# Patient Record
Sex: FEMALE | Race: WHITE | Employment: OTHER | URBAN - METROPOLITAN AREA
[De-identification: names, ages, dates, MRNs, and addresses within clinical notes are randomized per-mention and may not be internally consistent; named-entity substitution may affect disease eponyms.]

---

## 2020-08-31 RX ORDER — METOPROLOL SUCCINATE 25 MG/1
25 TABLET, EXTENDED RELEASE ORAL DAILY
Status: ON HOLD | COMMUNITY
End: 2022-01-01 | Stop reason: HOSPADM

## 2020-08-31 RX ORDER — GABAPENTIN 100 MG/1
100 CAPSULE ORAL 3 TIMES DAILY
Status: ON HOLD | COMMUNITY
End: 2022-01-01

## 2020-09-02 ENCOUNTER — HOSPITAL ENCOUNTER (OUTPATIENT)
Age: 85
Discharge: HOME | End: 2020-09-04
Payer: MEDICARE

## 2020-09-02 PROCEDURE — U0003 INFECTIOUS AGENT DETECTION BY NUCLEIC ACID (DNA OR RNA); SEVERE ACUTE RESPIRATORY SYNDROME CORONAVIRUS 2 (SARS-COV-2) (CORONAVIRUS DISEASE [COVID-19]), AMPLIFIED PROBE TECHNIQUE, MAKING USE OF HIGH THROUGHPUT TECHNOLOGIES AS DESCRIBED BY CMS-2020-01-R: HCPCS

## 2020-09-09 ENCOUNTER — HOSPITAL ENCOUNTER (OUTPATIENT)
Age: 85
Setting detail: OUTPATIENT SURGERY
Discharge: HOME | End: 2020-09-09
Attending: ANESTHESIOLOGY | Admitting: ANESTHESIOLOGY
Payer: MEDICARE

## 2020-09-09 ENCOUNTER — HOSPITAL ENCOUNTER (OUTPATIENT)
Dept: OPERATING ROOM | Age: 85
Setting detail: OUTPATIENT SURGERY
Discharge: HOME | End: 2020-09-09
Attending: ANESTHESIOLOGY
Payer: MEDICARE

## 2020-09-09 VITALS
BODY MASS INDEX: 18.78 KG/M2 | HEIGHT: 63 IN | OXYGEN SATURATION: 98 % | TEMPERATURE: 98.9 F | DIASTOLIC BLOOD PRESSURE: 66 MMHG | RESPIRATION RATE: 16 BRPM | SYSTOLIC BLOOD PRESSURE: 111 MMHG | HEART RATE: 76 BPM | WEIGHT: 106 LBS

## 2020-09-09 LAB
SARS-COV-2: NOT DETECTED
SOURCE: NORMAL

## 2020-09-09 PROCEDURE — 3600000005 HC SURGERY LEVEL 5 BASE: Performed by: ANESTHESIOLOGY

## 2020-09-09 PROCEDURE — 7100000010 HC PHASE II RECOVERY - FIRST 15 MIN: Performed by: ANESTHESIOLOGY

## 2020-09-09 PROCEDURE — 7100000011 HC PHASE II RECOVERY - ADDTL 15 MIN: Performed by: ANESTHESIOLOGY

## 2020-09-09 PROCEDURE — 2709999900 HC NON-CHARGEABLE SUPPLY: Performed by: ANESTHESIOLOGY

## 2020-09-09 PROCEDURE — 2500000003 HC RX 250 WO HCPCS: Performed by: ANESTHESIOLOGY

## 2020-09-09 PROCEDURE — 6360000002 HC RX W HCPCS: Performed by: ANESTHESIOLOGY

## 2020-09-09 PROCEDURE — 3209999900 FLUORO FOR SURGICAL PROCEDURES

## 2020-09-09 RX ORDER — LIDOCAINE HYDROCHLORIDE 10 MG/ML
INJECTION, SOLUTION EPIDURAL; INFILTRATION; INTRACAUDAL; PERINEURAL PRN
Status: DISCONTINUED | OUTPATIENT
Start: 2020-09-09 | End: 2020-09-09 | Stop reason: ALTCHOICE

## 2020-09-09 ASSESSMENT — PAIN SCALES - GENERAL
PAINLEVEL_OUTOF10: 0
PAINLEVEL_OUTOF10: 0

## 2020-09-09 ASSESSMENT — PAIN - FUNCTIONAL ASSESSMENT: PAIN_FUNCTIONAL_ASSESSMENT: 0-10

## 2020-09-09 ASSESSMENT — PAIN DESCRIPTION - DESCRIPTORS: DESCRIPTORS: DISCOMFORT

## 2020-09-09 NOTE — OP NOTE
Sean Deleon    9/9/2020      Preoperative Diagnoses: Degenerative Osteoarthrosis with Facet Syndrome , Lumbar Spondylosis     Postoperative Diagnoses: Same     Procedure Performed: #1 Therapeutic and diagnostic Bilateral Lumbar facet block with medial branch nerve root block under direct fluoroscopic guidance      Anesthesia: Local.     Blood Loss:    None. Brief History:  Loyd Walker comes today for the first  lumbar facet block with medial branch nerve root block. She was explained the details and potential complications of the procedure and requested we proceed. Her complete History & Physical examination was reviewed and it is unchanged. Description of Procedure:     Loyd Walker was taken to the procedure room at The [address] [place] [address]  where, with the assistance of a nurse, she was placed on the fluoroscopy table in the prone position with a roll under the appropriate hip. The lumbar vertebral anatomy was then examined under fluoroscopy. An indelible marker was used to zaheer the facet joints at the appropriate levels. Next the entire lumbar region was prepped and draped in the usual sterile manner. A time-out was performed and confirmed the patients name, date of birth, the procedure to be performed and skin marked for appropriate site and side of procedure. All questions and concerns were answered and the patient requested we proceed. A total of 10 ml. of 1% Xylocaine plain was utilized to obtain local anesthesia of the skin and underlying subcutaneous tissue via a #25-gauge 1.5-inch needle. The right L3-4, L4-5, L5-S1 levels on the appropriate side a #22-gauge 5.0-inch spinal needle was inserted through the skin. It was then positioned under fluoroscopic guidance until it came to lie within the appropriate facet joint.  A solution was then mixed consisting of 8 ml. of 0.25% Bupivacaine and 40 mg Depo-Medrol and 1.5 ml. of this solution was injected into the area of the facet joint and surrounding area. The needle was then repositioned to perform the medial branch nerve block. An additional 1.5 ml. of solution was injected at that level. The needle was then withdrawn intact. The entire procedure was then repeated at the left L3-4, L4-5, L5-S1 levels. Sterile Band-Aids were applied. Loyd Walker was then placed in the supine position and transported to the recovery room and noted to have tolerated the procedure in satisfactory condition. Loyd Walker has been given discharge instructions with their next scheduled appointment.       Electronically signed by Binnie Bosworth

## 2020-09-09 NOTE — H&P
Update History & Physical    The patient's History and Physical of 08/14/2020 was reviewed with the patient and there were no significant changes. I examined the patient and there were no significant changes from the previous History and Physical.    Plan: The risk, benefits, expected outcome, and alternative to the recommended procedure have been discussed with the patient. Patient understands and wants to proceed with the procedure.       Electronically signed by Rhonda Tena DO on 9/9/20 at 11:03 AM EDT

## 2020-09-10 RX ORDER — LANOLIN ALCOHOL/MO/W.PET/CERES
3 CREAM (GRAM) TOPICAL NIGHTLY
COMMUNITY
Start: 2019-03-26 | End: 2022-01-01 | Stop reason: ALTCHOICE

## 2020-09-11 ENCOUNTER — HOSPITAL ENCOUNTER (OUTPATIENT)
Age: 85
Discharge: HOME | End: 2020-09-13
Payer: MEDICARE

## 2020-09-11 PROCEDURE — U0003 INFECTIOUS AGENT DETECTION BY NUCLEIC ACID (DNA OR RNA); SEVERE ACUTE RESPIRATORY SYNDROME CORONAVIRUS 2 (SARS-COV-2) (CORONAVIRUS DISEASE [COVID-19]), AMPLIFIED PROBE TECHNIQUE, MAKING USE OF HIGH THROUGHPUT TECHNOLOGIES AS DESCRIBED BY CMS-2020-01-R: HCPCS

## 2020-09-16 ENCOUNTER — HOSPITAL ENCOUNTER (OUTPATIENT)
Age: 85
Setting detail: OUTPATIENT SURGERY
Discharge: HOME | End: 2020-09-16
Attending: ANESTHESIOLOGY | Admitting: ANESTHESIOLOGY
Payer: MEDICARE

## 2020-09-16 ENCOUNTER — HOSPITAL ENCOUNTER (OUTPATIENT)
Dept: OPERATING ROOM | Age: 85
Setting detail: OUTPATIENT SURGERY
Discharge: HOME | End: 2020-09-16
Attending: ANESTHESIOLOGY
Payer: MEDICARE

## 2020-09-16 VITALS
HEIGHT: 63 IN | SYSTOLIC BLOOD PRESSURE: 160 MMHG | HEART RATE: 72 BPM | OXYGEN SATURATION: 100 % | RESPIRATION RATE: 18 BRPM | WEIGHT: 107 LBS | DIASTOLIC BLOOD PRESSURE: 85 MMHG | BODY MASS INDEX: 18.96 KG/M2 | TEMPERATURE: 98 F

## 2020-09-16 LAB
SARS-COV-2: NOT DETECTED
SOURCE: NORMAL

## 2020-09-16 PROCEDURE — 6360000002 HC RX W HCPCS: Performed by: ANESTHESIOLOGY

## 2020-09-16 PROCEDURE — 7100000010 HC PHASE II RECOVERY - FIRST 15 MIN: Performed by: ANESTHESIOLOGY

## 2020-09-16 PROCEDURE — 7100000011 HC PHASE II RECOVERY - ADDTL 15 MIN: Performed by: ANESTHESIOLOGY

## 2020-09-16 PROCEDURE — 2500000003 HC RX 250 WO HCPCS: Performed by: ANESTHESIOLOGY

## 2020-09-16 PROCEDURE — 3600000005 HC SURGERY LEVEL 5 BASE: Performed by: ANESTHESIOLOGY

## 2020-09-16 PROCEDURE — 3209999900 FLUORO FOR SURGICAL PROCEDURES

## 2020-09-16 PROCEDURE — 2709999900 HC NON-CHARGEABLE SUPPLY: Performed by: ANESTHESIOLOGY

## 2020-09-16 RX ORDER — LIDOCAINE HYDROCHLORIDE 10 MG/ML
INJECTION, SOLUTION EPIDURAL; INFILTRATION; INTRACAUDAL; PERINEURAL PRN
Status: DISCONTINUED | OUTPATIENT
Start: 2020-09-16 | End: 2020-09-16 | Stop reason: ALTCHOICE

## 2020-09-16 ASSESSMENT — PAIN SCALES - GENERAL
PAINLEVEL_OUTOF10: 0
PAINLEVEL_OUTOF10: 0

## 2020-09-16 ASSESSMENT — PAIN - FUNCTIONAL ASSESSMENT: PAIN_FUNCTIONAL_ASSESSMENT: 0-10

## 2020-09-16 ASSESSMENT — PAIN DESCRIPTION - DESCRIPTORS: DESCRIPTORS: DISCOMFORT

## 2020-09-16 NOTE — H&P
Update History & Physical    The patient's History and Physical of 09/04/2020 was reviewed with the patient and there were no significant changes. I examined the patient and there were no significant changes from the previous History and Physical.    Plan: The risk, benefits, expected outcome, and alternative to the recommended procedure have been discussed with the patient. Patient understands and wants to proceed with the procedure.       Electronically signed by Jihan Larsen DO on 9/16/20 at 1:22 PM EDT

## 2020-09-16 NOTE — OP NOTE
Samantha Poster    9/16/2020      Preoperative Diagnoses: Degenerative Osteoarthrosis with Facet Syndrome , Lumbar Spondylosis     Postoperative Diagnoses: Same     Procedure Performed: #2 Therapeutic and diagnostic Bilateral Lumbar facet block with medial branch nerve root block under direct fluoroscopic guidance      Anesthesia: Local.     Blood Loss:    None. Brief History:  Glory Lynn comes today for the second  lumbar facet block with medial branch nerve root block. She was explained the details and potential complications of the procedure and requested we proceed. Glory Lynn states received 85% pain relief following last procedure. Her complete History & Physical examination was reviewed and it is unchanged. Description of Procedure:     Glory Lynn was taken to the procedure room at The [address] [place] [address]  where, with the assistance of a nurse, she was placed on the fluoroscopy table in the prone position with a roll under the appropriate hip. The lumbar vertebral anatomy was then examined under fluoroscopy. An indelible marker was used to zaheer the facet joints at the appropriate levels. Next the entire lumbar region was prepped and draped in the usual sterile manner. A time-out was performed and confirmed the patients name, date of birth, the procedure to be performed and skin marked for appropriate site and side of procedure. All questions and concerns were answered and the patient requested we proceed. A total of 10 ml. of 1% Xylocaine plain was utilized to obtain local anesthesia of the skin and underlying subcutaneous tissue via a #25-gauge 1.5-inch needle. The right L3-4, L4-5, L5-S1 levels on the appropriate side a #22-gauge 5.0-inch spinal needle was inserted through the skin. It was then positioned under fluoroscopic guidance until it came to lie within the appropriate facet joint.  A solution was then mixed consisting of 8 ml. of 0.25% Bupivacaine and 40 mg Depo-Medrol and 1.5 ml. of this solution was injected into the area of the facet joint and surrounding area. The needle was then repositioned to perform the medial branch nerve block. An additional 1.5 ml. of solution was injected at that level. The needle was then withdrawn intact. The entire procedure was then repeated at the left L3-4, L4-5, L5-S1 levels. Sterile Band-Aids were applied. Bridgette Huang was then placed in the supine position and transported to the recovery room and noted to have tolerated the procedure in satisfactory condition. Bridgette Huang has been given discharge instructions with their next scheduled appointment.       Electronically signed by Jacki Altman

## 2020-11-03 ENCOUNTER — HOSPITAL ENCOUNTER (EMERGENCY)
Age: 85
Discharge: HOME | End: 2020-11-03
Attending: EMERGENCY MEDICINE
Payer: MEDICARE

## 2020-11-03 ENCOUNTER — APPOINTMENT (OUTPATIENT)
Dept: ULTRASOUND IMAGING | Age: 85
End: 2020-11-03
Payer: MEDICARE

## 2020-11-03 ENCOUNTER — APPOINTMENT (OUTPATIENT)
Dept: CT IMAGING | Age: 85
End: 2020-11-03
Payer: MEDICARE

## 2020-11-03 VITALS
HEART RATE: 85 BPM | RESPIRATION RATE: 16 BRPM | DIASTOLIC BLOOD PRESSURE: 88 MMHG | WEIGHT: 107 LBS | BODY MASS INDEX: 18.95 KG/M2 | OXYGEN SATURATION: 95 % | SYSTOLIC BLOOD PRESSURE: 130 MMHG | TEMPERATURE: 97.8 F

## 2020-11-03 LAB
ANION GAP SERPL CALCULATED.3IONS-SCNC: 8 MMOL/L (ref 7–16)
BACTERIA: ABNORMAL /HPF
BASOPHILS ABSOLUTE: 0.03 E9/L (ref 0–0.2)
BASOPHILS RELATIVE PERCENT: 0.4 % (ref 0–2)
BILIRUBIN URINE: NEGATIVE
BLOOD, URINE: ABNORMAL
BUN BLDV-MCNC: 25 MG/DL (ref 8–23)
CALCIUM SERPL-MCNC: 9.5 MG/DL (ref 8.6–10.2)
CHLORIDE BLD-SCNC: 103 MMOL/L (ref 98–107)
CLARITY: CLEAR
CO2: 29 MMOL/L (ref 22–29)
COLOR: YELLOW
CREAT SERPL-MCNC: 0.6 MG/DL (ref 0.5–1)
EOSINOPHILS ABSOLUTE: 0.1 E9/L (ref 0.05–0.5)
EOSINOPHILS RELATIVE PERCENT: 1.3 % (ref 0–6)
EPITHELIAL CELLS, UA: ABNORMAL /HPF
GFR AFRICAN AMERICAN: >60
GFR NON-AFRICAN AMERICAN: >60 ML/MIN/1.73
GLUCOSE BLD-MCNC: 120 MG/DL (ref 74–99)
GLUCOSE URINE: NEGATIVE MG/DL
HCT VFR BLD CALC: 38.3 % (ref 34–48)
HEMOGLOBIN: 12.3 G/DL (ref 11.5–15.5)
IMMATURE GRANULOCYTES #: 0.02 E9/L
IMMATURE GRANULOCYTES %: 0.3 % (ref 0–5)
KETONES, URINE: NEGATIVE MG/DL
LEUKOCYTE ESTERASE, URINE: ABNORMAL
LYMPHOCYTES ABSOLUTE: 1.17 E9/L (ref 1.5–4)
LYMPHOCYTES RELATIVE PERCENT: 15.2 % (ref 20–42)
MCH RBC QN AUTO: 29.1 PG (ref 26–35)
MCHC RBC AUTO-ENTMCNC: 32.1 % (ref 32–34.5)
MCV RBC AUTO: 90.5 FL (ref 80–99.9)
MONOCYTES ABSOLUTE: 0.98 E9/L (ref 0.1–0.95)
MONOCYTES RELATIVE PERCENT: 12.7 % (ref 2–12)
NEUTROPHILS ABSOLUTE: 5.41 E9/L (ref 1.8–7.3)
NEUTROPHILS RELATIVE PERCENT: 70.1 % (ref 43–80)
NITRITE, URINE: NEGATIVE
PDW BLD-RTO: 14.6 FL (ref 11.5–15)
PH UA: 6.5 (ref 5–9)
PLATELET # BLD: 196 E9/L (ref 130–450)
PMV BLD AUTO: 10.1 FL (ref 7–12)
POTASSIUM SERPL-SCNC: 3.8 MMOL/L (ref 3.5–5)
PROTEIN UA: NEGATIVE MG/DL
RBC # BLD: 4.23 E12/L (ref 3.5–5.5)
RBC UA: ABNORMAL /HPF (ref 0–2)
SODIUM BLD-SCNC: 140 MMOL/L (ref 132–146)
SPECIFIC GRAVITY UA: 1.02 (ref 1–1.03)
UROBILINOGEN, URINE: 0.2 E.U./DL
WBC # BLD: 7.7 E9/L (ref 4.5–11.5)
WBC UA: ABNORMAL /HPF (ref 0–5)

## 2020-11-03 PROCEDURE — 70450 CT HEAD/BRAIN W/O DYE: CPT

## 2020-11-03 PROCEDURE — 93970 EXTREMITY STUDY: CPT

## 2020-11-03 PROCEDURE — 81001 URINALYSIS AUTO W/SCOPE: CPT

## 2020-11-03 PROCEDURE — 6370000000 HC RX 637 (ALT 250 FOR IP): Performed by: EMERGENCY MEDICINE

## 2020-11-03 PROCEDURE — 80048 BASIC METABOLIC PNL TOTAL CA: CPT

## 2020-11-03 PROCEDURE — 99283 EMERGENCY DEPT VISIT LOW MDM: CPT

## 2020-11-03 PROCEDURE — 85025 COMPLETE CBC W/AUTO DIFF WBC: CPT

## 2020-11-03 PROCEDURE — 93005 ELECTROCARDIOGRAM TRACING: CPT | Performed by: EMERGENCY MEDICINE

## 2020-11-03 RX ORDER — CEPHALEXIN 250 MG/1
500 CAPSULE ORAL ONCE
Status: COMPLETED | OUTPATIENT
Start: 2020-11-03 | End: 2020-11-03

## 2020-11-03 RX ORDER — ONDANSETRON 4 MG/1
4 TABLET, ORALLY DISINTEGRATING ORAL EVERY 8 HOURS PRN
Qty: 8 TABLET | Refills: 0 | Status: ON HOLD | OUTPATIENT
Start: 2020-11-03 | End: 2022-01-01

## 2020-11-03 RX ORDER — CEPHALEXIN 500 MG/1
500 CAPSULE ORAL 4 TIMES DAILY
Qty: 40 CAPSULE | Refills: 0 | Status: ON HOLD | OUTPATIENT
Start: 2020-11-03 | End: 2022-01-01

## 2020-11-03 RX ADMIN — CEPHALEXIN 500 MG: 250 CAPSULE ORAL at 16:57

## 2020-11-03 ASSESSMENT — ENCOUNTER SYMPTOMS
VOMITING: 0
SHORTNESS OF BREATH: 0
ABDOMINAL PAIN: 0
SORE THROAT: 0
NAUSEA: 0
COUGH: 0

## 2020-11-03 ASSESSMENT — PAIN DESCRIPTION - ORIENTATION: ORIENTATION: LEFT

## 2020-11-03 ASSESSMENT — PAIN DESCRIPTION - LOCATION: LOCATION: GROIN

## 2020-11-03 ASSESSMENT — PAIN SCALES - GENERAL: PAINLEVEL_OUTOF10: 9

## 2020-11-03 NOTE — ED PROVIDER NOTES
Chief complaint leg swelling and numbness  History of present illness 80-year-old female sent in by her physician today for evaluation of her leg and numbness. She has had swelling and slight discoloration of the left lower leg as well as some discomfort in her left thigh for the past several days since she tripped and fell after twisting her right leg. She also has some swelling in the right lower leg which she states is probably secondary to the fall to but she has no pain on that side. She has been able to walk and bear weight, denies any numbness or tingling anywhere other than the left leg and the numbness there is between the proximal aspect of the tib-fib region and to the ankle. No numbness or weakness in the foot or toes. She denies any headache or head injury, denies any neck pain, denies visual changes, denies chest pain difficulty breathing abdominal pain or any other acute complaints at this time. Review of Systems   Constitutional: Negative for chills and fever. HENT: Negative for congestion and sore throat. Respiratory: Negative for cough and shortness of breath. Cardiovascular: Negative for chest pain. Gastrointestinal: Negative for abdominal pain, nausea and vomiting. Musculoskeletal: Negative for neck pain. Neurological: Positive for numbness. Negative for dizziness, weakness and light-headedness. All other systems reviewed and are negative. Physical Exam    General: awake and alert, oriented, in no distress. Skin: warm, pink, and dry. Head: normocephalic and atraumatic  EENT: TMs are clear and gray, external auditory canals are normal. Pupils are equal, round, and reactive to light . Conjunctiva are clear. There is no epistaxis or rhinorrhea. Oral mucous membranes are moist, pharynx is clear with no erythema or exudates. Airway is intact with no stridor, no drooling, no difficulty controlling secretions.  Phonation is normal.  Neck: supple and nontender with good range of motion. There are no meningeal signs, there is no cervical adenopathy  Heart: regular rate and rhythm, no murmur. Lungs: clear to auscultation bilaterally, no wheezes or crackles. Breathing unlabored. Abdomen: soft and nontender, nondistended with no rebound, rigidity, or guarding  Extremities: She has mild tenderness over the proximal thigh without swelling there. There is mild edema of the left and right pretibial areas with pitting. The left pretibial region is also slightly erythematous and shiny. No particular tenderness on palpation, no crepitus. She notes numbness down this region of the lower leg but has no numbness to touch, has full sensation distal to that, including the ankle foot and toes. She has good cap refill in the toes, strong DP pulses bilaterally. She has mild discomfort when straight leg raising on the left no pain with straight leg raise in the right, no pain with rotation of the left leg and there is no shortening of this leg. Neuro: Cranial nerves II through XII are intact, she has no peripheral motor episodes in the 4 extremities. She does have a sensory deficit in the left lower leg as noted above, no other sensory deficits noted on exam.      ----------------------------------------------- PAST HISTORY --------------------------------------------  Past Medical History:  has a past medical history of Arthritis, Osteoporosis, and Paget's disease. Past Surgical History:  has a past surgical history that includes Colon surgery; knee surgery; Lung biopsy; Total hip arthroplasty; joint replacement; Nerve Block (11-); Nerve Block (11 16 2011); Nerve Block (11 30 2011); Nerve Block (12 28 2012); Nerve Block; Nerve Block (1 21 13); Nerve Block (Right, 06 12 2013); Nerve Block (Right, 6 24 13); Nerve Block (Right, 07 24 2013); other surgical history (Right, 09 20 2013); Nerve Block (Right, 1 15 14); Nerve Block (Right, 2/19/2014); Nerve Block (03/05/14);  Nerve Block (Bilateral, 10/29/2014); Nerve Block (Bilateral, 11/5/2014); Nerve Block (Bilateral, 11/12/14); Nerve Block (Right, 05/22/2017); Nerve Block (Right, 06/05/2017); Nerve Block (Bilateral, 06/12/2017); Nerve Block (Bilateral, 06/21/2017); Nerve Block (Bilateral, 09/09/2020); Nerve Block (Bilateral, 9/9/2020); Nerve Block (Bilateral, 09/16/2020); and Nerve Block (Bilateral, 9/16/2020). Social History:  reports that she has never smoked. She does not have any smokeless tobacco history on file. She reports that she does not drink alcohol. Family History: family history is not on file. The patients home medications have been reviewed.     Allergies: Biaxin [clarithromycin] and Floxin [ofloxacin]    ------------------------------------------------ RESULTS ---------------------------------------------------    Labs:  Results for orders placed or performed during the hospital encounter of 11/03/20   CBC auto differential   Result Value Ref Range    WBC 7.7 4.5 - 11.5 E9/L    RBC 4.23 3.50 - 5.50 E12/L    Hemoglobin 12.3 11.5 - 15.5 g/dL    Hematocrit 38.3 34.0 - 48.0 %    MCV 90.5 80.0 - 99.9 fL    MCH 29.1 26.0 - 35.0 pg    MCHC 32.1 32.0 - 34.5 %    RDW 14.6 11.5 - 15.0 fL    Platelets 927 690 - 654 E9/L    MPV 10.1 7.0 - 12.0 fL    Neutrophils % 70.1 43.0 - 80.0 %    Immature Granulocytes % 0.3 0.0 - 5.0 %    Lymphocytes % 15.2 (L) 20.0 - 42.0 %    Monocytes % 12.7 (H) 2.0 - 12.0 %    Eosinophils % 1.3 0.0 - 6.0 %    Basophils % 0.4 0.0 - 2.0 %    Neutrophils Absolute 5.41 1.80 - 7.30 E9/L    Immature Granulocytes # 0.02 E9/L    Lymphocytes Absolute 1.17 (L) 1.50 - 4.00 E9/L    Monocytes Absolute 0.98 (H) 0.10 - 0.95 E9/L    Eosinophils Absolute 0.10 0.05 - 0.50 E9/L    Basophils Absolute 0.03 0.00 - 0.20 N7/Q   Basic metabolic panel   Result Value Ref Range    Sodium 140 132 - 146 mmol/L    Potassium 3.8 3.5 - 5.0 mmol/L    Chloride 103 98 - 107 mmol/L    CO2 29 22 - 29 mmol/L    Anion Gap 8 7 - 16 mmol/L Glucose 120 (H) 74 - 99 mg/dL    BUN 25 (H) 8 - 23 mg/dL    CREATININE 0.6 0.5 - 1.0 mg/dL    GFR Non-African American >60 >=60 mL/min/1.73    GFR African American >60     Calcium 9.5 8.6 - 10.2 mg/dL   Urinalysis   Result Value Ref Range    Color, UA Yellow Straw/Yellow    Clarity, UA Clear Clear    Glucose, Ur Negative Negative mg/dL    Bilirubin Urine Negative Negative    Ketones, Urine Negative Negative mg/dL    Specific Gravity, UA 1.025 1.005 - 1.030    Blood, Urine TRACE (A) Negative    pH, UA 6.5 5.0 - 9.0    Protein, UA Negative Negative mg/dL    Urobilinogen, Urine 0.2 <2.0 E.U./dL    Nitrite, Urine Negative Negative    Leukocyte Esterase, Urine TRACE (A) Negative   EKG 12 Lead   Result Value Ref Range    Ventricular Rate 81 BPM    Atrial Rate 81 BPM    P-R Interval 132 ms    QRS Duration 82 ms    Q-T Interval 376 ms    QTc Calculation (Bazett) 436 ms    P Axis 60 degrees    R Axis 40 degrees    T Axis 55 degrees     Imaging: All Radiology results interpreted by Radiologist unless otherwise noted. US DUP LOWER EXTREMITIES BILATERAL VENOUS   Final Result   No evidence of DVT in either lower extremity. CT HEAD WO CONTRAST   Final Result   No acute intracranial abnormality. Chronic small vessel ischemic disease             EKG:  This EKG is signed and interpreted by ED Physician. Time:  1402   Rate: 81  Rhythm: Sinus. Interpretation: no acute changes. Comparison: no previous EKG.    ---------------------------- NURSING NOTES AND VITALS REVIEWED -------------------------   The nursing notes within the ED encounter and vital signs as below have been reviewed.    /88   Pulse 85   Temp 97.8 °F (36.6 °C)   Resp 16   Wt 107 lb (48.5 kg)   SpO2 95%   BMI 18.95 kg/m²   Oxygen Saturation Interpretation: Normal      ------------------------------------------PROGRESS NOTES -------------------------------------------    ED COURSE MEDICATIONS:                Medications   cephALEXin (KEFLEX)

## 2020-11-04 LAB
EKG ATRIAL RATE: 81 BPM
EKG P AXIS: 60 DEGREES
EKG P-R INTERVAL: 132 MS
EKG Q-T INTERVAL: 376 MS
EKG QRS DURATION: 82 MS
EKG QTC CALCULATION (BAZETT): 436 MS
EKG R AXIS: 40 DEGREES
EKG T AXIS: 55 DEGREES
EKG VENTRICULAR RATE: 81 BPM

## 2022-01-01 ENCOUNTER — APPOINTMENT (OUTPATIENT)
Dept: GENERAL RADIOLOGY | Age: 87
DRG: 956 | End: 2022-01-01
Payer: MEDICARE

## 2022-01-01 ENCOUNTER — APPOINTMENT (OUTPATIENT)
Dept: CT IMAGING | Age: 87
DRG: 956 | End: 2022-01-01
Payer: MEDICARE

## 2022-01-01 ENCOUNTER — ANESTHESIA EVENT (OUTPATIENT)
Dept: ENDOSCOPY | Age: 87
DRG: 956 | End: 2022-01-01
Payer: MEDICARE

## 2022-01-01 ENCOUNTER — ANESTHESIA (OUTPATIENT)
Dept: OPERATING ROOM | Age: 87
DRG: 956 | End: 2022-01-01
Payer: MEDICARE

## 2022-01-01 ENCOUNTER — ANESTHESIA (OUTPATIENT)
Dept: ENDOSCOPY | Age: 87
DRG: 956 | End: 2022-01-01
Payer: MEDICARE

## 2022-01-01 ENCOUNTER — HOSPITAL ENCOUNTER (INPATIENT)
Age: 87
LOS: 17 days | DRG: 956 | End: 2022-07-30
Attending: EMERGENCY MEDICINE | Admitting: INTERNAL MEDICINE
Payer: MEDICARE

## 2022-01-01 ENCOUNTER — ANESTHESIA EVENT (OUTPATIENT)
Dept: OPERATING ROOM | Age: 87
DRG: 956 | End: 2022-01-01
Payer: MEDICARE

## 2022-01-01 ENCOUNTER — APPOINTMENT (OUTPATIENT)
Dept: GENERAL RADIOLOGY | Age: 87
End: 2022-01-01
Payer: MEDICARE

## 2022-01-01 ENCOUNTER — HOSPITAL ENCOUNTER (EMERGENCY)
Age: 87
Discharge: HOME | End: 2022-01-28
Payer: MEDICARE

## 2022-01-01 VITALS
SYSTOLIC BLOOD PRESSURE: 156 MMHG | RESPIRATION RATE: 20 BRPM | HEIGHT: 63 IN | TEMPERATURE: 97.3 F | DIASTOLIC BLOOD PRESSURE: 82 MMHG | OXYGEN SATURATION: 99 % | WEIGHT: 93 LBS | BODY MASS INDEX: 16.48 KG/M2 | HEART RATE: 87 BPM

## 2022-01-01 VITALS
BODY MASS INDEX: 21.16 KG/M2 | WEIGHT: 115 LBS | RESPIRATION RATE: 49 BRPM | HEIGHT: 62 IN | HEART RATE: 122 BPM | SYSTOLIC BLOOD PRESSURE: 98 MMHG | TEMPERATURE: 97.3 F | OXYGEN SATURATION: 56 % | DIASTOLIC BLOOD PRESSURE: 45 MMHG
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
## 2022-01-01 DIAGNOSIS — T79.6XXA TRAUMATIC RHABDOMYOLYSIS, INITIAL ENCOUNTER (HCC): ICD-10-CM

## 2022-01-01 DIAGNOSIS — S72.002A CLOSED FRACTURE OF LEFT HIP, INITIAL ENCOUNTER (HCC): ICD-10-CM

## 2022-01-01 DIAGNOSIS — S72.001A CLOSED FRACTURE OF RIGHT HIP, INITIAL ENCOUNTER (HCC): Primary | ICD-10-CM

## 2022-01-01 DIAGNOSIS — M79.641 RIGHT HAND PAIN: Primary | ICD-10-CM

## 2022-01-01 DIAGNOSIS — J96.00 ACUTE RESPIRATORY FAILURE, UNSPECIFIED WHETHER WITH HYPOXIA OR HYPERCAPNIA (HCC): ICD-10-CM

## 2022-01-01 DIAGNOSIS — M25.531 RIGHT WRIST PAIN: ICD-10-CM

## 2022-01-01 LAB
AADO2: 247 MMHG
AADO2: 421.8 MMHG
AADO2: 525.1 MMHG
AADO2: 585.2 MMHG
AADO2: 613.3 MMHG
ABO/RH: NORMAL
ACANTHOCYTES: ABNORMAL
ACANTHOCYTES: ABNORMAL
ALBUMIN SERPL-MCNC: 2.2 G/DL (ref 3.5–5.2)
ALBUMIN SERPL-MCNC: 2.3 G/DL (ref 3.5–5.2)
ALBUMIN SERPL-MCNC: 2.6 G/DL (ref 3.5–5.2)
ALBUMIN SERPL-MCNC: 2.6 G/DL (ref 3.5–5.2)
ALBUMIN SERPL-MCNC: 2.7 G/DL (ref 3.5–5.2)
ALBUMIN SERPL-MCNC: 2.8 G/DL (ref 3.5–5.2)
ALBUMIN SERPL-MCNC: 2.8 G/DL (ref 3.5–5.2)
ALBUMIN SERPL-MCNC: 2.9 G/DL (ref 3.5–5.2)
ALBUMIN SERPL-MCNC: 2.9 G/DL (ref 3.5–5.2)
ALBUMIN SERPL-MCNC: 3 G/DL (ref 3.5–5.2)
ALBUMIN SERPL-MCNC: 3 G/DL (ref 3.5–5.2)
ALBUMIN SERPL-MCNC: 3.4 G/DL (ref 3.5–5.2)
ALBUMIN SERPL-MCNC: 3.7 G/DL (ref 3.5–5.2)
ALP BLD-CCNC: 108 U/L (ref 35–104)
ALP BLD-CCNC: 121 U/L (ref 35–104)
ALP BLD-CCNC: 137 U/L (ref 35–104)
ALP BLD-CCNC: 57 U/L (ref 35–104)
ALP BLD-CCNC: 59 U/L (ref 35–104)
ALP BLD-CCNC: 60 U/L (ref 35–104)
ALP BLD-CCNC: 60 U/L (ref 35–104)
ALP BLD-CCNC: 61 U/L (ref 35–104)
ALP BLD-CCNC: 64 U/L (ref 35–104)
ALP BLD-CCNC: 80 U/L (ref 35–104)
ALP BLD-CCNC: 81 U/L (ref 35–104)
ALP BLD-CCNC: 90 U/L (ref 35–104)
ALP BLD-CCNC: 95 U/L (ref 35–104)
ALT SERPL-CCNC: 12 U/L (ref 0–32)
ALT SERPL-CCNC: 13 U/L (ref 0–32)
ALT SERPL-CCNC: 14 U/L (ref 0–32)
ALT SERPL-CCNC: 15 U/L (ref 0–32)
ALT SERPL-CCNC: 15 U/L (ref 0–32)
ALT SERPL-CCNC: 16 U/L (ref 0–32)
ALT SERPL-CCNC: 16 U/L (ref 0–32)
ALT SERPL-CCNC: 17 U/L (ref 0–32)
ALT SERPL-CCNC: 17 U/L (ref 0–32)
ALT SERPL-CCNC: 18 U/L (ref 0–32)
ALT SERPL-CCNC: 20 U/L (ref 0–32)
ALT SERPL-CCNC: 31 U/L (ref 0–32)
ALT SERPL-CCNC: 32 U/L (ref 0–32)
ALT SERPL-CCNC: 49 U/L (ref 0–32)
ANION GAP SERPL CALCULATED.3IONS-SCNC: 10 MMOL/L (ref 7–16)
ANION GAP SERPL CALCULATED.3IONS-SCNC: 11 MMOL/L (ref 7–16)
ANION GAP SERPL CALCULATED.3IONS-SCNC: 5 MMOL/L (ref 7–16)
ANION GAP SERPL CALCULATED.3IONS-SCNC: 6 MMOL/L (ref 7–16)
ANION GAP SERPL CALCULATED.3IONS-SCNC: 7 MMOL/L (ref 7–16)
ANION GAP SERPL CALCULATED.3IONS-SCNC: 8 MMOL/L (ref 7–16)
ANION GAP SERPL CALCULATED.3IONS-SCNC: 9 MMOL/L (ref 7–16)
ANISOCYTOSIS: ABNORMAL
ANTIBODY SCREEN: NORMAL
APTT: 24.1 SEC (ref 24.5–35.1)
AST SERPL-CCNC: 20 U/L (ref 0–31)
AST SERPL-CCNC: 24 U/L (ref 0–31)
AST SERPL-CCNC: 24 U/L (ref 0–31)
AST SERPL-CCNC: 26 U/L (ref 0–31)
AST SERPL-CCNC: 26 U/L (ref 0–31)
AST SERPL-CCNC: 28 U/L (ref 0–31)
AST SERPL-CCNC: 28 U/L (ref 0–31)
AST SERPL-CCNC: 29 U/L (ref 0–31)
AST SERPL-CCNC: 30 U/L (ref 0–31)
AST SERPL-CCNC: 32 U/L (ref 0–31)
AST SERPL-CCNC: 37 U/L (ref 0–31)
AST SERPL-CCNC: 38 U/L (ref 0–31)
AST SERPL-CCNC: 41 U/L (ref 0–31)
AST SERPL-CCNC: 45 U/L (ref 0–31)
AST SERPL-CCNC: 52 U/L (ref 0–31)
AST SERPL-CCNC: 53 U/L (ref 0–31)
B.E.: 0 MMOL/L (ref -3–3)
B.E.: 1.7 MMOL/L (ref -3–3)
B.E.: 2.6 MMOL/L (ref -3–3)
B.E.: 3.3 MMOL/L (ref -3–3)
B.E.: 5.2 MMOL/L (ref -3–3)
B.E.: 7.2 MMOL/L (ref -3–3)
BACTERIA: ABNORMAL /HPF
BASOPHILIC STIPPLING: ABNORMAL
BASOPHILS ABSOLUTE: 0 E9/L (ref 0–0.2)
BASOPHILS ABSOLUTE: 0.01 E9/L (ref 0–0.2)
BASOPHILS ABSOLUTE: 0.01 E9/L (ref 0–0.2)
BASOPHILS ABSOLUTE: 0.03 E9/L (ref 0–0.2)
BASOPHILS RELATIVE PERCENT: 0 % (ref 0–2)
BASOPHILS RELATIVE PERCENT: 0 % (ref 0–2)
BASOPHILS RELATIVE PERCENT: 0.1 % (ref 0–2)
BASOPHILS RELATIVE PERCENT: 0.2 % (ref 0–2)
BILIRUB SERPL-MCNC: 0.5 MG/DL (ref 0–1.2)
BILIRUB SERPL-MCNC: 0.5 MG/DL (ref 0–1.2)
BILIRUB SERPL-MCNC: 0.6 MG/DL (ref 0–1.2)
BILIRUB SERPL-MCNC: 0.7 MG/DL (ref 0–1.2)
BILIRUB SERPL-MCNC: 0.8 MG/DL (ref 0–1.2)
BILIRUB SERPL-MCNC: 1.1 MG/DL (ref 0–1.2)
BILIRUB SERPL-MCNC: 1.1 MG/DL (ref 0–1.2)
BILIRUBIN URINE: ABNORMAL
BILIRUBIN URINE: NEGATIVE
BLOOD CULTURE, ROUTINE: NORMAL
BLOOD CULTURE, ROUTINE: NORMAL
BLOOD, URINE: ABNORMAL
BLOOD, URINE: NEGATIVE
BUN BLDV-MCNC: 21 MG/DL (ref 6–23)
BUN BLDV-MCNC: 30 MG/DL (ref 6–23)
BUN BLDV-MCNC: 30 MG/DL (ref 6–23)
BUN BLDV-MCNC: 35 MG/DL (ref 6–23)
BUN BLDV-MCNC: 37 MG/DL (ref 6–23)
BUN BLDV-MCNC: 47 MG/DL (ref 6–23)
BUN BLDV-MCNC: 47 MG/DL (ref 6–23)
BUN BLDV-MCNC: 48 MG/DL (ref 6–23)
BUN BLDV-MCNC: 49 MG/DL (ref 6–23)
BUN BLDV-MCNC: 53 MG/DL (ref 6–23)
BUN BLDV-MCNC: 54 MG/DL (ref 6–23)
BUN BLDV-MCNC: 57 MG/DL (ref 6–23)
BUN BLDV-MCNC: 64 MG/DL (ref 6–23)
BUN BLDV-MCNC: 68 MG/DL (ref 6–23)
BUN BLDV-MCNC: 70 MG/DL (ref 6–23)
BUN BLDV-MCNC: 72 MG/DL (ref 6–23)
BUN BLDV-MCNC: 74 MG/DL (ref 6–23)
BUN BLDV-MCNC: 74 MG/DL (ref 6–23)
BUN BLDV-MCNC: 77 MG/DL (ref 6–23)
BURR CELLS: ABNORMAL
C-REACTIVE PROTEIN: 12.6 MG/DL (ref 0–0.4)
C-REACTIVE PROTEIN: 16.1 MG/DL (ref 0–0.4)
CALCIUM SERPL-MCNC: 8.1 MG/DL (ref 8.6–10.2)
CALCIUM SERPL-MCNC: 8.3 MG/DL (ref 8.6–10.2)
CALCIUM SERPL-MCNC: 8.4 MG/DL (ref 8.6–10.2)
CALCIUM SERPL-MCNC: 8.5 MG/DL (ref 8.6–10.2)
CALCIUM SERPL-MCNC: 8.6 MG/DL (ref 8.6–10.2)
CALCIUM SERPL-MCNC: 8.8 MG/DL (ref 8.6–10.2)
CALCIUM SERPL-MCNC: 8.9 MG/DL (ref 8.6–10.2)
CALCIUM SERPL-MCNC: 9 MG/DL (ref 8.6–10.2)
CALCIUM SERPL-MCNC: 9.1 MG/DL (ref 8.6–10.2)
CALCIUM SERPL-MCNC: 9.1 MG/DL (ref 8.6–10.2)
CALCIUM SERPL-MCNC: 9.2 MG/DL (ref 8.6–10.2)
CALCIUM SERPL-MCNC: 9.7 MG/DL (ref 8.6–10.2)
CALCIUM SERPL-MCNC: 9.7 MG/DL (ref 8.6–10.2)
CHLORIDE BLD-SCNC: 101 MMOL/L (ref 98–107)
CHLORIDE BLD-SCNC: 102 MMOL/L (ref 98–107)
CHLORIDE BLD-SCNC: 103 MMOL/L (ref 98–107)
CHLORIDE BLD-SCNC: 103 MMOL/L (ref 98–107)
CHLORIDE BLD-SCNC: 104 MMOL/L (ref 98–107)
CHLORIDE BLD-SCNC: 106 MMOL/L (ref 98–107)
CHLORIDE BLD-SCNC: 107 MMOL/L (ref 98–107)
CHLORIDE BLD-SCNC: 111 MMOL/L (ref 98–107)
CHLORIDE BLD-SCNC: 112 MMOL/L (ref 98–107)
CHLORIDE BLD-SCNC: 113 MMOL/L (ref 98–107)
CHLORIDE BLD-SCNC: 116 MMOL/L (ref 98–107)
CHLORIDE BLD-SCNC: 98 MMOL/L (ref 98–107)
CHLORIDE BLD-SCNC: 99 MMOL/L (ref 98–107)
CLARITY: ABNORMAL
CLARITY: CLEAR
CO2: 26 MMOL/L (ref 22–29)
CO2: 26 MMOL/L (ref 22–29)
CO2: 27 MMOL/L (ref 22–29)
CO2: 28 MMOL/L (ref 22–29)
CO2: 29 MMOL/L (ref 22–29)
CO2: 29 MMOL/L (ref 22–29)
CO2: 30 MMOL/L (ref 22–29)
CO2: 31 MMOL/L (ref 22–29)
CO2: 31 MMOL/L (ref 22–29)
CO2: 34 MMOL/L (ref 22–29)
COHB: 0.2 % (ref 0–1.5)
COHB: 0.2 % (ref 0–1.5)
COHB: 0.3 % (ref 0–1.5)
COHB: 0.4 % (ref 0–1.5)
COLOR: YELLOW
COLOR: YELLOW
COMMENT: ABNORMAL
CREAT SERPL-MCNC: 0.5 MG/DL (ref 0.5–1)
CREAT SERPL-MCNC: 0.5 MG/DL (ref 0.5–1)
CREAT SERPL-MCNC: 0.6 MG/DL (ref 0.5–1)
CREAT SERPL-MCNC: 0.6 MG/DL (ref 0.5–1)
CREAT SERPL-MCNC: 0.7 MG/DL (ref 0.5–1)
CREAT SERPL-MCNC: 0.9 MG/DL (ref 0.5–1)
CREAT SERPL-MCNC: 1 MG/DL (ref 0.5–1)
CREAT SERPL-MCNC: 1.1 MG/DL (ref 0.5–1)
CRITICAL: ABNORMAL
CULTURE, BLOOD 2: NORMAL
CULTURE, BLOOD 2: NORMAL
CULTURE, RESPIRATORY: ABNORMAL
CULTURE, RESPIRATORY: ABNORMAL
DATE ANALYZED: ABNORMAL
DATE OF COLLECTION: ABNORMAL
EKG ATRIAL RATE: 103 BPM
EKG ATRIAL RATE: 104 BPM
EKG ATRIAL RATE: 109 BPM
EKG ATRIAL RATE: 131 BPM
EKG ATRIAL RATE: 227 BPM
EKG P AXIS: 55 DEGREES
EKG P AXIS: 75 DEGREES
EKG P AXIS: 86 DEGREES
EKG P-R INTERVAL: 114 MS
EKG P-R INTERVAL: 122 MS
EKG P-R INTERVAL: 130 MS
EKG Q-T INTERVAL: 298 MS
EKG Q-T INTERVAL: 300 MS
EKG Q-T INTERVAL: 304 MS
EKG Q-T INTERVAL: 330 MS
EKG Q-T INTERVAL: 352 MS
EKG QRS DURATION: 100 MS
EKG QRS DURATION: 60 MS
EKG QRS DURATION: 60 MS
EKG QRS DURATION: 66 MS
EKG QRS DURATION: 76 MS
EKG QTC CALCULATION (BAZETT): 390 MS
EKG QTC CALCULATION (BAZETT): 394 MS
EKG QTC CALCULATION (BAZETT): 409 MS
EKG QTC CALCULATION (BAZETT): 438 MS
EKG QTC CALCULATION (BAZETT): 447 MS
EKG R AXIS: 10 DEGREES
EKG R AXIS: 50 DEGREES
EKG R AXIS: 56 DEGREES
EKG R AXIS: 56 DEGREES
EKG R AXIS: 75 DEGREES
EKG T AXIS: -168 DEGREES
EKG T AXIS: 71 DEGREES
EKG T AXIS: 83 DEGREES
EKG T AXIS: 85 DEGREES
EKG T AXIS: 92 DEGREES
EKG VENTRICULAR RATE: 103 BPM
EKG VENTRICULAR RATE: 104 BPM
EKG VENTRICULAR RATE: 106 BPM
EKG VENTRICULAR RATE: 109 BPM
EKG VENTRICULAR RATE: 97 BPM
EOSINOPHILS ABSOLUTE: 0 E9/L (ref 0.05–0.5)
EOSINOPHILS ABSOLUTE: 0.07 E9/L (ref 0.05–0.5)
EOSINOPHILS RELATIVE PERCENT: 0 % (ref 0–6)
EOSINOPHILS RELATIVE PERCENT: 0.2 % (ref 0–6)
EOSINOPHILS RELATIVE PERCENT: 0.3 % (ref 0–6)
EOSINOPHILS RELATIVE PERCENT: 0.7 % (ref 0–6)
EOSINOPHILS RELATIVE PERCENT: 2 % (ref 0–6)
EPITHELIAL CELLS, UA: ABNORMAL /HPF
EPITHELIAL CELLS, UA: ABNORMAL /HPF
FERRITIN: 411 NG/ML
FIO2: 100 %
FIO2: 50 %
FIO2: 80 %
FOLATE: 10.1 NG/ML (ref 4.8–24.2)
GFR AFRICAN AMERICAN: 56
GFR AFRICAN AMERICAN: >60
GFR NON-AFRICAN AMERICAN: 46 ML/MIN/1.73
GFR NON-AFRICAN AMERICAN: 52 ML/MIN/1.73
GFR NON-AFRICAN AMERICAN: 58 ML/MIN/1.73
GFR NON-AFRICAN AMERICAN: >60 ML/MIN/1.73
GLUCOSE BLD-MCNC: 104 MG/DL (ref 74–99)
GLUCOSE BLD-MCNC: 107 MG/DL (ref 74–99)
GLUCOSE BLD-MCNC: 117 MG/DL (ref 74–99)
GLUCOSE BLD-MCNC: 127 MG/DL (ref 74–99)
GLUCOSE BLD-MCNC: 128 MG/DL (ref 74–99)
GLUCOSE BLD-MCNC: 131 MG/DL (ref 74–99)
GLUCOSE BLD-MCNC: 132 MG/DL (ref 74–99)
GLUCOSE BLD-MCNC: 135 MG/DL (ref 74–99)
GLUCOSE BLD-MCNC: 138 MG/DL (ref 74–99)
GLUCOSE BLD-MCNC: 138 MG/DL (ref 74–99)
GLUCOSE BLD-MCNC: 141 MG/DL (ref 74–99)
GLUCOSE BLD-MCNC: 145 MG/DL (ref 74–99)
GLUCOSE BLD-MCNC: 145 MG/DL (ref 74–99)
GLUCOSE BLD-MCNC: 153 MG/DL (ref 74–99)
GLUCOSE BLD-MCNC: 154 MG/DL (ref 74–99)
GLUCOSE BLD-MCNC: 177 MG/DL (ref 74–99)
GLUCOSE BLD-MCNC: 185 MG/DL (ref 74–99)
GLUCOSE BLD-MCNC: 231 MG/DL (ref 74–99)
GLUCOSE BLD-MCNC: 232 MG/DL (ref 74–99)
GLUCOSE URINE: NEGATIVE MG/DL
GLUCOSE URINE: NEGATIVE MG/DL
GRAM STAIN ORDERABLE: NORMAL
HCO3: 23.9 MMOL/L (ref 22–26)
HCO3: 25.7 MMOL/L (ref 22–26)
HCO3: 27.3 MMOL/L (ref 22–26)
HCO3: 27.8 MMOL/L (ref 22–26)
HCO3: 30.1 MMOL/L (ref 22–26)
HCO3: 34.2 MMOL/L (ref 22–26)
HCT VFR BLD CALC: 23.4 % (ref 34–48)
HCT VFR BLD CALC: 24.1 % (ref 34–48)
HCT VFR BLD CALC: 26.3 % (ref 34–48)
HCT VFR BLD CALC: 26.5 % (ref 34–48)
HCT VFR BLD CALC: 26.6 % (ref 34–48)
HCT VFR BLD CALC: 26.8 % (ref 34–48)
HCT VFR BLD CALC: 26.8 % (ref 34–48)
HCT VFR BLD CALC: 27.2 % (ref 34–48)
HCT VFR BLD CALC: 27.4 % (ref 34–48)
HCT VFR BLD CALC: 27.6 % (ref 34–48)
HCT VFR BLD CALC: 28.7 % (ref 34–48)
HCT VFR BLD CALC: 28.7 % (ref 34–48)
HCT VFR BLD CALC: 29 % (ref 34–48)
HCT VFR BLD CALC: 32.3 % (ref 34–48)
HCT VFR BLD CALC: 33.5 % (ref 34–48)
HEMOGLOBIN: 10 G/DL (ref 11.5–15.5)
HEMOGLOBIN: 10.7 G/DL (ref 11.5–15.5)
HEMOGLOBIN: 7.1 G/DL (ref 11.5–15.5)
HEMOGLOBIN: 7.3 G/DL (ref 11.5–15.5)
HEMOGLOBIN: 7.3 G/DL (ref 11.5–15.5)
HEMOGLOBIN: 7.4 G/DL (ref 11.5–15.5)
HEMOGLOBIN: 7.4 G/DL (ref 11.5–15.5)
HEMOGLOBIN: 7.5 G/DL (ref 11.5–15.5)
HEMOGLOBIN: 7.5 G/DL (ref 11.5–15.5)
HEMOGLOBIN: 7.7 G/DL (ref 11.5–15.5)
HEMOGLOBIN: 7.8 G/DL (ref 11.5–15.5)
HEMOGLOBIN: 7.9 G/DL (ref 11.5–15.5)
HEMOGLOBIN: 7.9 G/DL (ref 11.5–15.5)
HEMOGLOBIN: 8 G/DL (ref 11.5–15.5)
HEMOGLOBIN: 8.1 G/DL (ref 11.5–15.5)
HEMOGLOBIN: 8.3 G/DL (ref 11.5–15.5)
HEMOGLOBIN: 8.4 G/DL (ref 11.5–15.5)
HEMOGLOBIN: 8.4 G/DL (ref 11.5–15.5)
HEMOGLOBIN: 8.5 G/DL (ref 11.5–15.5)
HEMOGLOBIN: 8.6 G/DL (ref 11.5–15.5)
HEMOGLOBIN: 8.7 G/DL (ref 11.5–15.5)
HEMOGLOBIN: 9.5 G/DL (ref 11.5–15.5)
HHB: 11.5 % (ref 0–5)
HHB: 2.2 % (ref 0–5)
HHB: 32.1 % (ref 0–5)
HHB: 4.8 % (ref 0–5)
HHB: 41.8 % (ref 0–5)
HHB: 8.5 % (ref 0–5)
HYPOCHROMIA: ABNORMAL
IMMATURE GRANULOCYTES #: 0.09 E9/L
IMMATURE GRANULOCYTES #: 0.11 E9/L
IMMATURE GRANULOCYTES #: 0.12 E9/L
IMMATURE GRANULOCYTES %: 0.6 % (ref 0–5)
IMMATURE GRANULOCYTES %: 0.7 % (ref 0–5)
IMMATURE GRANULOCYTES %: 1.2 % (ref 0–5)
INR BLD: 1.1
INR BLD: 1.1
IRON SATURATION: 12 % (ref 15–50)
IRON: 17 MCG/DL (ref 37–145)
KETONES, URINE: NEGATIVE MG/DL
KETONES, URINE: NEGATIVE MG/DL
LAB: ABNORMAL
LACTATE DEHYDROGENASE: 238 U/L (ref 135–214)
LACTATE DEHYDROGENASE: 345 U/L (ref 135–214)
LEUKOCYTE ESTERASE, URINE: ABNORMAL
LEUKOCYTE ESTERASE, URINE: NEGATIVE
LYMPHOCYTES ABSOLUTE: 0.13 E9/L (ref 1.5–4)
LYMPHOCYTES ABSOLUTE: 0.23 E9/L (ref 1.5–4)
LYMPHOCYTES ABSOLUTE: 0.25 E9/L (ref 1.5–4)
LYMPHOCYTES ABSOLUTE: 0.35 E9/L (ref 1.5–4)
LYMPHOCYTES ABSOLUTE: 0.44 E9/L (ref 1.5–4)
LYMPHOCYTES ABSOLUTE: 0.57 E9/L (ref 1.5–4)
LYMPHOCYTES ABSOLUTE: 0.71 E9/L (ref 1.5–4)
LYMPHOCYTES ABSOLUTE: 0.73 E9/L (ref 1.5–4)
LYMPHOCYTES ABSOLUTE: 0.86 E9/L (ref 1.5–4)
LYMPHOCYTES ABSOLUTE: 0.94 E9/L (ref 1.5–4)
LYMPHOCYTES ABSOLUTE: 1.02 E9/L (ref 1.5–4)
LYMPHOCYTES ABSOLUTE: 1.04 E9/L (ref 1.5–4)
LYMPHOCYTES ABSOLUTE: 1.25 E9/L (ref 1.5–4)
LYMPHOCYTES RELATIVE PERCENT: 0.9 % (ref 20–42)
LYMPHOCYTES RELATIVE PERCENT: 0.9 % (ref 20–42)
LYMPHOCYTES RELATIVE PERCENT: 1 % (ref 20–42)
LYMPHOCYTES RELATIVE PERCENT: 10.4 % (ref 20–42)
LYMPHOCYTES RELATIVE PERCENT: 2.6 % (ref 20–42)
LYMPHOCYTES RELATIVE PERCENT: 3.4 % (ref 20–42)
LYMPHOCYTES RELATIVE PERCENT: 3.5 % (ref 20–42)
LYMPHOCYTES RELATIVE PERCENT: 3.5 % (ref 20–42)
LYMPHOCYTES RELATIVE PERCENT: 4 % (ref 20–42)
LYMPHOCYTES RELATIVE PERCENT: 6.9 % (ref 20–42)
LYMPHOCYTES RELATIVE PERCENT: 7 % (ref 20–42)
LYMPHOCYTES RELATIVE PERCENT: 7.9 % (ref 20–42)
LYMPHOCYTES RELATIVE PERCENT: 8.5 % (ref 20–42)
Lab: ABNORMAL
MAGNESIUM: 2.1 MG/DL (ref 1.6–2.6)
MAGNESIUM: 2.2 MG/DL (ref 1.6–2.6)
MAGNESIUM: 2.4 MG/DL (ref 1.6–2.6)
MAGNESIUM: 2.5 MG/DL (ref 1.6–2.6)
MAGNESIUM: 2.7 MG/DL (ref 1.6–2.6)
MCH RBC QN AUTO: 29.1 PG (ref 26–35)
MCH RBC QN AUTO: 29.3 PG (ref 26–35)
MCH RBC QN AUTO: 29.4 PG (ref 26–35)
MCH RBC QN AUTO: 29.5 PG (ref 26–35)
MCH RBC QN AUTO: 29.5 PG (ref 26–35)
MCH RBC QN AUTO: 29.7 PG (ref 26–35)
MCH RBC QN AUTO: 29.8 PG (ref 26–35)
MCH RBC QN AUTO: 29.9 PG (ref 26–35)
MCH RBC QN AUTO: 30 PG (ref 26–35)
MCH RBC QN AUTO: 30 PG (ref 26–35)
MCH RBC QN AUTO: 30.4 PG (ref 26–35)
MCH RBC QN AUTO: 30.5 PG (ref 26–35)
MCH RBC QN AUTO: 30.6 PG (ref 26–35)
MCHC RBC AUTO-ENTMCNC: 28.5 % (ref 32–34.5)
MCHC RBC AUTO-ENTMCNC: 29.2 % (ref 32–34.5)
MCHC RBC AUTO-ENTMCNC: 29.3 % (ref 32–34.5)
MCHC RBC AUTO-ENTMCNC: 29.8 % (ref 32–34.5)
MCHC RBC AUTO-ENTMCNC: 29.8 % (ref 32–34.5)
MCHC RBC AUTO-ENTMCNC: 30 % (ref 32–34.5)
MCHC RBC AUTO-ENTMCNC: 30 % (ref 32–34.5)
MCHC RBC AUTO-ENTMCNC: 30.2 % (ref 32–34.5)
MCHC RBC AUTO-ENTMCNC: 30.3 % (ref 32–34.5)
MCHC RBC AUTO-ENTMCNC: 31 % (ref 32–34.5)
MCHC RBC AUTO-ENTMCNC: 31 % (ref 32–34.5)
MCHC RBC AUTO-ENTMCNC: 31.9 % (ref 32–34.5)
MCHC RBC AUTO-ENTMCNC: 32 % (ref 32–34.5)
MCV RBC AUTO: 100 FL (ref 80–99.9)
MCV RBC AUTO: 100.4 FL (ref 80–99.9)
MCV RBC AUTO: 100.4 FL (ref 80–99.9)
MCV RBC AUTO: 103.1 FL (ref 80–99.9)
MCV RBC AUTO: 93.3 FL (ref 80–99.9)
MCV RBC AUTO: 94 FL (ref 80–99.9)
MCV RBC AUTO: 97.6 FL (ref 80–99.9)
MCV RBC AUTO: 98.3 FL (ref 80–99.9)
MCV RBC AUTO: 98.5 FL (ref 80–99.9)
MCV RBC AUTO: 98.8 FL (ref 80–99.9)
MCV RBC AUTO: 98.9 FL (ref 80–99.9)
MCV RBC AUTO: 98.9 FL (ref 80–99.9)
MCV RBC AUTO: 99 FL (ref 80–99.9)
MCV RBC AUTO: 99 FL (ref 80–99.9)
MCV RBC AUTO: 99.6 FL (ref 80–99.9)
METAMYELOCYTES RELATIVE PERCENT: 0.9 % (ref 0–1)
METAMYELOCYTES RELATIVE PERCENT: 0.9 % (ref 0–1)
METER GLUCOSE: 174 MG/DL (ref 74–99)
METER GLUCOSE: 201 MG/DL (ref 74–99)
METER GLUCOSE: 205 MG/DL (ref 74–99)
METER GLUCOSE: 232 MG/DL (ref 74–99)
METHB: 0.3 % (ref 0–1.5)
METHB: 0.4 % (ref 0–1.5)
METHB: 0.5 % (ref 0–1.5)
METHB: 0.5 % (ref 0–1.5)
MODE: ABNORMAL
MONOCYTES ABSOLUTE: 0 E9/L (ref 0.1–0.95)
MONOCYTES ABSOLUTE: 0.08 E9/L (ref 0.1–0.95)
MONOCYTES ABSOLUTE: 0.15 E9/L (ref 0.1–0.95)
MONOCYTES ABSOLUTE: 0.18 E9/L (ref 0.1–0.95)
MONOCYTES ABSOLUTE: 0.23 E9/L (ref 0.1–0.95)
MONOCYTES ABSOLUTE: 0.24 E9/L (ref 0.1–0.95)
MONOCYTES ABSOLUTE: 0.25 E9/L (ref 0.1–0.95)
MONOCYTES ABSOLUTE: 0.47 E9/L (ref 0.1–0.95)
MONOCYTES ABSOLUTE: 0.54 E9/L (ref 0.1–0.95)
MONOCYTES ABSOLUTE: 0.86 E9/L (ref 0.1–0.95)
MONOCYTES ABSOLUTE: 1.35 E9/L (ref 0.1–0.95)
MONOCYTES ABSOLUTE: 1.42 E9/L (ref 0.1–0.95)
MONOCYTES ABSOLUTE: 1.63 E9/L (ref 0.1–0.95)
MONOCYTES RELATIVE PERCENT: 0.9 % (ref 2–12)
MONOCYTES RELATIVE PERCENT: 1 % (ref 2–12)
MONOCYTES RELATIVE PERCENT: 1.8 % (ref 2–12)
MONOCYTES RELATIVE PERCENT: 11.6 % (ref 2–12)
MONOCYTES RELATIVE PERCENT: 13.7 % (ref 2–12)
MONOCYTES RELATIVE PERCENT: 3.4 % (ref 2–12)
MONOCYTES RELATIVE PERCENT: 3.5 % (ref 2–12)
MONOCYTES RELATIVE PERCENT: 3.5 % (ref 2–12)
MONOCYTES RELATIVE PERCENT: 4.1 % (ref 2–12)
MONOCYTES RELATIVE PERCENT: 9.3 % (ref 2–12)
MRSA CULTURE ONLY: NORMAL
MYELOCYTE PERCENT: 0.9 % (ref 0–0)
NEUTROPHILS ABSOLUTE: 10.41 E9/L (ref 1.8–7.3)
NEUTROPHILS ABSOLUTE: 11.33 E9/L (ref 1.8–7.3)
NEUTROPHILS ABSOLUTE: 13.17 E9/L (ref 1.8–7.3)
NEUTROPHILS ABSOLUTE: 14.21 E9/L (ref 1.8–7.3)
NEUTROPHILS ABSOLUTE: 15.09 E9/L (ref 1.8–7.3)
NEUTROPHILS ABSOLUTE: 16.11 E9/L (ref 1.8–7.3)
NEUTROPHILS ABSOLUTE: 17.47 E9/L (ref 1.8–7.3)
NEUTROPHILS ABSOLUTE: 20.09 E9/L (ref 1.8–7.3)
NEUTROPHILS ABSOLUTE: 22.93 E9/L (ref 1.8–7.3)
NEUTROPHILS ABSOLUTE: 24.7 E9/L (ref 1.8–7.3)
NEUTROPHILS ABSOLUTE: 7.27 E9/L (ref 1.8–7.3)
NEUTROPHILS ABSOLUTE: 7.45 E9/L (ref 1.8–7.3)
NEUTROPHILS ABSOLUTE: 9.72 E9/L (ref 1.8–7.3)
NEUTROPHILS RELATIVE PERCENT: 73.9 % (ref 43–80)
NEUTROPHILS RELATIVE PERCENT: 79.1 % (ref 43–80)
NEUTROPHILS RELATIVE PERCENT: 85.9 % (ref 43–80)
NEUTROPHILS RELATIVE PERCENT: 87.7 % (ref 43–80)
NEUTROPHILS RELATIVE PERCENT: 89.7 % (ref 43–80)
NEUTROPHILS RELATIVE PERCENT: 92.2 % (ref 43–80)
NEUTROPHILS RELATIVE PERCENT: 93 % (ref 43–80)
NEUTROPHILS RELATIVE PERCENT: 94 % (ref 43–80)
NEUTROPHILS RELATIVE PERCENT: 95.6 % (ref 43–80)
NEUTROPHILS RELATIVE PERCENT: 95.6 % (ref 43–80)
NEUTROPHILS RELATIVE PERCENT: 98 % (ref 43–80)
NEUTROPHILS RELATIVE PERCENT: 98.3 % (ref 43–80)
NEUTROPHILS RELATIVE PERCENT: 99.1 % (ref 43–80)
NITRITE, URINE: NEGATIVE
NITRITE, URINE: POSITIVE
NUCLEATED RED BLOOD CELLS: 0.9 /100 WBC
O2 CONTENT: 10.7 ML/DL
O2 CONTENT: 11.4 ML/DL
O2 CONTENT: 11.8 ML/DL
O2 CONTENT: 12.4 ML/DL
O2 CONTENT: 6.8 ML/DL
O2 CONTENT: 8.9 ML/DL
O2 SATURATION: 58 % (ref 92–98.5)
O2 SATURATION: 67.6 % (ref 92–98.5)
O2 SATURATION: 88.4 % (ref 92–98.5)
O2 SATURATION: 91.4 % (ref 92–98.5)
O2 SATURATION: 95.2 % (ref 92–98.5)
O2 SATURATION: 97.8 % (ref 92–98.5)
O2HB: 57.7 % (ref 94–97)
O2HB: 67.1 % (ref 94–97)
O2HB: 87.7 % (ref 94–97)
O2HB: 90.8 % (ref 94–97)
O2HB: 94.5 % (ref 94–97)
O2HB: 97.1 % (ref 94–97)
OPERATOR ID: 2873
OPERATOR ID: 301
OPERATOR ID: 4001
OPERATOR ID: 4001
OPERATOR ID: 797
OPERATOR ID: 9689
ORGANISM: ABNORMAL
OVALOCYTES: ABNORMAL
PATIENT TEMP: 37 C
PCO2: 36 MMHG (ref 35–45)
PCO2: 37.8 MMHG (ref 35–45)
PCO2: 41.9 MMHG (ref 35–45)
PCO2: 42.5 MMHG (ref 35–45)
PCO2: 46.2 MMHG (ref 35–45)
PCO2: 63.7 MMHG (ref 35–45)
PDW BLD-RTO: 14.7 FL (ref 11.5–15)
PDW BLD-RTO: 14.8 FL (ref 11.5–15)
PDW BLD-RTO: 15.1 FL (ref 11.5–15)
PDW BLD-RTO: 15.3 FL (ref 11.5–15)
PDW BLD-RTO: 15.5 FL (ref 11.5–15)
PDW BLD-RTO: 15.7 FL (ref 11.5–15)
PDW BLD-RTO: 15.7 FL (ref 11.5–15)
PDW BLD-RTO: 15.9 FL (ref 11.5–15)
PDW BLD-RTO: 16.6 FL (ref 11.5–15)
PDW BLD-RTO: 16.8 FL (ref 11.5–15)
PDW BLD-RTO: 17.4 FL (ref 11.5–15)
PDW BLD-RTO: 17.4 FL (ref 11.5–15)
PDW BLD-RTO: 18.5 FL (ref 11.5–15)
PEEP/CPAP: 7 CMH2O
PFO2: 0.32 MMHG/%
PFO2: 0.65 MMHG/%
PFO2: 1.06 MMHG/%
PFO2: 1.13 MMHG/%
PFO2: 1.17 MMHG/%
PH BLOOD GAS: 7.35 (ref 7.35–7.45)
PH BLOOD GAS: 7.42 (ref 7.35–7.45)
PH BLOOD GAS: 7.43 (ref 7.35–7.45)
PH BLOOD GAS: 7.44 (ref 7.35–7.45)
PH BLOOD GAS: 7.44 (ref 7.35–7.45)
PH BLOOD GAS: 7.45 (ref 7.35–7.45)
PH UA: 5.5 (ref 5–9)
PH UA: 6 (ref 5–9)
PHOSPHORUS: 2.9 MG/DL (ref 2.5–4.5)
PHOSPHORUS: 2.9 MG/DL (ref 2.5–4.5)
PHOSPHORUS: 3.7 MG/DL (ref 2.5–4.5)
PHOSPHORUS: 3.7 MG/DL (ref 2.5–4.5)
PHOSPHORUS: 4.4 MG/DL (ref 2.5–4.5)
PIP: 12 CMH2O
PLATELET # BLD: 136 E9/L (ref 130–450)
PLATELET # BLD: 163 E9/L (ref 130–450)
PLATELET # BLD: 175 E9/L (ref 130–450)
PLATELET # BLD: 187 E9/L (ref 130–450)
PLATELET # BLD: 193 E9/L (ref 130–450)
PLATELET # BLD: 193 E9/L (ref 130–450)
PLATELET # BLD: 194 E9/L (ref 130–450)
PLATELET # BLD: 371 E9/L (ref 130–450)
PLATELET # BLD: 427 E9/L (ref 130–450)
PLATELET # BLD: 436 E9/L (ref 130–450)
PLATELET # BLD: 441 E9/L (ref 130–450)
PLATELET # BLD: 450 E9/L (ref 130–450)
PLATELET # BLD: 451 E9/L (ref 130–450)
PLATELET # BLD: 461 E9/L (ref 130–450)
PLATELET # BLD: 510 E9/L (ref 130–450)
PMV BLD AUTO: 10.2 FL (ref 7–12)
PMV BLD AUTO: 10.2 FL (ref 7–12)
PMV BLD AUTO: 10.3 FL (ref 7–12)
PMV BLD AUTO: 10.4 FL (ref 7–12)
PMV BLD AUTO: 10.5 FL (ref 7–12)
PMV BLD AUTO: 10.5 FL (ref 7–12)
PMV BLD AUTO: 10.6 FL (ref 7–12)
PO2: 116.7 MMHG (ref 75–100)
PO2: 32.2 MMHG (ref 75–100)
PO2: 36.6 MMHG (ref 75–100)
PO2: 56.5 MMHG (ref 75–100)
PO2: 65 MMHG (ref 75–100)
PO2: 84.6 MMHG (ref 75–100)
POIKILOCYTES: ABNORMAL
POLYCHROMASIA: ABNORMAL
POTASSIUM SERPL-SCNC: 3.4 MMOL/L (ref 3.5–5)
POTASSIUM SERPL-SCNC: 3.5 MMOL/L (ref 3.5–5)
POTASSIUM SERPL-SCNC: 3.6 MMOL/L (ref 3.5–5)
POTASSIUM SERPL-SCNC: 3.9 MMOL/L (ref 3.5–5)
POTASSIUM SERPL-SCNC: 4 MMOL/L (ref 3.5–5)
POTASSIUM SERPL-SCNC: 4.1 MMOL/L (ref 3.5–5)
POTASSIUM SERPL-SCNC: 4.2 MMOL/L (ref 3.5–5)
POTASSIUM SERPL-SCNC: 4.4 MMOL/L (ref 3.5–5)
POTASSIUM SERPL-SCNC: 4.5 MMOL/L (ref 3.5–5)
POTASSIUM SERPL-SCNC: 4.6 MMOL/L (ref 3.5–5)
POTASSIUM SERPL-SCNC: 4.9 MMOL/L (ref 3.5–5)
POTASSIUM SERPL-SCNC: 5 MMOL/L (ref 3.5–5)
POTASSIUM SERPL-SCNC: 5 MMOL/L (ref 3.5–5)
POTASSIUM SERPL-SCNC: 5.1 MMOL/L (ref 3.5–5)
POTASSIUM SERPL-SCNC: 5.4 MMOL/L (ref 3.5–5)
POTASSIUM SERPL-SCNC: 5.7 MMOL/L (ref 3.5–5)
POTASSIUM SERPL-SCNC: 5.8 MMOL/L (ref 3.5–5)
POTASSIUM SERPL-SCNC: 6.1 MMOL/L (ref 3.5–5)
POTASSIUM SERPL-SCNC: 6.4 MMOL/L (ref 3.5–5)
PROCALCITONIN: 0.63 NG/ML (ref 0–0.08)
PROCALCITONIN: 0.92 NG/ML (ref 0–0.08)
PROTEIN UA: 30 MG/DL
PROTEIN UA: NORMAL MG/DL
PROTHROMBIN TIME: 12.3 SEC (ref 9.3–12.4)
PROTHROMBIN TIME: 12.8 SEC (ref 9.3–12.4)
PS: 12 CMH20
PS: 12 CMH20
RBC # BLD: 2.38 E12/L (ref 3.5–5.5)
RBC # BLD: 2.4 E12/L (ref 3.5–5.5)
RBC # BLD: 2.55 E12/L (ref 3.5–5.5)
RBC # BLD: 2.68 E12/L (ref 3.5–5.5)
RBC # BLD: 2.71 E12/L (ref 3.5–5.5)
RBC # BLD: 2.72 E12/L (ref 3.5–5.5)
RBC # BLD: 2.72 E12/L (ref 3.5–5.5)
RBC # BLD: 2.75 E12/L (ref 3.5–5.5)
RBC # BLD: 2.75 E12/L (ref 3.5–5.5)
RBC # BLD: 2.83 E12/L (ref 3.5–5.5)
RBC # BLD: 2.9 E12/L (ref 3.5–5.5)
RBC # BLD: 2.9 E12/L (ref 3.5–5.5)
RBC # BLD: 2.97 E12/L (ref 3.5–5.5)
RBC # BLD: 3.27 E12/L (ref 3.5–5.5)
RBC # BLD: 3.59 E12/L (ref 3.5–5.5)
RBC # BLD: NORMAL 10*6/UL
RBC UA: >20 /HPF (ref 0–2)
RBC UA: ABNORMAL /HPF (ref 0–2)
RENAL EPITHELIAL, UA: ABNORMAL /HPF
RI(T): 19.05
RI(T): 4.37
RI(T): 4.5
RI(T): 4.99
RI(T): 9
SARS-COV-2, NAAT: DETECTED
SARS-COV-2, NAAT: NOT DETECTED
SCHISTOCYTES: ABNORMAL
SCHISTOCYTES: ABNORMAL
SMEAR, RESPIRATORY: ABNORMAL
SODIUM BLD-SCNC: 135 MMOL/L (ref 132–146)
SODIUM BLD-SCNC: 137 MMOL/L (ref 132–146)
SODIUM BLD-SCNC: 138 MMOL/L (ref 132–146)
SODIUM BLD-SCNC: 138 MMOL/L (ref 132–146)
SODIUM BLD-SCNC: 139 MMOL/L (ref 132–146)
SODIUM BLD-SCNC: 139 MMOL/L (ref 132–146)
SODIUM BLD-SCNC: 140 MMOL/L (ref 132–146)
SODIUM BLD-SCNC: 140 MMOL/L (ref 132–146)
SODIUM BLD-SCNC: 141 MMOL/L (ref 132–146)
SODIUM BLD-SCNC: 142 MMOL/L (ref 132–146)
SODIUM BLD-SCNC: 143 MMOL/L (ref 132–146)
SODIUM BLD-SCNC: 146 MMOL/L (ref 132–146)
SODIUM BLD-SCNC: 146 MMOL/L (ref 132–146)
SODIUM BLD-SCNC: 147 MMOL/L (ref 132–146)
SODIUM BLD-SCNC: 148 MMOL/L (ref 132–146)
SODIUM BLD-SCNC: 150 MMOL/L (ref 132–146)
SOURCE, BLOOD GAS: ABNORMAL
SPECIFIC GRAVITY UA: 1.02 (ref 1–1.03)
SPECIFIC GRAVITY UA: >=1.03 (ref 1–1.03)
TARGET CELLS: ABNORMAL
TEAR DROP CELLS: ABNORMAL
TEAR DROP CELLS: ABNORMAL
THB: 10 G/DL (ref 11.5–16.5)
THB: 8.3 G/DL (ref 11.5–16.5)
THB: 8.3 G/DL (ref 11.5–16.5)
THB: 8.5 G/DL (ref 11.5–16.5)
THB: 8.5 G/DL (ref 11.5–16.5)
THB: 9.4 G/DL (ref 11.5–16.5)
TIME ANALYZED: 1640
TIME ANALYZED: 1735
TIME ANALYZED: 503
TIME ANALYZED: 509
TIME ANALYZED: 527
TIME ANALYZED: 540
TOTAL CK: 895 U/L (ref 20–180)
TOTAL IRON BINDING CAPACITY: 138 MCG/DL (ref 250–450)
TOTAL PROTEIN: 4.8 G/DL (ref 6.4–8.3)
TOTAL PROTEIN: 5.1 G/DL (ref 6.4–8.3)
TOTAL PROTEIN: 5.2 G/DL (ref 6.4–8.3)
TOTAL PROTEIN: 5.3 G/DL (ref 6.4–8.3)
TOTAL PROTEIN: 5.4 G/DL (ref 6.4–8.3)
TOTAL PROTEIN: 5.5 G/DL (ref 6.4–8.3)
TOTAL PROTEIN: 5.5 G/DL (ref 6.4–8.3)
TOTAL PROTEIN: 5.8 G/DL (ref 6.4–8.3)
TOTAL PROTEIN: 6 G/DL (ref 6.4–8.3)
TOTAL PROTEIN: 6.6 G/DL (ref 6.4–8.3)
TROPONIN, HIGH SENSITIVITY: 25 NG/L (ref 0–9)
URINE CULTURE, ROUTINE: NORMAL
URINE CULTURE, ROUTINE: NORMAL
UROBILINOGEN, URINE: 0.2 E.U./DL
UROBILINOGEN, URINE: 0.2 E.U./DL
VANCOMYCIN RANDOM: 14.5 MCG/ML (ref 5–40)
VANCOMYCIN RANDOM: 14.8 MCG/ML (ref 5–40)
VANCOMYCIN RANDOM: 15.2 MCG/ML (ref 5–40)
VANCOMYCIN RANDOM: 4.8 MCG/ML (ref 5–40)
VITAMIN B-12: 460 PG/ML (ref 211–946)
WBC # BLD: 10.1 E9/L (ref 4.5–11.5)
WBC # BLD: 11.7 E9/L (ref 4.5–11.5)
WBC # BLD: 11.8 E9/L (ref 4.5–11.5)
WBC # BLD: 12.3 E9/L (ref 4.5–11.5)
WBC # BLD: 13.3 E9/L (ref 4.5–11.5)
WBC # BLD: 14.8 E9/L (ref 4.5–11.5)
WBC # BLD: 15.7 E9/L (ref 4.5–11.5)
WBC # BLD: 17.6 E9/L (ref 4.5–11.5)
WBC # BLD: 17.9 E9/L (ref 4.5–11.5)
WBC # BLD: 18.2 E9/L (ref 4.5–11.5)
WBC # BLD: 21.6 E9/L (ref 4.5–11.5)
WBC # BLD: 23.4 E9/L (ref 4.5–11.5)
WBC # BLD: 25.2 E9/L (ref 4.5–11.5)
WBC # BLD: 8.1 E9/L (ref 4.5–11.5)
WBC # BLD: 9.8 E9/L (ref 4.5–11.5)
WBC UA: >20 /HPF (ref 0–5)
WBC UA: ABNORMAL /HPF (ref 0–5)

## 2022-01-01 PROCEDURE — 80053 COMPREHEN METABOLIC PANEL: CPT

## 2022-01-01 PROCEDURE — 93005 ELECTROCARDIOGRAM TRACING: CPT | Performed by: INTERNAL MEDICINE

## 2022-01-01 PROCEDURE — 2580000003 HC RX 258: Performed by: INTERNAL MEDICINE

## 2022-01-01 PROCEDURE — 6370000000 HC RX 637 (ALT 250 FOR IP): Performed by: INTERNAL MEDICINE

## 2022-01-01 PROCEDURE — 0DH63UZ INSERTION OF FEEDING DEVICE INTO STOMACH, PERCUTANEOUS APPROACH: ICD-10-PCS | Performed by: INTERNAL MEDICINE

## 2022-01-01 PROCEDURE — 84100 ASSAY OF PHOSPHORUS: CPT

## 2022-01-01 PROCEDURE — 6360000002 HC RX W HCPCS

## 2022-01-01 PROCEDURE — 99232 SBSQ HOSP IP/OBS MODERATE 35: CPT | Performed by: INTERNAL MEDICINE

## 2022-01-01 PROCEDURE — 87205 SMEAR GRAM STAIN: CPT

## 2022-01-01 PROCEDURE — 02HV33Z INSERTION OF INFUSION DEVICE INTO SUPERIOR VENA CAVA, PERCUTANEOUS APPROACH: ICD-10-PCS | Performed by: INTERNAL MEDICINE

## 2022-01-01 PROCEDURE — C9113 INJ PANTOPRAZOLE SODIUM, VIA: HCPCS | Performed by: INTERNAL MEDICINE

## 2022-01-01 PROCEDURE — 6360000002 HC RX W HCPCS: Performed by: SPECIALIST

## 2022-01-01 PROCEDURE — 1200000000 HC SEMI PRIVATE

## 2022-01-01 PROCEDURE — 80202 ASSAY OF VANCOMYCIN: CPT

## 2022-01-01 PROCEDURE — 2580000003 HC RX 258: Performed by: EMERGENCY MEDICINE

## 2022-01-01 PROCEDURE — 6370000000 HC RX 637 (ALT 250 FOR IP): Performed by: SPECIALIST

## 2022-01-01 PROCEDURE — 84145 PROCALCITONIN (PCT): CPT

## 2022-01-01 PROCEDURE — 70450 CT HEAD/BRAIN W/O DYE: CPT

## 2022-01-01 PROCEDURE — 6360000002 HC RX W HCPCS: Performed by: STUDENT IN AN ORGANIZED HEALTH CARE EDUCATION/TRAINING PROGRAM

## 2022-01-01 PROCEDURE — 73130 X-RAY EXAM OF HAND: CPT

## 2022-01-01 PROCEDURE — 71045 X-RAY EXAM CHEST 1 VIEW: CPT

## 2022-01-01 PROCEDURE — 73110 X-RAY EXAM OF WRIST: CPT

## 2022-01-01 PROCEDURE — 3609013300 HC EGD TUBE PLACEMENT: Performed by: INTERNAL MEDICINE

## 2022-01-01 PROCEDURE — 73502 X-RAY EXAM HIP UNI 2-3 VIEWS: CPT

## 2022-01-01 PROCEDURE — 2580000003 HC RX 258: Performed by: SPECIALIST

## 2022-01-01 PROCEDURE — 2700000000 HC OXYGEN THERAPY PER DAY

## 2022-01-01 PROCEDURE — 6360000002 HC RX W HCPCS: Performed by: INTERNAL MEDICINE

## 2022-01-01 PROCEDURE — 94640 AIRWAY INHALATION TREATMENT: CPT

## 2022-01-01 PROCEDURE — 82805 BLOOD GASES W/O2 SATURATION: CPT

## 2022-01-01 PROCEDURE — 6370000000 HC RX 637 (ALT 250 FOR IP): Performed by: STUDENT IN AN ORGANIZED HEALTH CARE EDUCATION/TRAINING PROGRAM

## 2022-01-01 PROCEDURE — 6370000000 HC RX 637 (ALT 250 FOR IP): Performed by: HOSPITALIST

## 2022-01-01 PROCEDURE — 85027 COMPLETE CBC AUTOMATED: CPT

## 2022-01-01 PROCEDURE — 85018 HEMOGLOBIN: CPT

## 2022-01-01 PROCEDURE — 36415 COLL VENOUS BLD VENIPUNCTURE: CPT

## 2022-01-01 PROCEDURE — 86140 C-REACTIVE PROTEIN: CPT

## 2022-01-01 PROCEDURE — 74230 X-RAY XM SWLNG FUNCJ C+: CPT

## 2022-01-01 PROCEDURE — 93005 ELECTROCARDIOGRAM TRACING: CPT

## 2022-01-01 PROCEDURE — 80048 BASIC METABOLIC PNL TOTAL CA: CPT

## 2022-01-01 PROCEDURE — 36569 INSJ PICC 5 YR+ W/O IMAGING: CPT

## 2022-01-01 PROCEDURE — 2500000003 HC RX 250 WO HCPCS: Performed by: SPECIALIST

## 2022-01-01 PROCEDURE — 2000000000 HC ICU R&B

## 2022-01-01 PROCEDURE — 3209999900 FLUORO FOR SURGICAL PROCEDURES

## 2022-01-01 PROCEDURE — 97530 THERAPEUTIC ACTIVITIES: CPT

## 2022-01-01 PROCEDURE — 76937 US GUIDE VASCULAR ACCESS: CPT

## 2022-01-01 PROCEDURE — 73501 X-RAY EXAM HIP UNI 1 VIEW: CPT

## 2022-01-01 PROCEDURE — 94660 CPAP INITIATION&MGMT: CPT

## 2022-01-01 PROCEDURE — 36600 WITHDRAWAL OF ARTERIAL BLOOD: CPT

## 2022-01-01 PROCEDURE — 85025 COMPLETE CBC W/AUTO DIFF WBC: CPT

## 2022-01-01 PROCEDURE — 2720000010 HC SURG SUPPLY STERILE

## 2022-01-01 PROCEDURE — C1713 ANCHOR/SCREW BN/BN,TIS/BN: HCPCS | Performed by: ORTHOPAEDIC SURGERY

## 2022-01-01 PROCEDURE — 93005 ELECTROCARDIOGRAM TRACING: CPT | Performed by: EMERGENCY MEDICINE

## 2022-01-01 PROCEDURE — 97110 THERAPEUTIC EXERCISES: CPT

## 2022-01-01 PROCEDURE — 83735 ASSAY OF MAGNESIUM: CPT

## 2022-01-01 PROCEDURE — 82746 ASSAY OF FOLIC ACID SERUM: CPT

## 2022-01-01 PROCEDURE — 97165 OT EVAL LOW COMPLEX 30 MIN: CPT

## 2022-01-01 PROCEDURE — 87081 CULTURE SCREEN ONLY: CPT

## 2022-01-01 PROCEDURE — 99233 SBSQ HOSP IP/OBS HIGH 50: CPT | Performed by: INTERNAL MEDICINE

## 2022-01-01 PROCEDURE — 87040 BLOOD CULTURE FOR BACTERIA: CPT

## 2022-01-01 PROCEDURE — 2580000003 HC RX 258: Performed by: STUDENT IN AN ORGANIZED HEALTH CARE EDUCATION/TRAINING PROGRAM

## 2022-01-01 PROCEDURE — 87186 SC STD MICRODIL/AGAR DIL: CPT

## 2022-01-01 PROCEDURE — 92526 ORAL FUNCTION THERAPY: CPT

## 2022-01-01 PROCEDURE — C1751 CATH, INF, PER/CENT/MIDLINE: HCPCS

## 2022-01-01 PROCEDURE — P9045 ALBUMIN (HUMAN), 5%, 250 ML: HCPCS

## 2022-01-01 PROCEDURE — 97110 THERAPEUTIC EXERCISES: CPT | Performed by: PHYSICAL THERAPIST

## 2022-01-01 PROCEDURE — 92526 ORAL FUNCTION THERAPY: CPT | Performed by: SPEECH-LANGUAGE PATHOLOGIST

## 2022-01-01 PROCEDURE — 36592 COLLECT BLOOD FROM PICC: CPT

## 2022-01-01 PROCEDURE — 99285 EMERGENCY DEPT VISIT HI MDM: CPT

## 2022-01-01 PROCEDURE — 85610 PROTHROMBIN TIME: CPT

## 2022-01-01 PROCEDURE — 84484 ASSAY OF TROPONIN QUANT: CPT

## 2022-01-01 PROCEDURE — 2500000003 HC RX 250 WO HCPCS: Performed by: INTERNAL MEDICINE

## 2022-01-01 PROCEDURE — 2720000010 HC SURG SUPPLY STERILE: Performed by: ORTHOPAEDIC SURGERY

## 2022-01-01 PROCEDURE — 7100000011 HC PHASE II RECOVERY - ADDTL 15 MIN: Performed by: INTERNAL MEDICINE

## 2022-01-01 PROCEDURE — 3700000000 HC ANESTHESIA ATTENDED CARE: Performed by: ORTHOPAEDIC SURGERY

## 2022-01-01 PROCEDURE — 97530 THERAPEUTIC ACTIVITIES: CPT | Performed by: PHYSICAL THERAPIST

## 2022-01-01 PROCEDURE — 2500000003 HC RX 250 WO HCPCS

## 2022-01-01 PROCEDURE — 7100000010 HC PHASE II RECOVERY - FIRST 15 MIN: Performed by: INTERNAL MEDICINE

## 2022-01-01 PROCEDURE — 85730 THROMBOPLASTIN TIME PARTIAL: CPT

## 2022-01-01 PROCEDURE — 83540 ASSAY OF IRON: CPT

## 2022-01-01 PROCEDURE — 83550 IRON BINDING TEST: CPT

## 2022-01-01 PROCEDURE — 82962 GLUCOSE BLOOD TEST: CPT

## 2022-01-01 PROCEDURE — 6360000002 HC RX W HCPCS: Performed by: NURSE ANESTHETIST, CERTIFIED REGISTERED

## 2022-01-01 PROCEDURE — 83615 LACTATE (LD) (LDH) ENZYME: CPT

## 2022-01-01 PROCEDURE — 99231 SBSQ HOSP IP/OBS SF/LOW 25: CPT | Performed by: ORTHOPAEDIC SURGERY

## 2022-01-01 PROCEDURE — 0BJ08ZZ INSPECTION OF TRACHEOBRONCHIAL TREE, VIA NATURAL OR ARTIFICIAL OPENING ENDOSCOPIC: ICD-10-PCS | Performed by: INTERNAL MEDICINE

## 2022-01-01 PROCEDURE — 3700000000 HC ANESTHESIA ATTENDED CARE: Performed by: INTERNAL MEDICINE

## 2022-01-01 PROCEDURE — 87635 SARS-COV-2 COVID-19 AMP PRB: CPT

## 2022-01-01 PROCEDURE — 82607 VITAMIN B-12: CPT

## 2022-01-01 PROCEDURE — 99211 OFF/OP EST MAY X REQ PHY/QHP: CPT

## 2022-01-01 PROCEDURE — 82728 ASSAY OF FERRITIN: CPT

## 2022-01-01 PROCEDURE — 2709999900 HC NON-CHARGEABLE SUPPLY: Performed by: INTERNAL MEDICINE

## 2022-01-01 PROCEDURE — 2580000003 HC RX 258: Performed by: ANESTHESIOLOGY

## 2022-01-01 PROCEDURE — 2500000003 HC RX 250 WO HCPCS: Performed by: NURSE ANESTHETIST, CERTIFIED REGISTERED

## 2022-01-01 PROCEDURE — 97161 PT EVAL LOW COMPLEX 20 MIN: CPT | Performed by: PHYSICAL THERAPIST

## 2022-01-01 PROCEDURE — 51798 US URINE CAPACITY MEASURE: CPT

## 2022-01-01 PROCEDURE — 87088 URINE BACTERIA CULTURE: CPT

## 2022-01-01 PROCEDURE — 7100000000 HC PACU RECOVERY - FIRST 15 MIN: Performed by: ORTHOPAEDIC SURGERY

## 2022-01-01 PROCEDURE — 86850 RBC ANTIBODY SCREEN: CPT

## 2022-01-01 PROCEDURE — 31500 INSERT EMERGENCY AIRWAY: CPT

## 2022-01-01 PROCEDURE — 27245 TREAT THIGH FRACTURE: CPT | Performed by: ORTHOPAEDIC SURGERY

## 2022-01-01 PROCEDURE — 87077 CULTURE AEROBIC IDENTIFY: CPT

## 2022-01-01 PROCEDURE — 3600000004 HC SURGERY LEVEL 4 BASE: Performed by: ORTHOPAEDIC SURGERY

## 2022-01-01 PROCEDURE — 7100000001 HC PACU RECOVERY - ADDTL 15 MIN: Performed by: ORTHOPAEDIC SURGERY

## 2022-01-01 PROCEDURE — 72125 CT NECK SPINE W/O DYE: CPT

## 2022-01-01 PROCEDURE — 81001 URINALYSIS AUTO W/SCOPE: CPT

## 2022-01-01 PROCEDURE — 87206 SMEAR FLUORESCENT/ACID STAI: CPT

## 2022-01-01 PROCEDURE — 0QS604Z REPOSITION RIGHT UPPER FEMUR WITH INTERNAL FIXATION DEVICE, OPEN APPROACH: ICD-10-PCS | Performed by: ORTHOPAEDIC SURGERY

## 2022-01-01 PROCEDURE — 2580000003 HC RX 258

## 2022-01-01 PROCEDURE — 2709999900 HC NON-CHARGEABLE SUPPLY: Performed by: ORTHOPAEDIC SURGERY

## 2022-01-01 PROCEDURE — 92611 MOTION FLUOROSCOPY/SWALLOW: CPT

## 2022-01-01 PROCEDURE — 82550 ASSAY OF CK (CPK): CPT

## 2022-01-01 PROCEDURE — 3609027000 HC BRONCHOSCOPY: Performed by: INTERNAL MEDICINE

## 2022-01-01 PROCEDURE — 87070 CULTURE OTHR SPECIMN AEROBIC: CPT

## 2022-01-01 PROCEDURE — 3600000014 HC SURGERY LEVEL 4 ADDTL 15MIN: Performed by: ORTHOPAEDIC SURGERY

## 2022-01-01 PROCEDURE — 3700000001 HC ADD 15 MINUTES (ANESTHESIA): Performed by: ORTHOPAEDIC SURGERY

## 2022-01-01 PROCEDURE — 2780000010 HC IMPLANT OTHER: Performed by: ORTHOPAEDIC SURGERY

## 2022-01-01 PROCEDURE — 2060000000 HC ICU INTERMEDIATE R&B

## 2022-01-01 PROCEDURE — 3700000001 HC ADD 15 MINUTES (ANESTHESIA): Performed by: INTERNAL MEDICINE

## 2022-01-01 PROCEDURE — 86900 BLOOD TYPING SEROLOGIC ABO: CPT

## 2022-01-01 PROCEDURE — 73552 X-RAY EXAM OF FEMUR 2/>: CPT

## 2022-01-01 PROCEDURE — 3E0G76Z INTRODUCTION OF NUTRITIONAL SUBSTANCE INTO UPPER GI, VIA NATURAL OR ARTIFICIAL OPENING: ICD-10-PCS | Performed by: INTERNAL MEDICINE

## 2022-01-01 PROCEDURE — 86901 BLOOD TYPING SEROLOGIC RH(D): CPT

## 2022-01-01 DEVICE — LAG SCREW, TI
Type: IMPLANTABLE DEVICE | Site: FEMUR | Status: FUNCTIONAL
Brand: GAMMA

## 2022-01-01 DEVICE — LOCKING SCREW, FULLY THREADED: Type: IMPLANTABLE DEVICE | Site: FEMUR | Status: FUNCTIONAL

## 2022-01-01 DEVICE — K-WIRE
Type: IMPLANTABLE DEVICE | Status: FUNCTIONAL
Brand: GAMMA

## 2022-01-01 RX ORDER — SODIUM CHLORIDE 0.9 % (FLUSH) 0.9 %
5-40 SYRINGE (ML) INJECTION PRN
Status: DISCONTINUED | OUTPATIENT
Start: 2022-01-01 | End: 2022-01-01 | Stop reason: HOSPADM

## 2022-01-01 RX ORDER — 0.9 % SODIUM CHLORIDE 0.9 %
500 INTRAVENOUS SOLUTION INTRAVENOUS ONCE
Status: COMPLETED | OUTPATIENT
Start: 2022-01-01 | End: 2022-01-01

## 2022-01-01 RX ORDER — SODIUM CHLORIDE 9 MG/ML
INJECTION, SOLUTION INTRAVENOUS CONTINUOUS
Status: CANCELLED | OUTPATIENT
Start: 2022-01-01

## 2022-01-01 RX ORDER — POLYETHYLENE GLYCOL 3350 17 G/17G
17 POWDER, FOR SOLUTION ORAL DAILY PRN
Status: DISCONTINUED | OUTPATIENT
Start: 2022-01-01 | End: 2022-01-01

## 2022-01-01 RX ORDER — NEOSTIGMINE METHYLSULFATE 1 MG/ML
INJECTION, SOLUTION INTRAVENOUS PRN
Status: DISCONTINUED | OUTPATIENT
Start: 2022-01-01 | End: 2022-01-01 | Stop reason: SDUPTHER

## 2022-01-01 RX ORDER — METOPROLOL TARTRATE 50 MG/1
50 TABLET, FILM COATED ORAL 3 TIMES DAILY
Status: DISCONTINUED | OUTPATIENT
Start: 2022-01-01 | End: 2022-01-01

## 2022-01-01 RX ORDER — MEPERIDINE HYDROCHLORIDE 25 MG/ML
INJECTION INTRAMUSCULAR; INTRAVENOUS; SUBCUTANEOUS
Status: COMPLETED
Start: 2022-01-01 | End: 2022-01-01

## 2022-01-01 RX ORDER — FLUCONAZOLE 2 MG/ML
200 INJECTION, SOLUTION INTRAVENOUS EVERY 24 HOURS
Status: DISCONTINUED | OUTPATIENT
Start: 2022-01-01 | End: 2022-01-01

## 2022-01-01 RX ORDER — SODIUM CHLORIDE 0.9 % (FLUSH) 0.9 %
5-40 SYRINGE (ML) INJECTION PRN
Status: DISCONTINUED | OUTPATIENT
Start: 2022-01-01 | End: 2022-01-01

## 2022-01-01 RX ORDER — FUROSEMIDE 10 MG/ML
40 INJECTION INTRAMUSCULAR; INTRAVENOUS ONCE
Status: COMPLETED | OUTPATIENT
Start: 2022-01-01 | End: 2022-01-01

## 2022-01-01 RX ORDER — INSULIN LISPRO 100 [IU]/ML
0-4 INJECTION, SOLUTION INTRAVENOUS; SUBCUTANEOUS NIGHTLY
Status: DISCONTINUED | OUTPATIENT
Start: 2022-01-01 | End: 2022-01-01

## 2022-01-01 RX ORDER — PROPOFOL 10 MG/ML
INJECTION, EMULSION INTRAVENOUS PRN
Status: DISCONTINUED | OUTPATIENT
Start: 2022-01-01 | End: 2022-01-01 | Stop reason: SDUPTHER

## 2022-01-01 RX ORDER — FENTANYL CITRATE 50 UG/ML
50 INJECTION, SOLUTION INTRAMUSCULAR; INTRAVENOUS EVERY 5 MIN PRN
Status: DISCONTINUED | OUTPATIENT
Start: 2022-01-01 | End: 2022-01-01 | Stop reason: HOSPADM

## 2022-01-01 RX ORDER — ETOMIDATE 2 MG/ML
INJECTION INTRAVENOUS PRN
Status: DISCONTINUED | OUTPATIENT
Start: 2022-01-01 | End: 2022-01-01 | Stop reason: SDUPTHER

## 2022-01-01 RX ORDER — SODIUM CHLORIDE 0.9 % (FLUSH) 0.9 %
5-40 SYRINGE (ML) INJECTION EVERY 12 HOURS SCHEDULED
Status: DISCONTINUED | OUTPATIENT
Start: 2022-01-01 | End: 2022-01-01

## 2022-01-01 RX ORDER — MORPHINE SULFATE 2 MG/ML
2 INJECTION, SOLUTION INTRAMUSCULAR; INTRAVENOUS
Status: DISCONTINUED | OUTPATIENT
Start: 2022-01-01 | End: 2022-01-01

## 2022-01-01 RX ORDER — FENTANYL CITRATE 50 UG/ML
INJECTION, SOLUTION INTRAMUSCULAR; INTRAVENOUS
Status: COMPLETED
Start: 2022-01-01 | End: 2022-01-01

## 2022-01-01 RX ORDER — LIDOCAINE HYDROCHLORIDE 40 MG/ML
INJECTION, SOLUTION RETROBULBAR; TOPICAL PRN
Status: DISCONTINUED | OUTPATIENT
Start: 2022-01-01 | End: 2022-01-01 | Stop reason: ALTCHOICE

## 2022-01-01 RX ORDER — ATROPINE SULFATE 0.1 MG/ML
INJECTION INTRAVENOUS
Status: DISPENSED
Start: 2022-01-01 | End: 2022-01-01

## 2022-01-01 RX ORDER — DEXMEDETOMIDINE HYDROCHLORIDE 4 UG/ML
0.2 INJECTION, SOLUTION INTRAVENOUS CONTINUOUS
Status: DISCONTINUED | OUTPATIENT
Start: 2022-01-01 | End: 2022-01-01

## 2022-01-01 RX ORDER — OXYCODONE HYDROCHLORIDE 5 MG/1
5 TABLET ORAL EVERY 4 HOURS PRN
Status: DISCONTINUED | OUTPATIENT
Start: 2022-01-01 | End: 2022-01-01

## 2022-01-01 RX ORDER — DEXTROSE AND SODIUM CHLORIDE 5; .9 G/100ML; G/100ML
INJECTION, SOLUTION INTRAVENOUS CONTINUOUS
Status: DISCONTINUED | OUTPATIENT
Start: 2022-01-01 | End: 2022-01-01

## 2022-01-01 RX ORDER — INSULIN LISPRO 100 [IU]/ML
0-4 INJECTION, SOLUTION INTRAVENOUS; SUBCUTANEOUS EVERY 6 HOURS
Status: DISCONTINUED | OUTPATIENT
Start: 2022-01-01 | End: 2022-01-01

## 2022-01-01 RX ORDER — CLONIDINE 0.2 MG/24H
1 PATCH, EXTENDED RELEASE TRANSDERMAL WEEKLY
Status: DISCONTINUED | OUTPATIENT
Start: 2022-01-01 | End: 2022-01-01

## 2022-01-01 RX ORDER — CEFAZOLIN SODIUM 1 G/3ML
INJECTION, POWDER, FOR SOLUTION INTRAMUSCULAR; INTRAVENOUS PRN
Status: DISCONTINUED | OUTPATIENT
Start: 2022-01-01 | End: 2022-01-01 | Stop reason: SDUPTHER

## 2022-01-01 RX ORDER — METOPROLOL TARTRATE 50 MG/1
50 TABLET, FILM COATED ORAL 2 TIMES DAILY
Qty: 60 TABLET | Refills: 3 | Status: SHIPPED | OUTPATIENT
Start: 2022-01-01

## 2022-01-01 RX ORDER — MORPHINE SULFATE 4 MG/ML
4 INJECTION, SOLUTION INTRAMUSCULAR; INTRAVENOUS
Status: DISCONTINUED | OUTPATIENT
Start: 2022-01-01 | End: 2022-01-01

## 2022-01-01 RX ORDER — DEXMEDETOMIDINE HYDROCHLORIDE 4 UG/ML
.1-1.5 INJECTION, SOLUTION INTRAVENOUS CONTINUOUS
Status: DISCONTINUED | OUTPATIENT
Start: 2022-01-01 | End: 2022-01-01

## 2022-01-01 RX ORDER — 0.9 % SODIUM CHLORIDE 0.9 %
1000 INTRAVENOUS SOLUTION INTRAVENOUS ONCE
Status: COMPLETED | OUTPATIENT
Start: 2022-01-01 | End: 2022-01-01

## 2022-01-01 RX ORDER — SODIUM CHLORIDE 9 MG/ML
INJECTION, SOLUTION INTRAVENOUS CONTINUOUS
Status: DISCONTINUED | OUTPATIENT
Start: 2022-01-01 | End: 2022-01-01

## 2022-01-01 RX ORDER — OXYCODONE HYDROCHLORIDE 5 MG/1
10 TABLET ORAL EVERY 4 HOURS PRN
Status: DISCONTINUED | OUTPATIENT
Start: 2022-01-01 | End: 2022-01-01

## 2022-01-01 RX ORDER — PANTOPRAZOLE SODIUM 40 MG/10ML
40 INJECTION, POWDER, LYOPHILIZED, FOR SOLUTION INTRAVENOUS DAILY
Status: DISCONTINUED | OUTPATIENT
Start: 2022-01-01 | End: 2022-01-01

## 2022-01-01 RX ORDER — GLYCOPYRROLATE 0.2 MG/ML
INJECTION INTRAMUSCULAR; INTRAVENOUS PRN
Status: DISCONTINUED | OUTPATIENT
Start: 2022-01-01 | End: 2022-01-01 | Stop reason: SDUPTHER

## 2022-01-01 RX ORDER — SODIUM CHLORIDE 0.9 % (FLUSH) 0.9 %
5-40 SYRINGE (ML) INJECTION EVERY 12 HOURS SCHEDULED
Status: DISCONTINUED | OUTPATIENT
Start: 2022-01-01 | End: 2022-01-01 | Stop reason: HOSPADM

## 2022-01-01 RX ORDER — HYDROMORPHONE HYDROCHLORIDE 1 MG/ML
0.5 INJECTION, SOLUTION INTRAMUSCULAR; INTRAVENOUS; SUBCUTANEOUS
Status: DISCONTINUED | OUTPATIENT
Start: 2022-01-01 | End: 2022-01-01

## 2022-01-01 RX ORDER — SODIUM CHLORIDE 9 MG/ML
INJECTION, SOLUTION INTRAVENOUS PRN
Status: DISCONTINUED | OUTPATIENT
Start: 2022-01-01 | End: 2022-01-01

## 2022-01-01 RX ORDER — ACETYLCYSTEINE 100 MG/ML
4 SOLUTION ORAL; RESPIRATORY (INHALATION) EVERY 4 HOURS
Status: DISCONTINUED | OUTPATIENT
Start: 2022-01-01 | End: 2022-01-01

## 2022-01-01 RX ORDER — OXYCODONE HYDROCHLORIDE AND ACETAMINOPHEN 5; 325 MG/1; MG/1
1 TABLET ORAL EVERY 6 HOURS PRN
Qty: 28 TABLET | Refills: 0 | Status: SHIPPED | OUTPATIENT
Start: 2022-01-01 | End: 2022-01-01

## 2022-01-01 RX ORDER — LIDOCAINE HYDROCHLORIDE 10 MG/ML
5 INJECTION, SOLUTION EPIDURAL; INFILTRATION; INTRACAUDAL; PERINEURAL ONCE
Status: COMPLETED | OUTPATIENT
Start: 2022-01-01 | End: 2022-01-01

## 2022-01-01 RX ORDER — ROCURONIUM BROMIDE 10 MG/ML
INJECTION, SOLUTION INTRAVENOUS PRN
Status: DISCONTINUED | OUTPATIENT
Start: 2022-01-01 | End: 2022-01-01 | Stop reason: SDUPTHER

## 2022-01-01 RX ORDER — DEXTROSE MONOHYDRATE 50 MG/ML
INJECTION, SOLUTION INTRAVENOUS CONTINUOUS
Status: DISCONTINUED | OUTPATIENT
Start: 2022-01-01 | End: 2022-01-01

## 2022-01-01 RX ORDER — LORAZEPAM 2 MG/ML
1 CONCENTRATE ORAL
Status: DISCONTINUED | OUTPATIENT
Start: 2022-01-01 | End: 2022-07-31 | Stop reason: HOSPADM

## 2022-01-01 RX ORDER — BISACODYL 10 MG
10 SUPPOSITORY, RECTAL RECTAL DAILY PRN
Status: DISCONTINUED | OUTPATIENT
Start: 2022-01-01 | End: 2022-01-01

## 2022-01-01 RX ORDER — GLYCOPYRROLATE 0.2 MG/ML
0.2 INJECTION INTRAMUSCULAR; INTRAVENOUS EVERY 4 HOURS PRN
Status: DISCONTINUED | OUTPATIENT
Start: 2022-01-01 | End: 2022-07-31 | Stop reason: HOSPADM

## 2022-01-01 RX ORDER — DEXAMETHASONE SODIUM PHOSPHATE 10 MG/ML
6 INJECTION INTRAMUSCULAR; INTRAVENOUS EVERY 24 HOURS
Status: DISCONTINUED | OUTPATIENT
Start: 2022-01-01 | End: 2022-01-01

## 2022-01-01 RX ORDER — LIDOCAINE HYDROCHLORIDE 20 MG/ML
INJECTION, SOLUTION INFILTRATION; PERINEURAL PRN
Status: DISCONTINUED | OUTPATIENT
Start: 2022-01-01 | End: 2022-01-01 | Stop reason: SDUPTHER

## 2022-01-01 RX ORDER — ACETYLCYSTEINE 100 MG/ML
4 SOLUTION ORAL; RESPIRATORY (INHALATION) 3 TIMES DAILY
Status: DISCONTINUED | OUTPATIENT
Start: 2022-01-01 | End: 2022-01-01

## 2022-01-01 RX ORDER — METOPROLOL SUCCINATE 25 MG/1
25 TABLET, EXTENDED RELEASE ORAL DAILY
Status: DISCONTINUED | OUTPATIENT
Start: 2022-01-01 | End: 2022-01-01

## 2022-01-01 RX ORDER — SODIUM CHLORIDE, SODIUM LACTATE, POTASSIUM CHLORIDE, CALCIUM CHLORIDE 600; 310; 30; 20 MG/100ML; MG/100ML; MG/100ML; MG/100ML
INJECTION, SOLUTION INTRAVENOUS CONTINUOUS PRN
Status: DISCONTINUED | OUTPATIENT
Start: 2022-01-01 | End: 2022-01-01 | Stop reason: SDUPTHER

## 2022-01-01 RX ORDER — IPRATROPIUM BROMIDE AND ALBUTEROL SULFATE 2.5; .5 MG/3ML; MG/3ML
1 SOLUTION RESPIRATORY (INHALATION) EVERY 4 HOURS
Status: DISCONTINUED | OUTPATIENT
Start: 2022-01-01 | End: 2022-01-01

## 2022-01-01 RX ORDER — DEXMEDETOMIDINE HYDROCHLORIDE 4 UG/ML
0.2 INJECTION, SOLUTION INTRAVENOUS CONTINUOUS
Status: DISCONTINUED | OUTPATIENT
Start: 2022-01-01 | End: 2022-07-31 | Stop reason: HOSPADM

## 2022-01-01 RX ORDER — HYDROMORPHONE HYDROCHLORIDE 1 MG/ML
0.25 INJECTION, SOLUTION INTRAMUSCULAR; INTRAVENOUS; SUBCUTANEOUS ONCE
Status: DISCONTINUED | OUTPATIENT
Start: 2022-01-01 | End: 2022-01-01

## 2022-01-01 RX ORDER — ENOXAPARIN SODIUM 100 MG/ML
40 INJECTION SUBCUTANEOUS DAILY
Status: DISCONTINUED | OUTPATIENT
Start: 2022-01-01 | End: 2022-01-01

## 2022-01-01 RX ORDER — EPINEPHRINE 1 MG/ML
0.3 INJECTION, SOLUTION, CONCENTRATE INTRAVENOUS
Status: ACTIVE | OUTPATIENT
Start: 2022-01-01 | End: 2022-01-01

## 2022-01-01 RX ORDER — FENTANYL CITRATE 50 UG/ML
25 INJECTION, SOLUTION INTRAMUSCULAR; INTRAVENOUS EVERY 5 MIN PRN
Status: DISCONTINUED | OUTPATIENT
Start: 2022-01-01 | End: 2022-01-01 | Stop reason: HOSPADM

## 2022-01-01 RX ORDER — ACETAMINOPHEN 325 MG/1
650 TABLET ORAL ONCE
Status: COMPLETED | OUTPATIENT
Start: 2022-01-01 | End: 2022-01-01

## 2022-01-01 RX ORDER — HEPARIN SODIUM (PORCINE) LOCK FLUSH IV SOLN 100 UNIT/ML 100 UNIT/ML
3 SOLUTION INTRAVENOUS PRN
Status: DISCONTINUED | OUTPATIENT
Start: 2022-01-01 | End: 2022-01-01

## 2022-01-01 RX ORDER — TRAMADOL HYDROCHLORIDE 50 MG/1
50 TABLET ORAL EVERY 6 HOURS PRN
Qty: 20 TABLET | Refills: 0 | Status: SHIPPED | OUTPATIENT
Start: 2022-01-01 | End: 2022-01-01

## 2022-01-01 RX ORDER — SODIUM CHLORIDE 9 MG/ML
25 INJECTION, SOLUTION INTRAVENOUS PRN
Status: DISCONTINUED | OUTPATIENT
Start: 2022-01-01 | End: 2022-01-01

## 2022-01-01 RX ORDER — HEPARIN SODIUM 5000 [USP'U]/ML
5000 INJECTION, SOLUTION INTRAVENOUS; SUBCUTANEOUS EVERY 8 HOURS SCHEDULED
Status: DISCONTINUED | OUTPATIENT
Start: 2022-01-01 | End: 2022-01-01

## 2022-01-01 RX ORDER — ENOXAPARIN SODIUM 100 MG/ML
30 INJECTION SUBCUTANEOUS DAILY
Status: DISCONTINUED | OUTPATIENT
Start: 2022-01-01 | End: 2022-01-01 | Stop reason: DRUGHIGH

## 2022-01-01 RX ORDER — ASPIRIN 81 MG/1
81 TABLET, CHEWABLE ORAL 2 TIMES DAILY WITH MEALS
Status: DISCONTINUED | OUTPATIENT
Start: 2022-01-01 | End: 2022-01-01

## 2022-01-01 RX ORDER — DOCUSATE SODIUM 100 MG/1
100 CAPSULE, LIQUID FILLED ORAL 2 TIMES DAILY
Status: DISCONTINUED | OUTPATIENT
Start: 2022-01-01 | End: 2022-01-01 | Stop reason: CLARIF

## 2022-01-01 RX ORDER — METOPROLOL TARTRATE 50 MG/1
50 TABLET, FILM COATED ORAL 2 TIMES DAILY
Status: DISCONTINUED | OUTPATIENT
Start: 2022-01-01 | End: 2022-01-01

## 2022-01-01 RX ORDER — ONDANSETRON 2 MG/ML
4 INJECTION INTRAMUSCULAR; INTRAVENOUS
Status: ACTIVE | OUTPATIENT
Start: 2022-01-01 | End: 2022-01-01

## 2022-01-01 RX ORDER — HEPARIN SODIUM (PORCINE) LOCK FLUSH IV SOLN 100 UNIT/ML 100 UNIT/ML
3 SOLUTION INTRAVENOUS EVERY 12 HOURS SCHEDULED
Status: DISCONTINUED | OUTPATIENT
Start: 2022-01-01 | End: 2022-01-01

## 2022-01-01 RX ORDER — ASPIRIN 81 MG/1
81 TABLET ORAL 2 TIMES DAILY WITH MEALS
Qty: 60 TABLET | Refills: 0 | Status: SHIPPED | OUTPATIENT
Start: 2022-01-01 | End: 2022-08-14

## 2022-01-01 RX ORDER — ENOXAPARIN SODIUM 100 MG/ML
30 INJECTION SUBCUTANEOUS DAILY
Status: DISCONTINUED | OUTPATIENT
Start: 2022-01-01 | End: 2022-01-01

## 2022-01-01 RX ORDER — ONDANSETRON 2 MG/ML
INJECTION INTRAMUSCULAR; INTRAVENOUS PRN
Status: DISCONTINUED | OUTPATIENT
Start: 2022-01-01 | End: 2022-01-01 | Stop reason: SDUPTHER

## 2022-01-01 RX ORDER — LIDOCAINE HYDROCHLORIDE 10 MG/ML
INJECTION, SOLUTION INFILTRATION; PERINEURAL PRN
Status: DISCONTINUED | OUTPATIENT
Start: 2022-01-01 | End: 2022-01-01 | Stop reason: ALTCHOICE

## 2022-01-01 RX ORDER — DIPHENHYDRAMINE HYDROCHLORIDE 50 MG/ML
12.5 INJECTION INTRAMUSCULAR; INTRAVENOUS ONCE
Status: COMPLETED | OUTPATIENT
Start: 2022-01-01 | End: 2022-01-01

## 2022-01-01 RX ORDER — METOPROLOL TARTRATE 5 MG/5ML
5 INJECTION INTRAVENOUS EVERY 6 HOURS
Status: DISCONTINUED | OUTPATIENT
Start: 2022-01-01 | End: 2022-01-01

## 2022-01-01 RX ORDER — HYDROMORPHONE HYDROCHLORIDE 1 MG/ML
0.25 INJECTION, SOLUTION INTRAMUSCULAR; INTRAVENOUS; SUBCUTANEOUS
Status: DISCONTINUED | OUTPATIENT
Start: 2022-01-01 | End: 2022-01-01

## 2022-01-01 RX ORDER — MEPERIDINE HYDROCHLORIDE 25 MG/ML
12.5 INJECTION INTRAMUSCULAR; INTRAVENOUS; SUBCUTANEOUS EVERY 5 MIN PRN
Status: DISCONTINUED | OUTPATIENT
Start: 2022-01-01 | End: 2022-01-01 | Stop reason: HOSPADM

## 2022-01-01 RX ORDER — HEPARIN SODIUM 5000 [USP'U]/ML
5000 INJECTION, SOLUTION INTRAVENOUS; SUBCUTANEOUS 2 TIMES DAILY
Status: DISCONTINUED | OUTPATIENT
Start: 2022-01-01 | End: 2022-01-01

## 2022-01-01 RX ORDER — SODIUM CHLORIDE 9 MG/ML
INJECTION, SOLUTION INTRAVENOUS PRN
Status: DISCONTINUED | OUTPATIENT
Start: 2022-01-01 | End: 2022-01-01 | Stop reason: HOSPADM

## 2022-01-01 RX ORDER — FLUCONAZOLE 2 MG/ML
400 INJECTION, SOLUTION INTRAVENOUS ONCE
Status: COMPLETED | OUTPATIENT
Start: 2022-01-01 | End: 2022-01-01

## 2022-01-01 RX ORDER — SODIUM CHLORIDE 0.9 % (FLUSH) 0.9 %
5-40 SYRINGE (ML) INJECTION EVERY 12 HOURS SCHEDULED
Status: DISCONTINUED | OUTPATIENT
Start: 2022-01-01 | End: 2022-01-01 | Stop reason: SDUPTHER

## 2022-01-01 RX ORDER — HALOPERIDOL 5 MG/ML
2 INJECTION INTRAMUSCULAR EVERY 6 HOURS PRN
Status: DISCONTINUED | OUTPATIENT
Start: 2022-01-01 | End: 2022-07-31 | Stop reason: HOSPADM

## 2022-01-01 RX ORDER — FENTANYL CITRATE 50 UG/ML
INJECTION, SOLUTION INTRAMUSCULAR; INTRAVENOUS PRN
Status: DISCONTINUED | OUTPATIENT
Start: 2022-01-01 | End: 2022-01-01 | Stop reason: SDUPTHER

## 2022-01-01 RX ORDER — ALBUMIN, HUMAN INJ 5% 5 %
SOLUTION INTRAVENOUS PRN
Status: DISCONTINUED | OUTPATIENT
Start: 2022-01-01 | End: 2022-01-01 | Stop reason: SDUPTHER

## 2022-01-01 RX ORDER — DEXTROSE MONOHYDRATE 100 MG/ML
INJECTION, SOLUTION INTRAVENOUS CONTINUOUS PRN
Status: DISCONTINUED | OUTPATIENT
Start: 2022-01-01 | End: 2022-01-01

## 2022-01-01 RX ORDER — POLYETHYLENE GLYCOL 3350 17 G/17G
17 POWDER, FOR SOLUTION ORAL 2 TIMES DAILY
Status: DISCONTINUED | OUTPATIENT
Start: 2022-01-01 | End: 2022-01-01

## 2022-01-01 RX ORDER — FLUCONAZOLE 40 MG/ML
200 POWDER, FOR SUSPENSION ORAL DAILY
Status: DISCONTINUED | OUTPATIENT
Start: 2022-01-01 | End: 2022-01-01

## 2022-01-01 RX ORDER — METOPROLOL TARTRATE 5 MG/5ML
5 INJECTION INTRAVENOUS EVERY 12 HOURS
Status: DISCONTINUED | OUTPATIENT
Start: 2022-01-01 | End: 2022-01-01

## 2022-01-01 RX ORDER — METHYLPREDNISOLONE SODIUM SUCCINATE 125 MG/2ML
125 INJECTION, POWDER, LYOPHILIZED, FOR SOLUTION INTRAMUSCULAR; INTRAVENOUS
Status: ACTIVE | OUTPATIENT
Start: 2022-01-01 | End: 2022-01-01

## 2022-01-01 RX ORDER — DEXAMETHASONE SODIUM PHOSPHATE 10 MG/ML
INJECTION, SOLUTION INTRAMUSCULAR; INTRAVENOUS PRN
Status: DISCONTINUED | OUTPATIENT
Start: 2022-01-01 | End: 2022-01-01 | Stop reason: SDUPTHER

## 2022-01-01 RX ADMIN — PANTOPRAZOLE SODIUM 40 MG: 40 INJECTION, POWDER, FOR SOLUTION INTRAVENOUS at 08:24

## 2022-01-01 RX ADMIN — Medication 10 ML: at 08:24

## 2022-01-01 RX ADMIN — VANCOMYCIN HYDROCHLORIDE 1000 MG: 1 INJECTION, POWDER, LYOPHILIZED, FOR SOLUTION INTRAVENOUS at 17:53

## 2022-01-01 RX ADMIN — Medication 10 ML: at 10:30

## 2022-01-01 RX ADMIN — ASPIRIN 81 MG CHEWABLE TABLET 81 MG: 81 TABLET CHEWABLE at 21:44

## 2022-01-01 RX ADMIN — ASPIRIN 81 MG CHEWABLE TABLET 81 MG: 81 TABLET CHEWABLE at 08:23

## 2022-01-01 RX ADMIN — MORPHINE SULFATE 2 MG: 2 INJECTION, SOLUTION INTRAMUSCULAR; INTRAVENOUS at 14:06

## 2022-01-01 RX ADMIN — SODIUM CHLORIDE 125 MG: 9 INJECTION, SOLUTION INTRAVENOUS at 15:25

## 2022-01-01 RX ADMIN — HEPARIN SODIUM 5000 UNITS: 5000 INJECTION INTRAVENOUS; SUBCUTANEOUS at 10:06

## 2022-01-01 RX ADMIN — WATER 1000 MG: 1 INJECTION INTRAMUSCULAR; INTRAVENOUS; SUBCUTANEOUS at 01:56

## 2022-01-01 RX ADMIN — PIPERACILLIN AND TAZOBACTAM 3375 MG: 3; .375 INJECTION, POWDER, LYOPHILIZED, FOR SOLUTION INTRAVENOUS at 20:21

## 2022-01-01 RX ADMIN — Medication 3 MG: at 12:44

## 2022-01-01 RX ADMIN — DEXTROSE MONOHYDRATE 100 MG: 50 INJECTION, SOLUTION INTRAVENOUS at 10:31

## 2022-01-01 RX ADMIN — IPRATROPIUM BROMIDE AND ALBUTEROL SULFATE 1 AMPULE: .5; 2.5 SOLUTION RESPIRATORY (INHALATION) at 21:37

## 2022-01-01 RX ADMIN — WATER 1000 MG: 1 INJECTION INTRAMUSCULAR; INTRAVENOUS; SUBCUTANEOUS at 13:51

## 2022-01-01 RX ADMIN — LIDOCAINE HYDROCHLORIDE 40 MG: 20 INJECTION, SOLUTION INFILTRATION; PERINEURAL at 11:30

## 2022-01-01 RX ADMIN — METOPROLOL TARTRATE 5 MG: 1 INJECTION, SOLUTION INTRAVENOUS at 10:12

## 2022-01-01 RX ADMIN — PANTOPRAZOLE SODIUM 40 MG: 40 INJECTION, POWDER, FOR SOLUTION INTRAVENOUS at 13:11

## 2022-01-01 RX ADMIN — METOPROLOL TARTRATE 50 MG: 50 TABLET, FILM COATED ORAL at 10:39

## 2022-01-01 RX ADMIN — Medication 10 ML: at 00:25

## 2022-01-01 RX ADMIN — SODIUM CHLORIDE 500 ML: 9 INJECTION, SOLUTION INTRAVENOUS at 19:15

## 2022-01-01 RX ADMIN — Medication 10 ML: at 10:13

## 2022-01-01 RX ADMIN — DEXTROSE AND SODIUM CHLORIDE: 5; 900 INJECTION, SOLUTION INTRAVENOUS at 09:43

## 2022-01-01 RX ADMIN — ACETYLCYSTEINE 400 MG: 100 SOLUTION ORAL; RESPIRATORY (INHALATION) at 06:42

## 2022-01-01 RX ADMIN — SODIUM CHLORIDE, PRESERVATIVE FREE 10 ML: 5 INJECTION INTRAVENOUS at 12:43

## 2022-01-01 RX ADMIN — OXYCODONE 5 MG: 5 TABLET ORAL at 08:24

## 2022-01-01 RX ADMIN — FENTANYL CITRATE 50 MCG: 50 INJECTION, SOLUTION INTRAMUSCULAR; INTRAVENOUS at 12:10

## 2022-01-01 RX ADMIN — HEPARIN SODIUM (PORCINE) LOCK FLUSH IV SOLN 100 UNIT/ML 300 UNITS: 100 SOLUTION at 20:11

## 2022-01-01 RX ADMIN — TOCILIZUMAB 400 MG: 20 INJECTION, SOLUTION, CONCENTRATE INTRAVENOUS at 23:55

## 2022-01-01 RX ADMIN — SODIUM CHLORIDE: 9 INJECTION, SOLUTION INTRAVENOUS at 17:05

## 2022-01-01 RX ADMIN — SODIUM CHLORIDE: 9 INJECTION, SOLUTION INTRAVENOUS at 09:44

## 2022-01-01 RX ADMIN — VANCOMYCIN HYDROCHLORIDE 1000 MG: 1 INJECTION, POWDER, LYOPHILIZED, FOR SOLUTION INTRAVENOUS at 13:48

## 2022-01-01 RX ADMIN — ACETYLCYSTEINE 400 MG: 100 SOLUTION ORAL; RESPIRATORY (INHALATION) at 10:04

## 2022-01-01 RX ADMIN — POLYETHYLENE GLYCOL 3350 17 G: 17 POWDER, FOR SOLUTION ORAL at 11:22

## 2022-01-01 RX ADMIN — DOCUSATE SODIUM LIQUID 100 MG: 100 LIQUID ORAL at 10:09

## 2022-01-01 RX ADMIN — METOPROLOL TARTRATE 5 MG: 1 INJECTION, SOLUTION INTRAVENOUS at 02:52

## 2022-01-01 RX ADMIN — Medication 10 ML: at 20:50

## 2022-01-01 RX ADMIN — PANTOPRAZOLE SODIUM 40 MG: 40 INJECTION, POWDER, FOR SOLUTION INTRAVENOUS at 11:21

## 2022-01-01 RX ADMIN — SODIUM CHLORIDE: 9 INJECTION, SOLUTION INTRAVENOUS at 21:43

## 2022-01-01 RX ADMIN — SODIUM CHLORIDE: 9 INJECTION, SOLUTION INTRAVENOUS at 04:38

## 2022-01-01 RX ADMIN — METOPROLOL SUCCINATE 25 MG: 25 TABLET, EXTENDED RELEASE ORAL at 09:38

## 2022-01-01 RX ADMIN — PHENYLEPHRINE HYDROCHLORIDE 100 MCG: 10 INJECTION INTRAVENOUS at 12:24

## 2022-01-01 RX ADMIN — DEXAMETHASONE SODIUM PHOSPHATE 6 MG: 10 INJECTION INTRAMUSCULAR; INTRAVENOUS at 19:10

## 2022-01-01 RX ADMIN — ASPIRIN 81 MG CHEWABLE TABLET 81 MG: 81 TABLET CHEWABLE at 09:52

## 2022-01-01 RX ADMIN — Medication 0.2 MCG/KG/HR: at 18:20

## 2022-01-01 RX ADMIN — FLUCONAZOLE IN SODIUM CHLORIDE 200 MG: 2 INJECTION, SOLUTION INTRAVENOUS at 21:53

## 2022-01-01 RX ADMIN — ACETYLCYSTEINE 400 MG: 100 SOLUTION ORAL; RESPIRATORY (INHALATION) at 09:37

## 2022-01-01 RX ADMIN — PANTOPRAZOLE SODIUM 40 MG: 40 INJECTION, POWDER, FOR SOLUTION INTRAVENOUS at 10:06

## 2022-01-01 RX ADMIN — METOPROLOL TARTRATE 50 MG: 50 TABLET, FILM COATED ORAL at 11:22

## 2022-01-01 RX ADMIN — BARIUM SULFATE 45 G: 0.81 POWDER, FOR SUSPENSION ORAL at 11:40

## 2022-01-01 RX ADMIN — PIPERACILLIN AND TAZOBACTAM 3375 MG: 3; .375 INJECTION, POWDER, FOR SOLUTION INTRAVENOUS at 18:14

## 2022-01-01 RX ADMIN — SODIUM CHLORIDE 1000 ML: 9 INJECTION, SOLUTION INTRAVENOUS at 21:09

## 2022-01-01 RX ADMIN — SODIUM CHLORIDE, PRESERVATIVE FREE 10 ML: 5 INJECTION INTRAVENOUS at 08:19

## 2022-01-01 RX ADMIN — WATER 1000 MG: 1 INJECTION INTRAMUSCULAR; INTRAVENOUS; SUBCUTANEOUS at 13:50

## 2022-01-01 RX ADMIN — WATER 1000 MG: 1 INJECTION INTRAMUSCULAR; INTRAVENOUS; SUBCUTANEOUS at 14:03

## 2022-01-01 RX ADMIN — ACETYLCYSTEINE 400 MG: 100 SOLUTION ORAL; RESPIRATORY (INHALATION) at 09:47

## 2022-01-01 RX ADMIN — DEXTROSE MONOHYDRATE 100 MG: 50 INJECTION, SOLUTION INTRAVENOUS at 11:40

## 2022-01-01 RX ADMIN — SODIUM CHLORIDE, PRESERVATIVE FREE 10 ML: 5 INJECTION INTRAVENOUS at 08:44

## 2022-01-01 RX ADMIN — METOPROLOL TARTRATE 50 MG: 50 TABLET, FILM COATED ORAL at 08:56

## 2022-01-01 RX ADMIN — SODIUM CHLORIDE, PRESERVATIVE FREE 10 ML: 5 INJECTION INTRAVENOUS at 13:07

## 2022-01-01 RX ADMIN — OXYCODONE 5 MG: 5 TABLET ORAL at 21:52

## 2022-01-01 RX ADMIN — Medication 10 ML: at 10:46

## 2022-01-01 RX ADMIN — DEXAMETHASONE SODIUM PHOSPHATE 6 MG: 10 INJECTION INTRAMUSCULAR; INTRAVENOUS at 18:47

## 2022-01-01 RX ADMIN — HYDROMORPHONE HYDROCHLORIDE 0.25 MG: 1 INJECTION, SOLUTION INTRAMUSCULAR; INTRAVENOUS; SUBCUTANEOUS at 10:04

## 2022-01-01 RX ADMIN — DOCUSATE SODIUM LIQUID 100 MG: 100 LIQUID ORAL at 13:43

## 2022-01-01 RX ADMIN — METOPROLOL TARTRATE 5 MG: 1 INJECTION, SOLUTION INTRAVENOUS at 12:40

## 2022-01-01 RX ADMIN — SODIUM CHLORIDE, POTASSIUM CHLORIDE, SODIUM LACTATE AND CALCIUM CHLORIDE: 600; 310; 30; 20 INJECTION, SOLUTION INTRAVENOUS at 11:21

## 2022-01-01 RX ADMIN — ENOXAPARIN SODIUM 30 MG: 100 INJECTION SUBCUTANEOUS at 10:05

## 2022-01-01 RX ADMIN — PANTOPRAZOLE SODIUM 40 MG: 40 INJECTION, POWDER, FOR SOLUTION INTRAVENOUS at 08:16

## 2022-01-01 RX ADMIN — ASPIRIN 81 MG CHEWABLE TABLET 81 MG: 81 TABLET CHEWABLE at 18:07

## 2022-01-01 RX ADMIN — METOPROLOL TARTRATE 5 MG: 1 INJECTION, SOLUTION INTRAVENOUS at 10:20

## 2022-01-01 RX ADMIN — ASPIRIN 81 MG CHEWABLE TABLET 81 MG: 81 TABLET CHEWABLE at 17:10

## 2022-01-01 RX ADMIN — CALCIUM GLUCONATE 1000 MG: 98 INJECTION, SOLUTION INTRAVENOUS at 12:27

## 2022-01-01 RX ADMIN — IPRATROPIUM BROMIDE AND ALBUTEROL SULFATE 1 AMPULE: .5; 2.5 SOLUTION RESPIRATORY (INHALATION) at 13:35

## 2022-01-01 RX ADMIN — HALOPERIDOL LACTATE 2 MG: 5 INJECTION, SOLUTION INTRAMUSCULAR at 02:13

## 2022-01-01 RX ADMIN — IPRATROPIUM BROMIDE AND ALBUTEROL SULFATE 1 AMPULE: .5; 2.5 SOLUTION RESPIRATORY (INHALATION) at 02:20

## 2022-01-01 RX ADMIN — DOCUSATE SODIUM LIQUID 100 MG: 100 LIQUID ORAL at 21:27

## 2022-01-01 RX ADMIN — VANCOMYCIN HYDROCHLORIDE 1000 MG: 1 INJECTION, POWDER, LYOPHILIZED, FOR SOLUTION INTRAVENOUS at 14:20

## 2022-01-01 RX ADMIN — METOPROLOL TARTRATE 50 MG: 50 TABLET, FILM COATED ORAL at 21:44

## 2022-01-01 RX ADMIN — DEXTROSE AND SODIUM CHLORIDE: 5; 900 INJECTION, SOLUTION INTRAVENOUS at 06:01

## 2022-01-01 RX ADMIN — FLUCONAZOLE 400 MG: 400 INJECTION, SOLUTION INTRAVENOUS at 13:57

## 2022-01-01 RX ADMIN — INSULIN LISPRO 1 UNITS: 100 INJECTION, SOLUTION INTRAVENOUS; SUBCUTANEOUS at 21:06

## 2022-01-01 RX ADMIN — METOPROLOL TARTRATE 50 MG: 50 TABLET, FILM COATED ORAL at 13:43

## 2022-01-01 RX ADMIN — METOPROLOL TARTRATE 50 MG: 50 TABLET, FILM COATED ORAL at 09:56

## 2022-01-01 RX ADMIN — ACETYLCYSTEINE 400 MG: 100 SOLUTION ORAL; RESPIRATORY (INHALATION) at 05:28

## 2022-01-01 RX ADMIN — WATER 1000 MG: 1 INJECTION INTRAMUSCULAR; INTRAVENOUS; SUBCUTANEOUS at 12:38

## 2022-01-01 RX ADMIN — SODIUM CHLORIDE, PRESERVATIVE FREE 10 ML: 5 INJECTION INTRAVENOUS at 17:38

## 2022-01-01 RX ADMIN — HEPARIN SODIUM 5000 UNITS: 5000 INJECTION INTRAVENOUS; SUBCUTANEOUS at 08:45

## 2022-01-01 RX ADMIN — ACETYLCYSTEINE 400 MG: 100 SOLUTION ORAL; RESPIRATORY (INHALATION) at 09:21

## 2022-01-01 RX ADMIN — POLYETHYLENE GLYCOL 3350 17 G: 17 POWDER, FOR SOLUTION ORAL at 10:05

## 2022-01-01 RX ADMIN — ETOMIDATE 10 MG: 2 INJECTION, SOLUTION INTRAVENOUS at 11:30

## 2022-01-01 RX ADMIN — Medication 10 ML: at 21:45

## 2022-01-01 RX ADMIN — SODIUM CHLORIDE, POTASSIUM CHLORIDE, SODIUM LACTATE AND CALCIUM CHLORIDE: 600; 310; 30; 20 INJECTION, SOLUTION INTRAVENOUS at 12:33

## 2022-01-01 RX ADMIN — IPRATROPIUM BROMIDE AND ALBUTEROL SULFATE 1 AMPULE: .5; 2.5 SOLUTION RESPIRATORY (INHALATION) at 09:46

## 2022-01-01 RX ADMIN — ACETYLCYSTEINE 400 MG: 100 SOLUTION ORAL; RESPIRATORY (INHALATION) at 14:47

## 2022-01-01 RX ADMIN — OXYCODONE 5 MG: 5 TABLET ORAL at 10:45

## 2022-01-01 RX ADMIN — HEPARIN SODIUM (PORCINE) LOCK FLUSH IV SOLN 100 UNIT/ML 100 UNITS: 100 SOLUTION at 10:17

## 2022-01-01 RX ADMIN — PIPERACILLIN AND TAZOBACTAM 3375 MG: 3; .375 INJECTION, POWDER, FOR SOLUTION INTRAVENOUS at 18:41

## 2022-01-01 RX ADMIN — HALOPERIDOL LACTATE 2 MG: 5 INJECTION, SOLUTION INTRAMUSCULAR at 11:29

## 2022-01-01 RX ADMIN — SODIUM CHLORIDE 125 MG: 9 INJECTION, SOLUTION INTRAVENOUS at 11:05

## 2022-01-01 RX ADMIN — Medication 10 ML: at 21:29

## 2022-01-01 RX ADMIN — ASPIRIN 81 MG CHEWABLE TABLET 81 MG: 81 TABLET CHEWABLE at 17:35

## 2022-01-01 RX ADMIN — IPRATROPIUM BROMIDE AND ALBUTEROL SULFATE 1 AMPULE: .5; 2.5 SOLUTION RESPIRATORY (INHALATION) at 08:39

## 2022-01-01 RX ADMIN — METOPROLOL TARTRATE 50 MG: 50 TABLET, FILM COATED ORAL at 15:46

## 2022-01-01 RX ADMIN — PANTOPRAZOLE SODIUM 40 MG: 40 INJECTION, POWDER, FOR SOLUTION INTRAVENOUS at 11:35

## 2022-01-01 RX ADMIN — WATER 1000 MG: 1 INJECTION INTRAMUSCULAR; INTRAVENOUS; SUBCUTANEOUS at 13:11

## 2022-01-01 RX ADMIN — PIPERACILLIN AND TAZOBACTAM 3375 MG: 3; .375 INJECTION, POWDER, LYOPHILIZED, FOR SOLUTION INTRAVENOUS at 20:13

## 2022-01-01 RX ADMIN — ASPIRIN 81 MG CHEWABLE TABLET 81 MG: 81 TABLET CHEWABLE at 11:35

## 2022-01-01 RX ADMIN — WATER 1000 MG: 1 INJECTION INTRAMUSCULAR; INTRAVENOUS; SUBCUTANEOUS at 13:02

## 2022-01-01 RX ADMIN — PIPERACILLIN AND TAZOBACTAM 3375 MG: 3; .375 INJECTION, POWDER, LYOPHILIZED, FOR SOLUTION INTRAVENOUS at 14:19

## 2022-01-01 RX ADMIN — WATER 1000 MG: 1 INJECTION INTRAMUSCULAR; INTRAVENOUS; SUBCUTANEOUS at 18:52

## 2022-01-01 RX ADMIN — OXYCODONE 10 MG: 5 TABLET ORAL at 07:11

## 2022-01-01 RX ADMIN — ASPIRIN 81 MG CHEWABLE TABLET 81 MG: 81 TABLET CHEWABLE at 17:00

## 2022-01-01 RX ADMIN — IPRATROPIUM BROMIDE AND ALBUTEROL SULFATE 1 AMPULE: .5; 2.5 SOLUTION RESPIRATORY (INHALATION) at 16:49

## 2022-01-01 RX ADMIN — OXYCODONE 5 MG: 5 TABLET ORAL at 09:56

## 2022-01-01 RX ADMIN — Medication 10 ML: at 21:53

## 2022-01-01 RX ADMIN — IPRATROPIUM BROMIDE AND ALBUTEROL SULFATE 1 AMPULE: .5; 2.5 SOLUTION RESPIRATORY (INHALATION) at 14:46

## 2022-01-01 RX ADMIN — ACETYLCYSTEINE 400 MG: 100 SOLUTION ORAL; RESPIRATORY (INHALATION) at 17:46

## 2022-01-01 RX ADMIN — Medication 250 MG: at 00:16

## 2022-01-01 RX ADMIN — SODIUM CHLORIDE, PRESERVATIVE FREE 10 ML: 5 INJECTION INTRAVENOUS at 20:11

## 2022-01-01 RX ADMIN — SODIUM CHLORIDE 1000 ML: 9 INJECTION, SOLUTION INTRAVENOUS at 02:23

## 2022-01-01 RX ADMIN — OXYCODONE 5 MG: 5 TABLET ORAL at 13:49

## 2022-01-01 RX ADMIN — Medication 10 ML: at 20:09

## 2022-01-01 RX ADMIN — ASPIRIN 81 MG CHEWABLE TABLET 81 MG: 81 TABLET CHEWABLE at 08:56

## 2022-01-01 RX ADMIN — AMPICILLIN SODIUM AND SULBACTAM SODIUM 3000 MG: 2; 1 INJECTION, POWDER, FOR SOLUTION INTRAMUSCULAR; INTRAVENOUS at 05:58

## 2022-01-01 RX ADMIN — MORPHINE SULFATE 2 MG: 2 INJECTION, SOLUTION INTRAMUSCULAR; INTRAVENOUS at 06:47

## 2022-01-01 RX ADMIN — PHENYLEPHRINE HYDROCHLORIDE 200 MCG: 10 INJECTION INTRAVENOUS at 11:59

## 2022-01-01 RX ADMIN — PIPERACILLIN AND TAZOBACTAM 3375 MG: 3; .375 INJECTION, POWDER, LYOPHILIZED, FOR SOLUTION INTRAVENOUS at 14:08

## 2022-01-01 RX ADMIN — CEFAZOLIN 1000 MG: 1 INJECTION, POWDER, FOR SOLUTION INTRAMUSCULAR; INTRAVENOUS at 11:32

## 2022-01-01 RX ADMIN — DEXTROSE MONOHYDRATE 100 MG: 50 INJECTION, SOLUTION INTRAVENOUS at 11:16

## 2022-01-01 RX ADMIN — PHENYLEPHRINE HYDROCHLORIDE 400 MCG: 10 INJECTION INTRAVENOUS at 11:52

## 2022-01-01 RX ADMIN — OXYCODONE 5 MG: 5 TABLET ORAL at 20:42

## 2022-01-01 RX ADMIN — HYDROMORPHONE HYDROCHLORIDE 0.5 MG: 1 INJECTION, SOLUTION INTRAMUSCULAR; INTRAVENOUS; SUBCUTANEOUS at 17:41

## 2022-01-01 RX ADMIN — PROPOFOL 25 MG: 10 INJECTION, EMULSION INTRAVENOUS at 16:16

## 2022-01-01 RX ADMIN — Medication 0.2 MCG/KG/HR: at 17:43

## 2022-01-01 RX ADMIN — HEPARIN SODIUM (PORCINE) LOCK FLUSH IV SOLN 100 UNIT/ML 300 UNITS: 100 SOLUTION at 08:46

## 2022-01-01 RX ADMIN — VANCOMYCIN HYDROCHLORIDE 1000 MG: 1 INJECTION, POWDER, LYOPHILIZED, FOR SOLUTION INTRAVENOUS at 15:24

## 2022-01-01 RX ADMIN — WATER 1000 MG: 1 INJECTION INTRAMUSCULAR; INTRAVENOUS; SUBCUTANEOUS at 13:03

## 2022-01-01 RX ADMIN — Medication 10 ML: at 08:57

## 2022-01-01 RX ADMIN — WATER 1000 MG: 1 INJECTION INTRAMUSCULAR; INTRAVENOUS; SUBCUTANEOUS at 15:53

## 2022-01-01 RX ADMIN — METOPROLOL TARTRATE 25 MG: 25 TABLET, FILM COATED ORAL at 20:16

## 2022-01-01 RX ADMIN — IPRATROPIUM BROMIDE AND ALBUTEROL SULFATE 1 AMPULE: .5; 2.5 SOLUTION RESPIRATORY (INHALATION) at 22:16

## 2022-01-01 RX ADMIN — IPRATROPIUM BROMIDE AND ALBUTEROL SULFATE 1 AMPULE: .5; 2.5 SOLUTION RESPIRATORY (INHALATION) at 06:42

## 2022-01-01 RX ADMIN — OXYCODONE 5 MG: 5 TABLET ORAL at 21:44

## 2022-01-01 RX ADMIN — METOPROLOL TARTRATE 50 MG: 50 TABLET, FILM COATED ORAL at 08:44

## 2022-01-01 RX ADMIN — SODIUM CHLORIDE: 9 INJECTION, SOLUTION INTRAVENOUS at 10:11

## 2022-01-01 RX ADMIN — ASPIRIN 81 MG CHEWABLE TABLET 81 MG: 81 TABLET CHEWABLE at 10:05

## 2022-01-01 RX ADMIN — ROCURONIUM BROMIDE 10 MG: 10 INJECTION, SOLUTION INTRAVENOUS at 12:10

## 2022-01-01 RX ADMIN — FLUCONAZOLE 200 MG: 40 POWDER, FOR SUSPENSION ORAL at 00:20

## 2022-01-01 RX ADMIN — ASPIRIN 81 MG CHEWABLE TABLET 81 MG: 81 TABLET CHEWABLE at 08:44

## 2022-01-01 RX ADMIN — METOPROLOL TARTRATE 50 MG: 50 TABLET, FILM COATED ORAL at 14:03

## 2022-01-01 RX ADMIN — DEXAMETHASONE SODIUM PHOSPHATE 6 MG: 10 INJECTION INTRAMUSCULAR; INTRAVENOUS at 17:37

## 2022-01-01 RX ADMIN — PANTOPRAZOLE SODIUM 40 MG: 40 INJECTION, POWDER, FOR SOLUTION INTRAVENOUS at 10:29

## 2022-01-01 RX ADMIN — ASPIRIN 81 MG CHEWABLE TABLET 81 MG: 81 TABLET CHEWABLE at 00:16

## 2022-01-01 RX ADMIN — INSULIN LISPRO 1 UNITS: 100 INJECTION, SOLUTION INTRAVENOUS; SUBCUTANEOUS at 10:07

## 2022-01-01 RX ADMIN — ALBUMIN (HUMAN) 12.5 G: 12.5 INJECTION, SOLUTION INTRAVENOUS at 12:08

## 2022-01-01 RX ADMIN — POLYETHYLENE GLYCOL 3350 17 G: 17 POWDER, FOR SOLUTION ORAL at 21:30

## 2022-01-01 RX ADMIN — POLYETHYLENE GLYCOL 3350 17 G: 17 POWDER, FOR SOLUTION ORAL at 20:09

## 2022-01-01 RX ADMIN — PIPERACILLIN AND TAZOBACTAM 3375 MG: 3; .375 INJECTION, POWDER, FOR SOLUTION INTRAVENOUS at 06:04

## 2022-01-01 RX ADMIN — MORPHINE SULFATE 2 MG: 2 INJECTION, SOLUTION INTRAMUSCULAR; INTRAVENOUS at 00:16

## 2022-01-01 RX ADMIN — METOPROLOL TARTRATE 25 MG: 25 TABLET, FILM COATED ORAL at 12:41

## 2022-01-01 RX ADMIN — Medication 250 MG: at 05:24

## 2022-01-01 RX ADMIN — FLUCONAZOLE IN SODIUM CHLORIDE 200 MG: 2 INJECTION, SOLUTION INTRAVENOUS at 21:41

## 2022-01-01 RX ADMIN — IPRATROPIUM BROMIDE AND ALBUTEROL SULFATE 1 AMPULE: .5; 2.5 SOLUTION RESPIRATORY (INHALATION) at 05:17

## 2022-01-01 RX ADMIN — LIDOCAINE HYDROCHLORIDE 5 ML: 10 INJECTION, SOLUTION EPIDURAL; INFILTRATION; INTRACAUDAL at 11:09

## 2022-01-01 RX ADMIN — PHENYLEPHRINE HYDROCHLORIDE 200 MCG: 10 INJECTION INTRAVENOUS at 12:15

## 2022-01-01 RX ADMIN — METOPROLOL TARTRATE 5 MG: 1 INJECTION, SOLUTION INTRAVENOUS at 02:18

## 2022-01-01 RX ADMIN — METOPROLOL TARTRATE 50 MG: 50 TABLET, FILM COATED ORAL at 08:23

## 2022-01-01 RX ADMIN — METOPROLOL TARTRATE 50 MG: 50 TABLET, FILM COATED ORAL at 20:08

## 2022-01-01 RX ADMIN — MORPHINE SULFATE 2 MG: 2 INJECTION, SOLUTION INTRAMUSCULAR; INTRAVENOUS at 22:35

## 2022-01-01 RX ADMIN — POLYETHYLENE GLYCOL 3350 17 G: 17 POWDER, FOR SOLUTION ORAL at 08:24

## 2022-01-01 RX ADMIN — DEXAMETHASONE SODIUM PHOSPHATE 6 MG: 10 INJECTION INTRAMUSCULAR; INTRAVENOUS at 18:08

## 2022-01-01 RX ADMIN — PIPERACILLIN AND TAZOBACTAM 3375 MG: 3; .375 INJECTION, POWDER, LYOPHILIZED, FOR SOLUTION INTRAVENOUS at 04:56

## 2022-01-01 RX ADMIN — METOPROLOL TARTRATE 50 MG: 50 TABLET, FILM COATED ORAL at 09:52

## 2022-01-01 RX ADMIN — IPRATROPIUM BROMIDE AND ALBUTEROL SULFATE 1 AMPULE: .5; 2.5 SOLUTION RESPIRATORY (INHALATION) at 02:21

## 2022-01-01 RX ADMIN — PIPERACILLIN AND TAZOBACTAM 3375 MG: 3; .375 INJECTION, POWDER, FOR SOLUTION INTRAVENOUS at 17:41

## 2022-01-01 RX ADMIN — PANTOPRAZOLE SODIUM 40 MG: 40 INJECTION, POWDER, FOR SOLUTION INTRAVENOUS at 09:58

## 2022-01-01 RX ADMIN — DEXAMETHASONE SODIUM PHOSPHATE 6 MG: 10 INJECTION INTRAMUSCULAR; INTRAVENOUS at 17:00

## 2022-01-01 RX ADMIN — METOPROLOL TARTRATE 5 MG: 1 INJECTION, SOLUTION INTRAVENOUS at 20:38

## 2022-01-01 RX ADMIN — IPRATROPIUM BROMIDE AND ALBUTEROL SULFATE 1 AMPULE: .5; 2.5 SOLUTION RESPIRATORY (INHALATION) at 17:46

## 2022-01-01 RX ADMIN — IPRATROPIUM BROMIDE AND ALBUTEROL SULFATE 1 AMPULE: .5; 2.5 SOLUTION RESPIRATORY (INHALATION) at 10:04

## 2022-01-01 RX ADMIN — ENOXAPARIN SODIUM 30 MG: 100 INJECTION SUBCUTANEOUS at 21:53

## 2022-01-01 RX ADMIN — PHENYLEPHRINE HYDROCHLORIDE 200 MCG: 10 INJECTION INTRAVENOUS at 11:43

## 2022-01-01 RX ADMIN — ROCURONIUM BROMIDE 20 MG: 10 INJECTION, SOLUTION INTRAVENOUS at 11:35

## 2022-01-01 RX ADMIN — IPRATROPIUM BROMIDE AND ALBUTEROL SULFATE 1 AMPULE: .5; 2.5 SOLUTION RESPIRATORY (INHALATION) at 05:28

## 2022-01-01 RX ADMIN — BARIUM SULFATE 45 ML: 400 PASTE ORAL at 11:40

## 2022-01-01 RX ADMIN — DIPHENHYDRAMINE HYDROCHLORIDE 12.5 MG: 50 INJECTION, SOLUTION INTRAMUSCULAR; INTRAVENOUS at 22:31

## 2022-01-01 RX ADMIN — DEXTROSE AND SODIUM CHLORIDE: 5; 900 INJECTION, SOLUTION INTRAVENOUS at 14:44

## 2022-01-01 RX ADMIN — PIPERACILLIN AND TAZOBACTAM 3375 MG: 3; .375 INJECTION, POWDER, LYOPHILIZED, FOR SOLUTION INTRAVENOUS at 14:03

## 2022-01-01 RX ADMIN — IPRATROPIUM BROMIDE AND ALBUTEROL SULFATE 1 AMPULE: .5; 2.5 SOLUTION RESPIRATORY (INHALATION) at 09:20

## 2022-01-01 RX ADMIN — HEPARIN SODIUM (PORCINE) LOCK FLUSH IV SOLN 100 UNIT/ML 300 UNITS: 100 SOLUTION at 20:15

## 2022-01-01 RX ADMIN — PANTOPRAZOLE SODIUM 40 MG: 40 INJECTION, POWDER, FOR SOLUTION INTRAVENOUS at 10:45

## 2022-01-01 RX ADMIN — DEXTROSE MONOHYDRATE: 50 INJECTION, SOLUTION INTRAVENOUS at 18:05

## 2022-01-01 RX ADMIN — MEPERIDINE HYDROCHLORIDE 12.5 MG: 25 INJECTION INTRAMUSCULAR; INTRAVENOUS; SUBCUTANEOUS at 13:29

## 2022-01-01 RX ADMIN — PIPERACILLIN AND TAZOBACTAM 3375 MG: 3; .375 INJECTION, POWDER, FOR SOLUTION INTRAVENOUS at 05:24

## 2022-01-01 RX ADMIN — SODIUM CHLORIDE, PRESERVATIVE FREE 10 ML: 5 INJECTION INTRAVENOUS at 20:15

## 2022-01-01 RX ADMIN — ACETAMINOPHEN 650 MG: 325 TABLET ORAL at 22:30

## 2022-01-01 RX ADMIN — METOPROLOL TARTRATE 50 MG: 50 TABLET, FILM COATED ORAL at 11:35

## 2022-01-01 RX ADMIN — SODIUM CHLORIDE, PRESERVATIVE FREE 10 ML: 5 INJECTION INTRAVENOUS at 19:51

## 2022-01-01 RX ADMIN — HEPARIN SODIUM 5000 UNITS: 5000 INJECTION INTRAVENOUS; SUBCUTANEOUS at 20:16

## 2022-01-01 RX ADMIN — ENOXAPARIN SODIUM 30 MG: 100 INJECTION SUBCUTANEOUS at 12:38

## 2022-01-01 RX ADMIN — METOPROLOL TARTRATE 50 MG: 50 TABLET, FILM COATED ORAL at 21:28

## 2022-01-01 RX ADMIN — SODIUM CHLORIDE 125 MG: 9 INJECTION, SOLUTION INTRAVENOUS at 14:50

## 2022-01-01 RX ADMIN — SODIUM CHLORIDE: 9 INJECTION, SOLUTION INTRAVENOUS at 05:21

## 2022-01-01 RX ADMIN — METOPROLOL TARTRATE 5 MG: 1 INJECTION, SOLUTION INTRAVENOUS at 06:30

## 2022-01-01 RX ADMIN — DEXTROSE MONOHYDRATE: 50 INJECTION, SOLUTION INTRAVENOUS at 22:27

## 2022-01-01 RX ADMIN — MORPHINE SULFATE 2 MG: 2 INJECTION, SOLUTION INTRAMUSCULAR; INTRAVENOUS at 09:39

## 2022-01-01 RX ADMIN — HYDROMORPHONE HYDROCHLORIDE 0.25 MG: 1 INJECTION, SOLUTION INTRAMUSCULAR; INTRAVENOUS; SUBCUTANEOUS at 14:02

## 2022-01-01 RX ADMIN — Medication 10 ML: at 10:08

## 2022-01-01 RX ADMIN — DILTIAZEM HYDROCHLORIDE 5 MG/HR: 5 INJECTION, SOLUTION INTRAVENOUS at 22:11

## 2022-01-01 RX ADMIN — GLYCOPYRROLATE 0.6 MG: 0.2 INJECTION INTRAMUSCULAR; INTRAVENOUS at 12:44

## 2022-01-01 RX ADMIN — PIPERACILLIN AND TAZOBACTAM 3375 MG: 3; .375 INJECTION, POWDER, LYOPHILIZED, FOR SOLUTION INTRAVENOUS at 04:42

## 2022-01-01 RX ADMIN — PHENYLEPHRINE HYDROCHLORIDE 100 MCG: 10 INJECTION INTRAVENOUS at 12:18

## 2022-01-01 RX ADMIN — METOPROLOL TARTRATE 50 MG: 50 TABLET, FILM COATED ORAL at 18:08

## 2022-01-01 RX ADMIN — FENTANYL CITRATE 12.5 MCG: 50 INJECTION, SOLUTION INTRAMUSCULAR; INTRAVENOUS at 17:55

## 2022-01-01 RX ADMIN — DEXTROSE MONOHYDRATE 200 MG: 50 INJECTION, SOLUTION INTRAVENOUS at 15:09

## 2022-01-01 RX ADMIN — Medication 250 MG: at 12:09

## 2022-01-01 RX ADMIN — ASPIRIN 81 MG CHEWABLE TABLET 81 MG: 81 TABLET CHEWABLE at 10:39

## 2022-01-01 RX ADMIN — PIPERACILLIN AND TAZOBACTAM 3375 MG: 3; .375 INJECTION, POWDER, FOR SOLUTION INTRAVENOUS at 05:42

## 2022-01-01 RX ADMIN — BARIUM SULFATE 45 ML: 400 SUSPENSION ORAL at 11:40

## 2022-01-01 RX ADMIN — HEPARIN SODIUM 5000 UNITS: 5000 INJECTION INTRAVENOUS; SUBCUTANEOUS at 20:30

## 2022-01-01 RX ADMIN — FENTANYL CITRATE 50 MCG: 50 INJECTION, SOLUTION INTRAMUSCULAR; INTRAVENOUS at 11:28

## 2022-01-01 RX ADMIN — DEXAMETHASONE SODIUM PHOSPHATE 6 MG: 10 INJECTION INTRAMUSCULAR; INTRAVENOUS at 17:42

## 2022-01-01 RX ADMIN — ASPIRIN 81 MG CHEWABLE TABLET 81 MG: 81 TABLET CHEWABLE at 18:03

## 2022-01-01 RX ADMIN — DEXAMETHASONE SODIUM PHOSPHATE 6 MG: 10 INJECTION INTRAMUSCULAR; INTRAVENOUS at 17:10

## 2022-01-01 RX ADMIN — AMPICILLIN SODIUM AND SULBACTAM SODIUM 3000 MG: 2; 1 INJECTION, POWDER, FOR SOLUTION INTRAMUSCULAR; INTRAVENOUS at 23:08

## 2022-01-01 RX ADMIN — METOPROLOL TARTRATE 50 MG: 50 TABLET, FILM COATED ORAL at 20:42

## 2022-01-01 RX ADMIN — PHENYLEPHRINE HYDROCHLORIDE 100 MCG: 10 INJECTION INTRAVENOUS at 12:44

## 2022-01-01 RX ADMIN — PROPOFOL 25 MG: 10 INJECTION, EMULSION INTRAVENOUS at 16:05

## 2022-01-01 RX ADMIN — INSULIN LISPRO 1 UNITS: 100 INJECTION, SOLUTION INTRAVENOUS; SUBCUTANEOUS at 15:59

## 2022-01-01 RX ADMIN — ENOXAPARIN SODIUM 30 MG: 100 INJECTION SUBCUTANEOUS at 20:15

## 2022-01-01 RX ADMIN — METOPROLOL TARTRATE 50 MG: 50 TABLET, FILM COATED ORAL at 20:23

## 2022-01-01 RX ADMIN — ACETYLCYSTEINE 400 MG: 100 SOLUTION ORAL; RESPIRATORY (INHALATION) at 16:49

## 2022-01-01 RX ADMIN — OXYCODONE 5 MG: 5 TABLET ORAL at 13:43

## 2022-01-01 RX ADMIN — Medication 250 MG: at 08:44

## 2022-01-01 RX ADMIN — MEPERIDINE HYDROCHLORIDE 12.5 MG: 25 INJECTION, SOLUTION INTRAMUSCULAR; INTRAVENOUS; SUBCUTANEOUS at 13:29

## 2022-01-01 RX ADMIN — MAGNESIUM CITRATE 296 ML: 1.75 LIQUID ORAL at 13:49

## 2022-01-01 RX ADMIN — ONDANSETRON 4 MG: 2 INJECTION INTRAMUSCULAR; INTRAVENOUS at 12:36

## 2022-01-01 RX ADMIN — PHENYLEPHRINE HYDROCHLORIDE 100 MCG: 10 INJECTION INTRAVENOUS at 12:28

## 2022-01-01 RX ADMIN — CALCIUM GLUCONATE 1000 MG: 98 INJECTION, SOLUTION INTRAVENOUS at 13:42

## 2022-01-01 RX ADMIN — IPRATROPIUM BROMIDE AND ALBUTEROL SULFATE 1 AMPULE: .5; 2.5 SOLUTION RESPIRATORY (INHALATION) at 13:20

## 2022-01-01 RX ADMIN — PANTOPRAZOLE SODIUM 40 MG: 40 INJECTION, POWDER, FOR SOLUTION INTRAVENOUS at 10:12

## 2022-01-01 RX ADMIN — METOPROLOL TARTRATE 50 MG: 50 TABLET, FILM COATED ORAL at 15:26

## 2022-01-01 RX ADMIN — ACETYLCYSTEINE 400 MG: 100 SOLUTION ORAL; RESPIRATORY (INHALATION) at 08:39

## 2022-01-01 RX ADMIN — PANTOPRAZOLE SODIUM 40 MG: 40 INJECTION, POWDER, FOR SOLUTION INTRAVENOUS at 08:56

## 2022-01-01 RX ADMIN — ASPIRIN 81 MG CHEWABLE TABLET 81 MG: 81 TABLET CHEWABLE at 17:16

## 2022-01-01 RX ADMIN — SODIUM CHLORIDE, PRESERVATIVE FREE 10 ML: 5 INJECTION INTRAVENOUS at 11:40

## 2022-01-01 RX ADMIN — METOPROLOL TARTRATE 50 MG: 50 TABLET, FILM COATED ORAL at 13:48

## 2022-01-01 RX ADMIN — FLUCONAZOLE 200 MG: 40 POWDER, FOR SUSPENSION ORAL at 21:45

## 2022-01-01 RX ADMIN — ASPIRIN 81 MG CHEWABLE TABLET 81 MG: 81 TABLET CHEWABLE at 11:22

## 2022-01-01 RX ADMIN — FUROSEMIDE 40 MG: 10 INJECTION, SOLUTION INTRAMUSCULAR; INTRAVENOUS at 12:24

## 2022-01-01 RX ADMIN — AMPICILLIN SODIUM AND SULBACTAM SODIUM 3000 MG: 2; 1 INJECTION, POWDER, FOR SOLUTION INTRAMUSCULAR; INTRAVENOUS at 12:07

## 2022-01-01 RX ADMIN — IPRATROPIUM BROMIDE AND ALBUTEROL SULFATE 1 AMPULE: .5; 2.5 SOLUTION RESPIRATORY (INHALATION) at 09:37

## 2022-01-01 RX ADMIN — DEXTROSE MONOHYDRATE: 50 INJECTION, SOLUTION INTRAVENOUS at 12:47

## 2022-01-01 RX ADMIN — OXYCODONE 5 MG: 5 TABLET ORAL at 00:33

## 2022-01-01 RX ADMIN — MORPHINE SULFATE 2 MG/HR: 10 INJECTION, SOLUTION INTRAMUSCULAR; INTRAVENOUS at 18:20

## 2022-01-01 RX ADMIN — HALOPERIDOL LACTATE 2 MG: 5 INJECTION, SOLUTION INTRAMUSCULAR at 19:52

## 2022-01-01 RX ADMIN — SODIUM CHLORIDE: 9 INJECTION, SOLUTION INTRAVENOUS at 16:15

## 2022-01-01 RX ADMIN — SODIUM CHLORIDE 25 ML: 9 INJECTION, SOLUTION INTRAVENOUS at 23:06

## 2022-01-01 RX ADMIN — DEXTROSE AND SODIUM CHLORIDE: 5; 900 INJECTION, SOLUTION INTRAVENOUS at 05:53

## 2022-01-01 RX ADMIN — PROPOFOL 25 MG: 10 INJECTION, EMULSION INTRAVENOUS at 16:06

## 2022-01-01 RX ADMIN — DEXTROSE MONOHYDRATE 100 MG: 50 INJECTION, SOLUTION INTRAVENOUS at 10:36

## 2022-01-01 RX ADMIN — VANCOMYCIN HYDROCHLORIDE 1000 MG: 1 INJECTION, POWDER, LYOPHILIZED, FOR SOLUTION INTRAVENOUS at 12:41

## 2022-01-01 RX ADMIN — ASPIRIN 81 MG CHEWABLE TABLET 81 MG: 81 TABLET CHEWABLE at 09:58

## 2022-01-01 RX ADMIN — MORPHINE SULFATE 2 MG: 2 INJECTION, SOLUTION INTRAMUSCULAR; INTRAVENOUS at 18:43

## 2022-01-01 RX ADMIN — ASPIRIN 81 MG CHEWABLE TABLET 81 MG: 81 TABLET CHEWABLE at 10:13

## 2022-01-01 RX ADMIN — Medication 10 ML: at 11:35

## 2022-01-01 RX ADMIN — Medication 10 ML: at 09:59

## 2022-01-01 RX ADMIN — DEXAMETHASONE SODIUM PHOSPHATE 10 MG: 10 INJECTION, SOLUTION INTRAMUSCULAR; INTRAVENOUS at 12:15

## 2022-01-01 RX ADMIN — METOPROLOL TARTRATE 50 MG: 50 TABLET, FILM COATED ORAL at 20:51

## 2022-01-01 RX ADMIN — PHENYLEPHRINE HYDROCHLORIDE 200 MCG: 10 INJECTION INTRAVENOUS at 11:46

## 2022-01-01 RX ADMIN — METOPROLOL TARTRATE 5 MG: 1 INJECTION, SOLUTION INTRAVENOUS at 21:19

## 2022-01-01 RX ADMIN — PANTOPRAZOLE SODIUM 40 MG: 40 INJECTION, POWDER, FOR SOLUTION INTRAVENOUS at 08:55

## 2022-01-01 RX ADMIN — PHENYLEPHRINE HYDROCHLORIDE 200 MCG: 10 INJECTION INTRAVENOUS at 12:10

## 2022-01-01 RX ADMIN — Medication 0.5 MCG/KG/HR: at 20:14

## 2022-01-01 RX ADMIN — INSULIN LISPRO 1 UNITS: 100 INJECTION, SOLUTION INTRAVENOUS; SUBCUTANEOUS at 05:00

## 2022-01-01 RX ADMIN — SODIUM CHLORIDE, PRESERVATIVE FREE 10 ML: 5 INJECTION INTRAVENOUS at 20:22

## 2022-01-01 RX ADMIN — ASPIRIN 81 MG CHEWABLE TABLET 81 MG: 81 TABLET CHEWABLE at 08:17

## 2022-01-01 RX ADMIN — PANTOPRAZOLE SODIUM 40 MG: 40 INJECTION, POWDER, FOR SOLUTION INTRAVENOUS at 08:44

## 2022-01-01 RX ADMIN — DILTIAZEM HYDROCHLORIDE 15 MG/HR: 5 INJECTION, SOLUTION INTRAVENOUS at 05:51

## 2022-01-01 RX ADMIN — Medication 0.6 MCG/KG/HR: at 12:45

## 2022-01-01 RX ADMIN — ENOXAPARIN SODIUM 40 MG: 100 INJECTION SUBCUTANEOUS at 20:13

## 2022-01-01 RX ADMIN — SODIUM CHLORIDE: 9 INJECTION, SOLUTION INTRAVENOUS at 16:37

## 2022-01-01 RX ADMIN — SODIUM CHLORIDE: 9 INJECTION, SOLUTION INTRAVENOUS at 12:38

## 2022-01-01 ASSESSMENT — PAIN SCALES - GENERAL
PAINLEVEL_OUTOF10: 0
PAINLEVEL_OUTOF10: 0
PAINLEVEL_OUTOF10: 1
PAINLEVEL_OUTOF10: 0
PAINLEVEL_OUTOF10: 8
PAINLEVEL_OUTOF10: 0
PAINLEVEL_OUTOF10: 5
PAINLEVEL_OUTOF10: 0
PAINLEVEL_OUTOF10: 5
PAINLEVEL_OUTOF10: 10
PAINLEVEL_OUTOF10: 0
PAINLEVEL_OUTOF10: 0
PAINLEVEL_OUTOF10: 1
PAINLEVEL_OUTOF10: 0
PAINLEVEL_OUTOF10: 7
PAINLEVEL_OUTOF10: 0
PAINLEVEL_OUTOF10: 5
PAINLEVEL_OUTOF10: 0
PAINLEVEL_OUTOF10: 10
PAINLEVEL_OUTOF10: 1
PAINLEVEL_OUTOF10: 0
PAINLEVEL_OUTOF10: 0
PAINLEVEL_OUTOF10: 8
PAINLEVEL_OUTOF10: 0
PAINLEVEL_OUTOF10: 1
PAINLEVEL_OUTOF10: 4
PAINLEVEL_OUTOF10: 0
PAINLEVEL_OUTOF10: 7

## 2022-01-01 ASSESSMENT — PAIN SCALES - WONG BAKER

## 2022-01-01 ASSESSMENT — PAIN SCALES - PAIN ASSESSMENT IN ADVANCED DEMENTIA (PAINAD)
FACIALEXPRESSION: 0
CONSOLABILITY: 1
FACIALEXPRESSION: 0
NEGVOCALIZATION: 1
TOTALSCORE: 3
BREATHING: 1
NEGVOCALIZATION: 0
FACIALEXPRESSION: 0
BREATHING: 1
CONSOLABILITY: 0
FACIALEXPRESSION: 0
BREATHING: 1
TOTALSCORE: 3
FACIALEXPRESSION: 0
CONSOLABILITY: 0
TOTALSCORE: 3
TOTALSCORE: 3
CONSOLABILITY: 1
NEGVOCALIZATION: 1
TOTALSCORE: 5
BREATHING: 1
BREATHING: 2
FACIALEXPRESSION: 0
NEGVOCALIZATION: 1
BREATHING: 1
TOTALSCORE: 0
BREATHING: 1
NEGVOCALIZATION: 1
CONSOLABILITY: 0
BODYLANGUAGE: 0
FACIALEXPRESSION: 0
TOTALSCORE: 3
BREATHING: 0
BREATHING: 1
BREATHING: 1
BREATHING: 0
NEGVOCALIZATION: 1
BODYLANGUAGE: 0
FACIALEXPRESSION: 1
BODYLANGUAGE: 0
NEGVOCALIZATION: 2
TOTALSCORE: 2
BODYLANGUAGE: 1
NEGVOCALIZATION: 0
TOTALSCORE: 1
NEGVOCALIZATION: 0
NEGVOCALIZATION: 2
BODYLANGUAGE: 0
TOTALSCORE: 2
BREATHING: 2
CONSOLABILITY: 0
NEGVOCALIZATION: 1
CONSOLABILITY: 0
BREATHING: 1
BODYLANGUAGE: 0
CONSOLABILITY: 1
CONSOLABILITY: 0
BREATHING: 0
BREATHING: 1
FACIALEXPRESSION: 1
BREATHING: 1
BODYLANGUAGE: 1
FACIALEXPRESSION: 0
BREATHING: 0
FACIALEXPRESSION: 0
BREATHING: 0
CONSOLABILITY: 0
BREATHING: 1
CONSOLABILITY: 0
BODYLANGUAGE: 0
BODYLANGUAGE: 1
CONSOLABILITY: 1
BODYLANGUAGE: 0
FACIALEXPRESSION: 0
NEGVOCALIZATION: 0
BREATHING: 1
FACIALEXPRESSION: 1
BODYLANGUAGE: 0
CONSOLABILITY: 0
FACIALEXPRESSION: 0
BODYLANGUAGE: 0
FACIALEXPRESSION: 0
TOTALSCORE: 8
BODYLANGUAGE: 1
NEGVOCALIZATION: 1
TOTALSCORE: 0
CONSOLABILITY: 0
BREATHING: 0
BODYLANGUAGE: 1
CONSOLABILITY: 0
BODYLANGUAGE: 0
BODYLANGUAGE: 1
NEGVOCALIZATION: 0
NEGVOCALIZATION: 2
BODYLANGUAGE: 0
NEGVOCALIZATION: 0
BREATHING: 1
BREATHING: 0
TOTALSCORE: 2
FACIALEXPRESSION: 0
FACIALEXPRESSION: 1
FACIALEXPRESSION: 1
NEGVOCALIZATION: 1
FACIALEXPRESSION: 0
CONSOLABILITY: 0
TOTALSCORE: 1
NEGVOCALIZATION: 1
BODYLANGUAGE: 1
CONSOLABILITY: 0
TOTALSCORE: 4
FACIALEXPRESSION: 0
TOTALSCORE: 2
TOTALSCORE: 0
FACIALEXPRESSION: 0
TOTALSCORE: 5
NEGVOCALIZATION: 0
NEGVOCALIZATION: 1
BODYLANGUAGE: 0
TOTALSCORE: 2
BODYLANGUAGE: 0
BODYLANGUAGE: 0
CONSOLABILITY: 0
CONSOLABILITY: 0
TOTALSCORE: 3
BODYLANGUAGE: 0
FACIALEXPRESSION: 1
FACIALEXPRESSION: 1
BREATHING: 2
BREATHING: 0
CONSOLABILITY: 2
NEGVOCALIZATION: 1
NEGVOCALIZATION: 1
BREATHING: 1
NEGVOCALIZATION: 1
CONSOLABILITY: 2
CONSOLABILITY: 1
NEGVOCALIZATION: 1
FACIALEXPRESSION: 1
BREATHING: 1
BREATHING: 0
NEGVOCALIZATION: 0
CONSOLABILITY: 0
BREATHING: 1
CONSOLABILITY: 0
TOTALSCORE: 2
CONSOLABILITY: 0
NEGVOCALIZATION: 1
FACIALEXPRESSION: 0
FACIALEXPRESSION: 0
BODYLANGUAGE: 0
FACIALEXPRESSION: 1
TOTALSCORE: 2
TOTALSCORE: 0
CONSOLABILITY: 1
TOTALSCORE: 3
TOTALSCORE: 2
BODYLANGUAGE: 0
NEGVOCALIZATION: 1
BODYLANGUAGE: 0
TOTALSCORE: 2
CONSOLABILITY: 0
BODYLANGUAGE: 0
CONSOLABILITY: 1
BODYLANGUAGE: 1
FACIALEXPRESSION: 0
CONSOLABILITY: 0
NEGVOCALIZATION: 1
CONSOLABILITY: 0
FACIALEXPRESSION: 1
BODYLANGUAGE: 1
TOTALSCORE: 1
NEGVOCALIZATION: 0
TOTALSCORE: 5
CONSOLABILITY: 0
FACIALEXPRESSION: 1
BODYLANGUAGE: 0
BODYLANGUAGE: 0
NEGVOCALIZATION: 0
BREATHING: 1
FACIALEXPRESSION: 0
BREATHING: 1
CONSOLABILITY: 0
NEGVOCALIZATION: 1
TOTALSCORE: 0
CONSOLABILITY: 0
TOTALSCORE: 5
BREATHING: 1
BREATHING: 0
BODYLANGUAGE: 0
CONSOLABILITY: 1
CONSOLABILITY: 0
BODYLANGUAGE: 0
TOTALSCORE: 5
BODYLANGUAGE: 0
NEGVOCALIZATION: 0
TOTALSCORE: 2
NEGVOCALIZATION: 0
FACIALEXPRESSION: 0
BREATHING: 1
BREATHING: 0
TOTALSCORE: 0
NEGVOCALIZATION: 1
BREATHING: 1
NEGVOCALIZATION: 0
NEGVOCALIZATION: 1
TOTALSCORE: 3
BREATHING: 1
BREATHING: 1
BODYLANGUAGE: 1
TOTALSCORE: 3
CONSOLABILITY: 1
FACIALEXPRESSION: 0
FACIALEXPRESSION: 0
TOTALSCORE: 0
TOTALSCORE: 3
NEGVOCALIZATION: 0
BREATHING: 1
NEGVOCALIZATION: 1
FACIALEXPRESSION: 0
FACIALEXPRESSION: 0
BODYLANGUAGE: 0
FACIALEXPRESSION: 1
FACIALEXPRESSION: 1
BODYLANGUAGE: 2
BODYLANGUAGE: 0
NEGVOCALIZATION: 0
FACIALEXPRESSION: 0
BODYLANGUAGE: 0
CONSOLABILITY: 1
TOTALSCORE: 5
BODYLANGUAGE: 0
TOTALSCORE: 2
CONSOLABILITY: 0
CONSOLABILITY: 0

## 2022-01-01 ASSESSMENT — PAIN - FUNCTIONAL ASSESSMENT
PAIN_FUNCTIONAL_ASSESSMENT: PREVENTS OR INTERFERES SOME ACTIVE ACTIVITIES AND ADLS
PAIN_FUNCTIONAL_ASSESSMENT: PREVENTS OR INTERFERES WITH ALL ACTIVE AND SOME PASSIVE ACTIVITIES
PAIN_FUNCTIONAL_ASSESSMENT: PREVENTS OR INTERFERES SOME ACTIVE ACTIVITIES AND ADLS
PAIN_FUNCTIONAL_ASSESSMENT: ACTIVITIES ARE NOT PREVENTED
PAIN_FUNCTIONAL_ASSESSMENT: PREVENTS OR INTERFERES SOME ACTIVE ACTIVITIES AND ADLS
PAIN_FUNCTIONAL_ASSESSMENT: PREVENTS OR INTERFERES SOME ACTIVE ACTIVITIES AND ADLS
PAIN_FUNCTIONAL_ASSESSMENT: PREVENTS OR INTERFERES WITH ALL ACTIVE AND SOME PASSIVE ACTIVITIES

## 2022-01-01 ASSESSMENT — PAIN DESCRIPTION - LOCATION
LOCATION: OTHER (COMMENT)
LOCATION: HIP
LOCATION: OTHER (COMMENT)
LOCATION: GENERALIZED
LOCATION: HIP
LOCATION: OTHER (COMMENT)
LOCATION: BACK;LEG
LOCATION: HIP
LOCATION: HAND
LOCATION: OTHER (COMMENT)
LOCATION: HIP
LOCATION: HIP
LOCATION: OTHER (COMMENT)
LOCATION: HIP

## 2022-01-01 ASSESSMENT — ENCOUNTER SYMPTOMS
SORE THROAT: 0
SINUS PRESSURE: 0
DIARRHEA: 0
BACK PAIN: 0
EYE PAIN: 0
NAUSEA: 0
EYE DISCHARGE: 0
TACHYPNEA: 1
WHEEZING: 0
COUGH: 0
SHORTNESS OF BREATH: 0
VOMITING: 0
ABDOMINAL DISTENTION: 0
EYE REDNESS: 0

## 2022-01-01 ASSESSMENT — PAIN DESCRIPTION - DESCRIPTORS
DESCRIPTORS: JABBING;SHOOTING;STABBING
DESCRIPTORS: POUNDING
DESCRIPTORS: ACHING;THROBBING;STABBING
DESCRIPTORS: ACHING;JABBING
DESCRIPTORS: PATIENT UNABLE TO DESCRIBE
DESCRIPTORS: ACHING
DESCRIPTORS: PATIENT UNABLE TO DESCRIBE
DESCRIPTORS: ACHING
DESCRIPTORS: PATIENT UNABLE TO DESCRIBE
DESCRIPTORS: PATIENT UNABLE TO DESCRIBE
DESCRIPTORS: ACHING
DESCRIPTORS: PATIENT UNABLE TO DESCRIBE

## 2022-01-01 ASSESSMENT — PAIN DESCRIPTION - ORIENTATION
ORIENTATION: RIGHT
ORIENTATION: RIGHT
ORIENTATION: OTHER (COMMENT)
ORIENTATION: RIGHT;LEFT
ORIENTATION: RIGHT
ORIENTATION: RIGHT
ORIENTATION: RIGHT;LEFT;MID
ORIENTATION: RIGHT
ORIENTATION: OTHER (COMMENT)
ORIENTATION: RIGHT

## 2022-01-01 ASSESSMENT — PAIN DESCRIPTION - PAIN TYPE: TYPE: ACUTE PAIN

## 2022-01-28 NOTE — ED PROVIDER NOTES
[facility]  Department of Emergency Medicine   ED  Encounter Note  Admit Date/RoomTime: 2022 10:58 AM  ED Room:   NAME: Surendra Simental  : 10/17/1928  MRN: [id number]     Chief Complaint:  Hand Pain (right hand-unable to use hand )    HISTORY OF PRESENT ILLNESS        Surendra Simental is a 80 y.o. female who presents to the ED right wrist and right hand pain. Patient does have a history of arthritis and Paget's disease. Has had bone remodeling in her right wrist many years ago. Patient states when she woke up this morning she just had an awful pain in her right wrist and right hand. It hurts all through the wrist anytime she tries to twist or flex. Pain also into the top of the right hand. Patient is unsure if she slept on it wrong? Daughter at bedside is unsure if she injured it somehow? Patient denies any neck pain. Denies any pain, numbness or tingling coming from the neck down the right arm. Patient is right-handed. She rates the pain a 10 out of 10. Worse with any movement. ROS   Pertinent positives and negatives are stated within HPI, all other systems reviewed and are negative. Past Medical History:  has a past medical history of Arthritis, Osteoporosis, and Paget's disease. Surgical History:  has a past surgical history that includes Colon surgery; knee surgery; Lung biopsy; Total hip arthroplasty; joint replacement; Nerve Block (2011); Nerve Block (2011); Nerve Block (2011); Nerve Block (2012); Nerve Block; Nerve Block (13); Nerve Block (Right, 2013); Nerve Block (Right,  13); Nerve Block (Right, 2013); other surgical history (Right, 2013); Nerve Block (Right,  15 14); Nerve Block (Right, 2014); Nerve Block (14); Nerve Block (Bilateral, 10/29/2014); Nerve Block (Bilateral, 2014); Nerve Block (Bilateral, 14); Nerve Block (Right, 2017);  Nerve Block (Right, 2017); Nerve Block (Bilateral, 06/12/2017); Nerve Block (Bilateral, 06/21/2017); Nerve Block (Bilateral, 09/09/2020); Nerve Block (Bilateral, 9/9/2020); Nerve Block (Bilateral, 09/16/2020); and Nerve Block (Bilateral, 9/16/2020). Social History:  reports that she has never smoked. She has never used smokeless tobacco. She reports that she does not drink alcohol. Family History: family history is not on file. Allergies: Biaxin [clarithromycin] and Floxin [ofloxacin]    PHYSICAL EXAM   Oxygen Saturation Interpretation: Normal on room air analysis. ED Triage Vitals [01/28/22 1103]   BP Temp Temp Source Pulse Resp SpO2 Height Weight   (!) 156/82 97.3 °F (36.3 °C) Esophageal 87 20 99 % 5' 3\" (1.6 m) 93 lb (42.2 kg)       General:  NAD. Alert and Oriented. Well-appearing. Skin:  Warm, dry. No rashes. Head:  Normocephalic. Atraumatic. Eyes:  EOMI. Conjunctiva normal.  ENT:  Oral mucosa moist.  Airway patent. Neck:  Supple. Normal ROM. Respiratory:  No respiratory distress. No labored breathing. Lungs clear without rales, rhonchi or wheezing. Cardiovascular:  Regular rate. No peripheral edema. Extremities warm and good color. Extremities: Overall right wrist and right hand are not swollen, not erythematous, not warm to the touch. There is no areas of ecchymosis. Radial head on the dorsal aspect is quite pronounced. Per patient and family that has been chronic for years secondary to her Paget's disease. Wrist is tender to palpation to the volar aspect. Pain is reproduced with supination and flexion. She complains of pain along metacarpals 2, 3 and 4 to the dorsum of the right hand. Pain is reproduced in that area also with supination. Patient can flex and extend all fingers of the right hand. She does have a poor  strength on the right secondary to pain. 2+ right radial pulse. Back:  Normal ROM. Nontender to palpation.   Neuro:  Alert and Oriented to person, place, time and situation. Normal LOC. Moves all extremities. Speech fluent. Psych:  Calm and Cooperative. Normal thought process. Normal judgement. Lab / Imaging Results   (All laboratory and radiology results have been personally reviewed by myself)  Labs:  No results found for this visit on 01/28/22. Imaging: All Radiology results interpreted by Radiologist unless otherwise noted. XR HAND RIGHT (MIN 3 VIEWS)   Final Result   No acute fracture or dislocation. Osseous deformities involving the distal   radius and ulna, which are described in full detail on the report from the   patient's wrist radiographs performed the same day. Please refer to that   transcription. Diffuse osteopenia. Osteo-degenerative changes, as described   above. Periarticular chondrocalcinosis involving the 1st through 5th   metacarpophalangeal joints. XR WRIST RIGHT (MIN 3 VIEWS)   Final Result   No acute fracture or dislocation. Bowing deformity involving the distal   radius shaft, with diffuse thickening of the cortices, as well as deformity   of the distal radius, volar tilt of the radius and a dorsally subluxed ulna. Differential diagnosis includes old traumatic or postinfectious changes, with   Madelung deformity. Clinical correlation is recommended. Osteo-degenerative   changes, as described above. Diffuse osteopenia. Calcifications involving   the triangular fibrocartilage. ED Course / Medical Decision Making   Medications - No data to display     Re-examination:  1/28/22       Time:   Patients condition . Consult(s):   None    Procedure(s):   none    MDM:   Results discussed with patient and daughter at bedside. There is no acute finding. A lot of old arthritic changes. Patient has good radial pulse. Extremity is warm and pink. Other than arthritic pain, I do not have a specific diagnosis.   Patient will be prescribed Ultram, which daughter states she has taken just fine in the past.  Daughter will call Dr. Lucy Kinney today for follow-up. Plan of Care/Counseling:  Physician Assistant on duty reviewed today's visit with the patient in addition to providing specific details for the plan of care and counseling regarding the diagnosis and prognosis. Questions are answered at this time and are agreeable with the plan. ASSESSMENT     1. Right hand pain New Problem   2. Right wrist pain New Problem     PLAN   Discharged home. Patient condition is good    New Medications     New Prescriptions    TRAMADOL (ULTRAM) 50 MG TABLET    Take 1 tablet by mouth every 6 hours as needed for Pain for up to 5 days. Electronically signed by CHELA Mooney   DD: 1/28/22  **This report was transcribed using voice recognition software. Every effort was made to ensure accuracy; however, inadvertent computerized transcription errors may be present.   END OF ED PROVIDER NOTE       Lowell Sorensen, [id number]  01/28/22 [id number]

## 2022-07-13 PROBLEM — S72.002B: Status: ACTIVE | Noted: 2022-01-01

## 2022-07-13 NOTE — ED PROVIDER NOTES
This patient resides at home with another relative. The relative went out of town yesterday. Patient was last known normal in the early afternoon hours. Late this afternoon, patient was found laying on the floor by another family member. It was unknown how long she had been on the floor. She complains of right hip pain. She had been unable to ambulate. The history is provided by the patient. Fall  The accident occurred yesterday. The fall occurred while walking. Distance fallen: Standing height. She landed on a hard floor. The pain is mild. Pertinent negatives include no fever, no nausea, no vomiting and no headaches. The symptoms are aggravated by use of the injured limb. Treatment on scene includes a c-collar. Review of Systems   Constitutional: Negative for chills and fever. HENT: Negative for ear pain, sinus pressure and sore throat. Eyes: Negative for pain, discharge and redness. Respiratory: Negative for cough, shortness of breath and wheezing. Cardiovascular: Negative for chest pain. Gastrointestinal: Negative for abdominal distention, diarrhea, nausea and vomiting. Genitourinary: Negative for dysuria and frequency. Musculoskeletal: Positive for arthralgias. Negative for back pain. Skin: Positive for wound. Negative for rash. Neurological: Negative for weakness and headaches. Hematological: Negative for adenopathy. All other systems reviewed and are negative. Physical Exam  Vitals and nursing note reviewed. Constitutional:       Appearance: She is well-developed. HENT:      Head: Normocephalic and atraumatic. Eyes:      Pupils: Pupils are equal, round, and reactive to light. Cardiovascular:      Rate and Rhythm: Normal rate and regular rhythm. Heart sounds: Normal heart sounds. No murmur heard. Pulmonary:      Effort: Pulmonary effort is normal. No respiratory distress. Breath sounds: Normal breath sounds. No wheezing or rales. Abdominal:      General: Bowel sounds are normal.      Palpations: Abdomen is soft. Tenderness: There is no abdominal tenderness. There is no guarding or rebound. Musculoskeletal:         General: Tenderness present. Cervical back: Normal range of motion and neck supple. Comments: Tenderness to palpation of the right lateral hip. Decreased range of motion or secondary to pain. Outward rotation and slight shortening of this limb as well. Skin:     General: Skin is warm and dry. Comments: Skin tears are noted on the bilateral anterior lower shins. Neurological:      Mental Status: She is alert and oriented to person, place, and time. Cranial Nerves: No cranial nerve deficit. Coordination: Coordination normal.          Procedures     MDM     EKG: This EKG is signed and interpreted by me. Rate: 103  Rhythm: Sinus and occasional PVCs  Interpretation: no acute changes and non-specific EKG  Comparison: changes compared to previous EKG      9:07 PM EDT  Discussed results with patient and family. Deepak with admission here. They request Dr. Kajal Azul for orthopedics. Call placed to primary care. 9:32 PM EDT  Discussed with Dr. Gina Lundy. He will admit. Deepak for consult to Dr. Kajal Azul. Consult placed.         --------------------------------------------- PAST HISTORY ---------------------------------------------  Past Medical History:  has a past medical history of Arthritis, Osteoporosis, and Paget's disease. Past Surgical History:  has a past surgical history that includes Colon surgery; knee surgery; Lung biopsy; Total hip arthroplasty; joint replacement; Nerve Block (11-); Nerve Block (11 16 2011); Nerve Block (11 30 2011); Nerve Block (12 28 2012); Nerve Block; Nerve Block (1 21 13); Nerve Block (Right, 06 12 2013); Nerve Block (Right, 6 24 13); Nerve Block (Right, 07 24 2013); other surgical history (Right, 09 20 2013); Nerve Block (Right, 1 15 14);  Nerve Block (Right, 2/19/2014); Nerve Block (03/05/14); Nerve Block (Bilateral, 10/29/2014); Nerve Block (Bilateral, 11/5/2014); Nerve Block (Bilateral, 11/12/14); Nerve Block (Right, 05/22/2017); Nerve Block (Right, 06/05/2017); Nerve Block (Bilateral, 06/12/2017); Nerve Block (Bilateral, 06/21/2017); Nerve Block (Bilateral, 09/09/2020); Nerve Block (Bilateral, 9/9/2020); Nerve Block (Bilateral, 09/16/2020); and Nerve Block (Bilateral, 9/16/2020). Social History:  reports that she has never smoked. She has never used smokeless tobacco. She reports that she does not drink alcohol. Family History: family history is not on file. The patients home medications have been reviewed.     Allergies: Biaxin [clarithromycin] and Floxin [ofloxacin]    -------------------------------------------------- RESULTS -------------------------------------------------    LABS:  Results for orders placed or performed during the hospital encounter of 07/13/22   CBC with Auto Differential   Result Value Ref Range    WBC 17.6 (H) 4.5 - 11.5 E9/L    RBC 3.59 3.50 - 5.50 E12/L    Hemoglobin 10.7 (L) 11.5 - 15.5 g/dL    Hematocrit 33.5 (L) 34.0 - 48.0 %    MCV 93.3 80.0 - 99.9 fL    MCH 29.8 26.0 - 35.0 pg    MCHC 31.9 (L) 32.0 - 34.5 %    RDW 15.1 (H) 11.5 - 15.0 fL    Platelets 352 732 - 193 E9/L    MPV 10.6 7.0 - 12.0 fL    Neutrophils % 85.9 (H) 43.0 - 80.0 %    Immature Granulocytes % 0.6 0.0 - 5.0 %    Lymphocytes % 4.0 (L) 20.0 - 42.0 %    Monocytes % 9.3 2.0 - 12.0 %    Eosinophils % 0.0 0.0 - 6.0 %    Basophils % 0.2 0.0 - 2.0 %    Neutrophils Absolute 15.09 (H) 1.80 - 7.30 E9/L    Immature Granulocytes # 0.11 E9/L    Lymphocytes Absolute 0.71 (L) 1.50 - 4.00 E9/L    Monocytes Absolute 1.63 (H) 0.10 - 0.95 E9/L    Eosinophils Absolute 0.00 (L) 0.05 - 0.50 E9/L    Basophils Absolute 0.03 0.00 - 0.20 E9/L    RBC Morphology Normal    Comprehensive Metabolic Panel   Result Value Ref Range    Sodium 143 132 - 146 mmol/L    Potassium 5.0 3.5 - 5.0 mmol/L    Chloride 104 98 - 107 mmol/L    CO2 28 22 - 29 mmol/L    Anion Gap 11 7 - 16 mmol/L    Glucose 135 (H) 74 - 99 mg/dL    BUN 37 (H) 6 - 23 mg/dL    CREATININE 0.6 0.5 - 1.0 mg/dL    GFR Non-African American >60 >=60 mL/min/1.73    GFR African American >60     Calcium 9.7 8.6 - 10.2 mg/dL    Total Protein 6.6 6.4 - 8.3 g/dL    Albumin 3.7 3.5 - 5.2 g/dL    Total Bilirubin 1.1 0.0 - 1.2 mg/dL    Alkaline Phosphatase 95 35 - 104 U/L    ALT 18 0 - 32 U/L    AST 45 (H) 0 - 31 U/L   Troponin   Result Value Ref Range    Troponin, High Sensitivity 25 (H) 0 - 9 ng/L   CK   Result Value Ref Range    Total  (H) 20 - 180 U/L   EKG 12 Lead   Result Value Ref Range    Ventricular Rate 103 BPM    Atrial Rate 103 BPM    P-R Interval 122 ms    QRS Duration 60 ms    Q-T Interval 298 ms    QTc Calculation (Bazett) 390 ms    P Axis 86 degrees    R Axis 75 degrees    T Axis 85 degrees   TYPE AND SCREEN   Result Value Ref Range    ABO/Rh O POS     Antibody Screen NEG        RADIOLOGY:  XR HIP RIGHT (2-3 VIEWS)   Final Result   Displaced right intertrochanteric fracture with varus deformity. .         XR CHEST 1 VIEW   Final Result   No acute process. CT HEAD WO CONTRAST   Final Result   No acute intracranial abnormality. CT CERVICAL SPINE WO CONTRAST   Final Result   No acute abnormality of the cervical spine. Multilevel degenerative changes. ------------------------- NURSING NOTES AND VITALS REVIEWED ---------------------------  Date / Time Roomed:  7/13/2022  6:59 PM  ED Bed Assignment:  08/08    The nursing notes within the ED encounter and vital signs as below have been reviewed.      Patient Vitals for the past 24 hrs:   BP Temp Pulse Resp SpO2   07/13/22 1925 -- -- -- -- 94 %   07/13/22 1914 (!) 159/77 -- (!) 103 16 --   07/13/22 1908 (!) 175/72 98.4 °F (36.9 °C) -- -- --       Oxygen Saturation Interpretation: Normal    ------------------------------------------ PROGRESS NOTES ------------------------------------------  Re-evaluation(s):  Time: 2102  Patients symptoms are improving  Repeat physical examination is improved    Counseling:  I have spoken with the patient and family and discussed todays results, in addition to providing specific details for the plan of care and counseling regarding the diagnosis and prognosis. Their questions are answered at this time and they are agreeable with the plan of admission.    --------------------------------- ADDITIONAL PROVIDER NOTES ---------------------------------  Consultations:  Spoke with Dr. Gio Bourgeois. Discussed case. They will admit the patient. This patient's ED course included: a personal history and physicial examination, multiple bedside re-evaluations, IV medications, cardiac monitoring and continuous pulse oximetry    This patient has remained hemodynamically stable during their ED course. Diagnosis:  1. Closed fracture of right hip, initial encounter ([facility] [address]    2. Fall on same level from slipping, tripping or stumbling, initial encounter    3. Traumatic rhabdomyolysis, initial encounter [facility])        Disposition:  Patient's disposition: Admit to telemetry  Patient's condition is stable.          Yeimy Arteaga, [place]  07/13/22 2137

## 2022-07-14 NOTE — CONSULTS
[address] [place]                                  CONSULTATION    PATIENT NAME: Luci Triplett                 :        10/17/1928  MED REC NO:   [id number]                            ROOM:       8741  ACCOUNT NO:   [de-identified]                           ADMIT DATE: 2022  PROVIDER:     Kai Torres MD    CONSULT DATE:  2022    HISTORY OF PRESENT ILLNESS:  The patient is a 24-year-old lady who I  apparently saw many years ago. I need to review her record in my  office. She was admitted to the hospital with pain in her right chest.  She  apparently tripped and fell down about a week ago at home. She was found to have hip fracture, and she is scheduled for orthopedic  surgery. She was found to have tachycardia on the monitor and cardiac consult was  requested. The patient was lying flat in bed when I came to see her. She looked  very debilitated and frail. She did not appear in acute distress. PAST MEDICAL HISTORY:  As above plus history of Paget's disease. Arthritis. HABITS:  No history of smoking. REVIEW OF SYSTEMS:  I could not get any review of systems from the  patient due to her mental status. PHYSICAL EXAMINATION:  GENERAL:  The patient was lying flat, no acute distress. VITAL SIGNS:  Blood pressure 159/77, heart rate 103, temperature 98.4. NECK:  No JVD. HEART:  Regular S1 and S2 with premature beats. No S3.  1/6 right  murmur heard best at the left sternal border. LUNGS:  Mildly diminished breath sounds in the bases. I could not hear  rales or wheezing. ABDOMEN:  Soft, nontender. EXTREMITIES:  No edema, cyanosis or clubbing. NEUROLOGIC:  Generalized body weakness. No focal deficits. EKG showing sinus rhythm with sinus tachycardia with frequent PACs and  nonspecific ST-T wave changes. LABS:  Troponin is 25, which is borderline elevated.   Sodium 143,  potassium 5.0, BUN 37, creatinine 0.6. WBC 17.6, hemoglobin 10.7,  platelet count 068. IMPRESSION:  1. Right hip fracture. 2.  The patient is waiting for orthopedic surgery. From the cardiac  standpoint, she has sinus rhythm with frequent PACs but no evidence of  atrial fibrillation on the EKG. I will keep her for now on her  beta-blocker. She had slight troponin elevation around 25 and that can  be demand ischemia from the pain of her fracture with type 2 non-STEMI. EKG is not showing acute ischemic changes. With her advanced age and  with her serious fracture, I do not see a need to delay her surgery for  further cardiac workup. I feel she can undergo surgery from cardiac  standpoint. I feel her risk of surgery is more, again, due to her  advanced age and debilitated status.         Nino Muniz MD    D: 07/14/2022 15:15:48       T: 07/14/2022 15:20:13     [id number]  Job#: [id number]     Doc#: [id number]    CC:

## 2022-07-14 NOTE — CONSULTS
Department of Orthopedic Surgery  Consult note      CHIEF COMPLAINT:   Chief Complaint   Patient presents with    Fall       HISTORY OF PRESENT ILLNESS:                The patient is a 80 y.o. female who presents with right hip pain that started earlier today. Per patient she states that she fell a week ago. After review of EMS note patient lives at home with a relative who left town yesterday. She was apparently normal this morning when seen by a another relative. She was then found later this afternoon on the floor by another family member. She was unable to ambulate since that time. Unable to obtain further history due to patient's mental status.     Past Medical History:        Diagnosis Date    Arthritis     Osteoporosis     Paget's disease      Past Surgical History:        Procedure Laterality Date    COLON SURGERY      Diverticuli    JOINT REPLACEMENT      left 2010    KNEE SURGERY      Left x 2    LUNG BIOPSY      NERVE BLOCK  11-    NERVE BLOCK  11 16 2011    right cervical paravertebral facet #2    NERVE BLOCK  11 30 2011    NERVE BLOCK  12 28 2012    cervical epidural #1    NERVE BLOCK      1-9-2013    NERVE BLOCK  1 21 13    cerv ep #3    NERVE BLOCK Right 06 12 2013    cervical paravertebral facet #1    NERVE BLOCK Right 6 24 13    cerv facet #2    NERVE BLOCK Right 07 24 2013    cervical paravertebral facet #3    NERVE BLOCK Right 1 15 14    cerv transforam #1    NERVE BLOCK Right 2/19/2014    right cervical transforaminal nerve block  #2    NERVE BLOCK  03/05/14    transforaminal nerve block right cervical #3    NERVE BLOCK Bilateral 10/29/2014    himanshu lumbar paravertebral facet  #1    NERVE BLOCK Bilateral 11/5/2014    bilateral paravertebral facet  lumbar  #2    NERVE BLOCK Bilateral 11/12/14    paravertebral facet lumbar #3    NERVE BLOCK Right 05/22/2017    transforaminal cervical #1    NERVE BLOCK Right 06/05/2017    transforaminal #2    NERVE BLOCK Bilateral 06/12/2017    lumbar paravertebral facet #1    NERVE BLOCK Bilateral 06/21/2017    Bilatera lumbar paravertebral facet block 32 l3-4 l4-5 l5-s1    NERVE BLOCK Bilateral 09/09/2020    lumbar facet block    NERVE BLOCK Bilateral 9/9/2020    BILATERAL LUMBAR FACET BLOCKS AT L3-4,L4-5,L5-S1 x2 UNDER X-RAY #1 performed by Ayaz Tucker DO at [facility] Bilateral 09/16/2020    lumbar facet paravertebral    NERVE BLOCK Bilateral 9/16/2020    BILATERAL LUMBAR FACET BLOCKS AT L3-4,L4-5,L5-S1 x2 UNDER X-RAY #2 performed by Ayaz Tucker DO at [address] [place] [address] Right 09 20 2013    radiofrequency neurolysis medial branch nerve cervical    TOTAL HIP ARTHROPLASTY      Left     Current Medications:   Current Facility-Administered Medications: 0.9 % sodium chloride bolus, 1,000 mL, IntraVENous, Once  morphine (PF) injection 2 mg, 2 mg, IntraVENous, Q2H PRN **OR** morphine injection 4 mg, 4 mg, IntraVENous, Q2H PRN  oxyCODONE (ROXICODONE) immediate release tablet 5 mg, 5 mg, Oral, Q4H PRN **OR** oxyCODONE (ROXICODONE) immediate release tablet 10 mg, 10 mg, Oral, Q4H PRN  Allergies:  Biaxin [clarithromycin] and Floxin [ofloxacin]    Social History:   TOBACCO:   reports that she has never smoked. She has never used smokeless tobacco.  ETOH:   reports no history of alcohol use. DRUGS:   has no history on file for drug use. ACTIVITIES OF DAILY LIVING:    OCCUPATION:    Family History:   No family history on file.     REVIEW OF SYSTEMS:   Skin: no abnormal pigmentation, rash  Eyes: no blurring or eye pain   Ears/Nose/Throat: no hearing loss, tinnitus  Respiratory: No increased work of breathing, no coughing  Cardiovascular: Brisk capillary refill bilaterally, well perfused extremities  Gastrointestinal: no nausea, vomiting  Neurologic: no paralysis, or seizures  Musculoskeletal: Right hip pain      PHYSICAL EXAM:    VITALS:  /68   Pulse (!) 102   Temp 97.7 °F (36.5 °C) tomorrow pending OR availability and medical clearance. I was present with the resident during the history and exam. I discussed the case with the resident and agree with the findings and plan as documented in the resident's note.

## 2022-07-14 NOTE — PROGRESS NOTES
Patient coughing with food and liquids, made NPO and notified charge to notify Dr Mathew Qiu, pulse ox 81-84% on room air and lung sounds rhonchi to upper lobes, oxygen applied at 3L sat increased to 94%

## 2022-07-14 NOTE — PROGRESS NOTES
Spoke with pt's granddaughter Myriam Shea and daughter Samuel Yoon.  Update given, all questions answered at this time

## 2022-07-15 NOTE — PROGRESS NOTES
Speech Language Pathology      NAME:  Rosalba Avendano  :  10/17/1928  DATE: 7/15/2022  ROOM:  1064/9618-51    Order received. Chart reviewed. Pt unavailable at this time due to:  [x] HOLD per RN, Pt NPO for surgery at 11:00 will attempt to eval at end of day if cleared from surgery for PO intake   [] Off unit for testing/ procedure    [] With medical staff   [] Declined intervention  [] Sleeping/ Lethargic   [] Other:     Will re-attempt later this date as able. Thank you. Closed fracture of right hip, initial encounter [facility]) [S72.001A]  Hip fracture requiring operative repair, left, open type I or II, initial encounter ([facility] [address] [S72.002B]  Fall on same level from slipping, tripping or stumbling, initial encounter [W01. 0XXA]  Traumatic rhabdomyolysis, initial encounter ([facility] [address] Amanda Amin. 6XXA]        Arelis Warren MSCCC/SLP  Speech Language Pathologist  [phone]

## 2022-07-15 NOTE — ANESTHESIA POSTPROCEDURE EVALUATION
Department of Anesthesiology  Postprocedure Note    Patient: Mell Gibson  MRN: [id number]  YOB: 1928  Date of evaluation: 7/15/2022      Procedure Summary     Date: 07/15/22 Room / Location: [address] [place] [address] 03 / [address] [facility]    Anesthesia Start: 0038 Anesthesia Stop: 8997    Procedure: RIGHT INTROCHANTERIC CEPHALOMEDULLARY NAIL  INSERTION (Left: Leg Upper) Diagnosis:       S/p left hip fracture      (S/p left hip fracture [Z87.81])    Surgeons: Barb Collet, DO Responsible Provider: Amandeep Gagnon MD    Anesthesia Type: general ASA Status: 3          Anesthesia Type: No value filed.     Rebeca Phase I: Rebeca Score: 8    Rebeca Phase II:        Anesthesia Post Evaluation    Patient location during evaluation: PACU  Patient participation: waiting for patient participation  Level of consciousness: lethargic  Pain score: 3  Airway patency: patent  Nausea & Vomiting: no nausea  Complications: no  Cardiovascular status: blood pressure returned to baseline  Respiratory status: acceptable  Hydration status: euvolemic

## 2022-07-15 NOTE — OP NOTE
Operative Note      Patient: Colton Lowe  YOB: 1928  MRN: [id number]    Date of Procedure: 7/15/2022    Pre-Op Diagnosis: S/p left hip fracture [Z87.81]    Post-Op Diagnosis: Same       Procedure(s):  RIGHT INTROCHANTERIC CEPHALOMEDULLARY NAIL  INSERTION    Surgeon(s): Keesha Abel DO    Assistant:   Resident: Dimas Barroso DO; Kar Stover DO    Anesthesia: General    Estimated Blood Loss (mL): less than 50     Complications: None    Specimens:   * No specimens in log *    Implants:  Implant Name Type Inv. Item Serial No.  Lot No. LRB No. Used Action          1 Implanted         Drains:   Urinary Catheter [place] (Active)       Findings: as below    Detailed Description of Procedure:   Below    Pre-operative Diagnosis:  right intertrochanteric hip frx     Post-operative Diagnosis:  same     Procedure:  right hip ORIF     Surgeon:  Dr. Keven Mills     Assistant(s):  as above     Anesthesia:  Spinal anesthesia     Estimated blood loss:  Minimal     Implants:  as above     Complications:  none     Condition:  Stable     Brief Hospital Course: The patient admitted through the ER c/o right hip pain. Xrays revealed a right intertrochanteric hip fracture fx. After examination of the patient, review of the radiologic studies, and appropriate pre-operative risk assessment, Dr. Keven Mills recommended right hip ORIF, which the patient was agreeable towards. Operative Course: Outside the operating suite, the patient was seen and identified. The operative site was marked and identified as appropriate by the patient, staff, surgeon, and Anesthesia. The patient was taken into the operating room and placed under the appropriate amount of sedation under the care of the anesthesia team. The patient was transferred to the fracture table and placed in the appropriate position. All neurovascular structures and bony prominences were well padded and protected.   The fracture was reduced on the fracture table and pre-operative fluoroscopy xrays were obtained to confirm the reduction. The patient was then prepped and draped in sterile fashion. A small incision was made on the lateral aspect of the thigh proximal to the greater trochanter. A guide wire was then inserted into the greater trochanter. AP and lateral xrays were taken to confirm satisfactory placement of the guide wire. The starting reamer and soft tissue protector were inserted over the guide wire and reamed down into the proximal femur. The wire and reamer were removed and the appropriate size nail was inserted into the proximal femur. Once the nail was at the correct depth a small incision was made on the lateral aspect of the thigh and the 3-in-1 guide was inserted through the nail jig and taken to bone. A guide wire was then inserted through the lateral cortex and up into the femoral head. Ap and lateral xrays were taken to confirm center center position of the guide pin and containment within the femoral neck. The guide pin was measured and the inner sleeve of the 3-in-1 was removed. A reamer was then taken over the guide wire and up to the femoral head. The next sleeve of the 3-in-1 guide was removed and the appropriate sized lag screw was inserted through the proximal femur and into the femoral head. AP and lateral xrays were obtained to ensure satisfactory position and adequate tip to apex distance. The lag screw was then locked into the nail and turned back one half turn. The lag screw insertion handle was unscrewed and removed. The 3-in-1 guide was removed and the guide wire was pulled out. The 3-in-1 guide for the distal screw was then inserted through the CMN guide, it was able to be placed through a previous incision. The drill was taken through the lateral cortex and across the femur through the nail. Measurements were taken and the appropriate size screw was then inserted.   The TFNA guide was removed and final AP and lateral radiographs were taken which confirmed adequate reduction of the fracture and satisfactory position of the hardware. Copious irrigation was performed once more. The tensor fascia carlee was then closed with an #0 Vicryl. Copious irrigation was performed . #0 and 2-0 Vicryl were used to close the subcutaneous layer. Staples were used for the skin. A sterile layered dressing was placed over the wound. The patient was awakened from anesthesia, transferred to a hospital bed, and taken to the PACU in stable condition. Disposition: The patient was taken to PACU in stable condition. Once stable, he will be transferred to the floor. Orders have been provided to begin physical therapy, weight bear as tolerated Left lower extremity. Patient received a dose of Ancec preoperatively. We will continue this for 24 hours postoperatively for infection prophylaxis. The patient will also be started on asa for DVT prophylaxis. We have consulted  and case management for discharge planning and consulted the PCP for medical management. It should be noted that Dr. Kasey Feliciano was present for the entirety of the procedure.      Electronically signed by Marina Thomas DO on 7/15/2022 at 12:44 PM

## 2022-07-15 NOTE — ANESTHESIA PRE PROCEDURE
Department of Anesthesiology  Preprocedure Note       Name:  Maggie Paz   Age:  80 y.o.  :  10/17/1928                                          MRN:  [id number]         Date:  7/15/2022      Surgeon: Ramón Mckeon): Glenn Gabriel DO    Procedure: Procedure(s):  LEFT INTROCHANTERIC CEPHALOMEDULLARY NAIL  INSERTION    Medications prior to admission:   Prior to Admission medications    Medication Sig Start Date End Date Taking? Authorizing Provider   metoprolol succinate (TOPROL XL) 25 MG extended release tablet Take 25 mg by mouth daily    Historical Provider, MD   acetaminophen (TYLENOL) 500 MG tablet Take 500 mg by mouth daily as needed. Patient not taking: Reported on 2022    Historical Provider, MD       Current medications:    Current Facility-Administered Medications   Medication Dose Route Frequency Provider Last Rate Last Admin    metoprolol succinate (TOPROL XL) extended release tablet 25 mg  25 mg Oral Daily Paz Bah MD   25 mg at 22 0938    dextrose 5 % and 0.9 % sodium chloride infusion   IntraVENous Continuous Paz Bah MD 50 mL/hr at 07/15/22 0601 New Bag at 07/15/22 0601    morphine (PF) injection 2 mg  2 mg IntraVENous Q2H PRN Tarrant Spark, DO   2 mg at 22 3677    Or    morphine injection 4 mg  4 mg IntraVENous Q2H PRN Tarrant Spark, DO        oxyCODONE (ROXICODONE) immediate release tablet 5 mg  5 mg Oral Q4H PRN Tarrant Spark, DO        Or    oxyCODONE (ROXICODONE) immediate release tablet 10 mg  10 mg Oral Q4H PRN Tarrant Spark, DO           Allergies:     Allergies   Allergen Reactions    Biaxin [Clarithromycin] Diarrhea and Rash    Floxin [Ofloxacin] Diarrhea and Rash       Problem List:    Patient Active Problem List   Diagnosis Code    Degenerative arthropathy of spinal facet joint, Multilevel of the cervical spine M47.819    DDD (degenerative disc disease), cervicalC6-C7 M50.30    Spondylosis, lumbosacral M47.817    Lumbar facet arthropathy M47.816    Lumbar spinal stenosis M48.061    Osteoarthrosis, hip M16.9    Knee pain M25.569    Facet syndrome right C2-C6 M47.899     Degenerative osteoarthrosis right C2-C6 M19.90    Protruded cervical disc M50.20    Cervical radiculopathy M54.12    Neural foraminal stenosis of lumbar spine M48.061    Neural foraminal stenosis of cervical spine M48.02    Osteoarthritis of lumbar spine, 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    Hip fracture requiring operative repair, left, open type I or II, initial encounter ([facility] [address] S72.002B       Past Medical History:        Diagnosis Date    Arthritis     Osteoporosis     Paget's disease        Past Surgical History:        Procedure Laterality Date    COLON SURGERY      Diverticuli    JOINT REPLACEMENT      left 2010    KNEE SURGERY      Left x 2    LUNG BIOPSY      NERVE BLOCK  11-    NERVE BLOCK  11 16 2011    right cervical paravertebral facet #2    NERVE BLOCK  11 30 2011    NERVE BLOCK  12 28 2012    cervical epidural #1    NERVE BLOCK      1-9-2013    NERVE BLOCK  1 21 13    cerv ep #3    NERVE BLOCK Right 06 12 2013    cervical paravertebral facet #1    NERVE BLOCK Right 6 24 13    cerv facet #2    NERVE BLOCK Right 07 24 2013    cervical paravertebral facet #3    NERVE BLOCK Right 1 15 14    cerv transforam #1    NERVE BLOCK Right 2/19/2014    right cervical transforaminal nerve block  #2    NERVE BLOCK  03/05/14    transforaminal nerve block right cervical #3    NERVE BLOCK Bilateral 10/29/2014    himanshu lumbar paravertebral facet  #1    NERVE BLOCK Bilateral 11/5/2014    bilateral paravertebral facet  lumbar  #2    NERVE BLOCK Bilateral 11/12/14    paravertebral facet lumbar #3    NERVE BLOCK Right 05/22/2017    transforaminal cervical #1    NERVE BLOCK Right 06/05/2017    transforaminal #2    NERVE BLOCK Bilateral 06/12/2017    lumbar paravertebral facet #1    NERVE BLOCK Bilateral 06/21/2017    Bilatera lumbar paravertebral facet block 32 l3-4 l4-5 l5-s1    NERVE BLOCK Bilateral 09/09/2020    lumbar facet block    NERVE BLOCK Bilateral 9/9/2020    BILATERAL LUMBAR FACET BLOCKS AT L3-4,L4-5,L5-S1 x2 UNDER X-RAY #1 performed by Jeanine Land DO at [facility] Bilateral 09/16/2020    lumbar facet paravertebral    NERVE BLOCK Bilateral 9/16/2020    BILATERAL LUMBAR FACET BLOCKS AT L3-4,L4-5,L5-S1 x2 UNDER X-RAY #2 performed by Jeanine Land DO at [place] [address] Right 09 20 2013    radiofrequency neurolysis medial branch nerve cervical    TOTAL HIP ARTHROPLASTY      Left       Social History:    Social History     Tobacco Use    Smoking status: Never    Smokeless tobacco: Never   Substance Use Topics    Alcohol use: No                                Counseling given: Not Answered      Vital Signs (Current):   Vitals:    07/14/22 2015 07/14/22 2030 07/15/22 0117 07/15/22 0800   BP: (!) 145/66   106/61   Pulse: (!) 108  (!) 121 96   Resp: 20   20   Temp: 97.5 °F (36.4 °C)   99.1 °F (37.3 °C)   TempSrc: Temporal   Axillary   SpO2: (!) 89% 92%  100%   Weight:       Height:                                                  BP Readings from Last 3 Encounters:   07/15/22 106/61   01/28/22 (!) 156/82   11/03/20 130/88       NPO Status:                                                                                 BMI:   Wt Readings from Last 3 Encounters:   07/13/22 91 lb 3.2 oz (41.4 kg)   01/28/22 93 lb (42.2 kg)   11/03/20 107 lb (48.5 kg)     Body mass index is 16.68 kg/m².     CBC:   Lab Results   Component Value Date/Time    WBC 15.7 07/14/2022 10:02 AM    RBC 3.27 07/14/2022 10:02 AM    HGB 10.0 07/14/2022 10:02 AM    HCT 32.3 07/14/2022 10:02 AM    MCV 98.8 07/14/2022 10:02 AM    RDW 15.5 07/14/2022 10:02 AM     07/14/2022 10:02 AM       CMP:   Lab Results   Component Value Date/Time     07/14/2022 10:02 AM    K 4.0 07/14/2022 10:02 AM    CL 106 07/14/2022 10:02 AM    CO2 28 07/14/2022 10:02 AM    BUN 35 07/14/2022 10:02 AM    CREATININE 0.6 07/14/2022 10:02 AM    GFRAA >60 07/14/2022 10:02 AM    LABGLOM >60 07/14/2022 10:02 AM    GLUCOSE 117 07/14/2022 10:02 AM    PROT 5.8 07/14/2022 10:02 AM    CALCIUM 9.2 07/14/2022 10:02 AM    BILITOT 0.8 07/14/2022 10:02 AM    ALKPHOS 81 07/14/2022 10:02 AM    AST 52 07/14/2022 10:02 AM    ALT 20 07/14/2022 10:02 AM       POC Tests: No results for input(s): POCGLU, POCNA, POCK, POCCL, POCBUN, POCHEMO, POCHCT in the last 72 hours. Coags:   Lab Results   Component Value Date/Time    PROTIME 12.3 07/14/2022 01:05 AM    INR 1.1 07/14/2022 01:05 AM    APTT 24.1 07/14/2022 01:05 AM       HCG (If Applicable): No results found for: PREGTESTUR, PREGSERUM, HCG, HCGQUANT     ABGs: No results found for: PHART, PO2ART, THG9SLD, UVT2ABR, BEART, D1SJOMUK     Type & Screen (If Applicable):  No results found for: LABABO, LABRH    Drug/Infectious Status (If Applicable):  No results found for: HIV, HEPCAB    COVID-19 Screening (If Applicable):   Lab Results   Component Value Date/Time    COVID19 Not Detected 09/11/2020 10:44 AM           Anesthesia Evaluation  Patient summary reviewed  Airway: Mallampati: II  TM distance: >3 FB   Neck ROM: full  Mouth opening: > = 3 FB   Dental: normal exam         Pulmonary:Negative Pulmonary ROS breath sounds clear to auscultation                             Cardiovascular:Negative CV ROS            Rhythm: regular  Rate: normal           Beta Blocker:  Dose within 24 Hrs         Neuro/Psych:   (+) neuromuscular disease:,             GI/Hepatic/Renal: Neg GI/Hepatic/Renal ROS            Endo/Other:    (+) : arthritis:., .                 Abdominal:       Abdomen: soft. Vascular: Other Findings:           Anesthesia Plan      general     ASA 3       Induction: intravenous. Anesthetic plan and risks discussed with patient. Plan discussed with CRNA.                     TATY Kerri Acosta MD   7/15/2022      Patient seen and evaluated prior to induction, anesthesia plan of care reviewed.    THOMAS Desai - CRNA

## 2022-07-15 NOTE — PROGRESS NOTES
Speech Language Pathology      NAME:  Carlos A Altman  :  10/17/1928  DATE: 7/15/2022  ROOM:  OR POOL/NONE    Order received. Chart reviewed. Pt unavailable at this time due to:  [] HOLD per RN, Pt   [] Off unit for testing/ procedure    [] With medical staff   [] Declined intervention  [] Sleeping/ Lethargic   [x] Other: patient returned from surgery but still too lethargic for safe PO evaluation. Per nursing xray is suggestive of potential aspiration. Will check status tomorrow am and if alert enough will request Modified Barium Swallow Study order (ie. Videofluoroscopic swallow study order). Will re-attempt later this date as able. Thank you. Closed fracture of right hip, initial encounter [facility]) [S72.001A]  Hip fracture requiring operative repair, left, open type I or II, [facility] encounter [facility] [S72.002B]  Fall on same level from slipping, tripping or stumbling, initial encounter [W01. 0XXA]  Traumatic rhabdomyolysis, initial encounter ([facility] Rafael Monroy. 6XXA]        Adi Mars MSCCC/SLP  Speech Language Pathologist  [id number]

## 2022-07-15 NOTE — H&P
[address] [place] [address]                              HISTORY AND PHYSICAL    PATIENT NAME: Chyna Mcnamara                 :        10/17/1928  MED REC NO:   [id number]                            ROOM:       4418  ACCOUNT NO:   [de-identified]                           ADMIT DATE: 2022  PROVIDER:     Vidal Vargas MD    CHIEF COMPLAINT:  Falling and hip pain. HISTORY OF PRESENT ILLNESS:  The patient is a 79-year-old lady. Apparently, the patient was at home with relative of hers yesterday. The relative went out of town and the patient was left not normal in the  early afternoon hours. Late in the afternoon yesterday, she was found  laying on the floor by another family member. It was unknown how long  she has been on the floor. She complained of right hip pain. She was  unable to ambulate. The patient was brought in to the emergency room  and she was found to have a right intertrochanteric fracture of the neck  of femur. The patient was admitted for further evaluation and  Orthopedic consultation was obtained. The patient is somewhat confused  and she stated that she had a fall about a week ago. She could not  remember events of yesterday. She denied any chest pain or shortness of  breath. No cough. No wheeze. Denied any nausea, vomiting, diarrhea,  or constipation. Denied any urinary complaints. PAST MEDICAL HISTORY:  1. History of Paget's disease of bone. 2.  Generalized osteoarthritis. 3.  History of CVA in the past.  4.  History of emphysema and early idiopathic pulmonary fibrosis,  diagnosed at [facility] in 2016.  5.  Hypertension. 6.  History of _____. 7.  Urge incontinence. 8.  Thyroid goiter. 9.  Unilateral vestibular weakness. PAST SURGICAL HISTORY:  1. Left hip replacement 11 years ago. 2.  History of appendectomy.   3.  History of colon resection for diverticulitis at the age of 72. MEDICATIONS:  Metoprolol Succinate ER 25 mg daily, multivitamin daily,  vitamin D3 2000 units daily. ALLERGIES:  The patient is allergic to CODEINE, [address], BIAXIN, and  most PAIN MEDICATIONS. SOCIAL HISTORY:  The patient is a nonsmoker. No history of alcohol or  drug abuse. FAMILY HISTORY:  Noncontributory. REVIEW OF SYSTEMS:  Unremarkable other than what is stated in the  history of present illness and past medical history. PHYSICAL EXAMINATION:  GENERAL APPEARANCE:  A 80year-old lady lying flat in bed, in no acute  distress, somewhat confused. VITAL SIGNS:  Temperature 97.5, pulse 104 per minute and regular,  respiratory rate 18 per minute, blood pressure 140/62, saturation oxygen  91% on room air. HEENT:  Normocephalic, atraumatic. Pupils are equal and reactive. NECK:  Supple. No lymphadenopathy. No thyromegaly. CHEST:  Essentially clear to auscultation bilaterally. HEART:  Slightly tachycardic. Irregularly irregular. No rub or gallop  noted. ABDOMEN:  Scaphoid, soft, nontender. Bowel sounds are present. No  organomegaly appreciated. EXTREMITIES:  There is no clubbing, no cyanosis. Osteoarthritic changes  of the hands which are severe deformity of the right radius distally. Right lower extremity is short and externally rotated. NEUROLOGICALLY. She is awake and alert, but confused. ASSESSMENT AND PLAN:  1. Right intertrochanteric fracture of the neck of femur. The patient  currently admitted to the hospital.  Orthopedic consultation was  obtained. Plan is for open reduction and internal fixation with  intermedullary nail. We will consult Cardiology for preop evaluation. We will continue on Toprol XL 25 mg p.o. daily. 2.  History of Paget disease of bone. 3.  Hypertension. We will monitor her blood pressure closely. 4.  _____ senile dementia. 5.  History of emphysema and ideoplastic pulmonary fibrosis.   We will  monitor her respiratory status closely. 6.  History of remote CVA. 7.  Generalized osteoarthritis.         Devika Hale MD    D: 07/14/2022 19:03:50       T: 07/14/2022 20:48:54     [email or web address]  Job#: [id number]     Doc#: [id number]    CC:

## 2022-07-15 NOTE — PROGRESS NOTES
Department of Orthopedic Surgery  Resident Progress Note    Patient seen and examined. Complaining of pain in the right hip. Denies chest pain or shortness of breath. Was scheduled for surgery today, however postponed due to OR timing    VITALS:  BP (!) 145/66   Pulse (!) 108   Temp 97.5 °F (36.4 °C) (Temporal)   Resp 20   Ht 5' 2\" (1.575 m)   Wt 91 lb 3.2 oz (41.4 kg)   SpO2 92%   BMI 16.68 kg/m²     General: alert and oriented to person, place and time, well-developed and well-nourished, in no acute distress    MUSCULOSKELETAL:   Right lower extremity:  Skin intact circumferentially  Compartments soft and compressible  +PF/DF/EHL  +2/4 DP & PT pulses, Brisk Cap refill, Toes warm and perfused  Distal sensation grossly intact to Peroneals, Sural, Saphenous, and tibial nrs    CBC:   Lab Results   Component Value Date/Time    WBC 15.7 07/14/2022 10:02 AM    HGB 10.0 07/14/2022 10:02 AM    HCT 32.3 07/14/2022 10:02 AM     07/14/2022 10:02 AM     PT/INR:    Lab Results   Component Value Date/Time    PROTIME 12.3 07/14/2022 01:05 AM    INR 1.1 07/14/2022 01:05 AM         ASSESSMENT  Closed right intertrochanteric hip fracture    PLAN      Nonweightbearing right lower extremity  OR ttomorrow for ORIF with cephalomedullary nail  NPO  PT/OT when appropriate  Pain Control: IV and PO  I was present with the resident during the history and exam. I discussed the case with the resident and agree with the findings and plan as documented in the resident's note.

## 2022-07-15 NOTE — PROGRESS NOTES
Galeas catheter inserted on midnight shift. No urine noted in bag on assessment, small amount of urine noted on pad under patient. Patient c/o feeling as if she may have to void, bladder scan done with 244 cc showing on scan. After repositioning patient in bed and approx 5 cc urine drained into bag so galeas not removed.

## 2022-07-15 NOTE — DISCHARGE INSTRUCTIONS
Orthopedics Discharge Instructions   Weight bearing Status - Weight bearing as tolerated - Operative Extremity  Pain medication Per Prescription  Ice to operative/injured site for 15-30 minutes of each hour for next 3-5 days           Elevate operative/injured limb on 2 pillows at home  Continue DVT Prophylaxis as Prescribed  OK to remove Aquacel dressing on post op day seven. OK to shower on post op day 2. No baths or lotions. Follow Up in Office in 2 weeks with Dr. Shreya Perez     Call the office at [phone] for directions or with any questions. Watch for these significant complications. Call physician if they or any other problems occur:  Fever over 101°, redness, swelling or warmth at the operative site  Unrelieved nausea                          Foul smelling or cloudy drainage at the operative site       Unrelieved pain                   Blood soaked dressing. (Some oozing may be normal)                Numb, pale, blue, cold or tingling extremity     No future appointments.

## 2022-07-15 NOTE — PROGRESS NOTES
Department of Internal Medicine  General Internal Medicine  Attending Progress Note      SUBJECTIVE:     Patient seen and examined this morning  Awake somewhat confused no distress  Attempt for po feeding last night resulted in possible aspiration  Requiring oxygen at 2 liters per minute  Plan is for surgery later today      Medications    Current Facility-Administered Medications: ceFAZolin (ANCEF) 2,000 mg in sterile water 20 mL IV syringe, 2,000 mg, IntraVENous, On Call to OR  sodium chloride flush 0.9 % injection 5-40 mL, 5-40 mL, IntraVENous, 2 times per day  sodium chloride flush 0.9 % injection 5-40 mL, 5-40 mL, IntraVENous, PRN  0.9 % sodium chloride infusion, , IntraVENous, PRN  ondansetron (ZOFRAN) injection 4 mg, 4 mg, IntraVENous, Once PRN  fentaNYL (SUBLIMAZE) injection 25 mcg, 25 mcg, IntraVENous, Q5 Min PRN  fentaNYL (SUBLIMAZE) injection 50 mcg, 50 mcg, IntraVENous, Q5 Min PRN  meperidine (DEMEROL) injection 12.5 mg, 12.5 mg, IntraVENous, Q5 Min PRN  ceFAZolin (ANCEF) 1,000 mg in sterile water 10 mL IV syringe, 1,000 mg, IntraVENous, Q8H  aspirin chewable tablet 81 mg, 81 mg, Oral, BID WC  metoprolol succinate (TOPROL XL) extended release tablet 25 mg, 25 mg, Oral, Daily  dextrose 5 % and 0.9 % sodium chloride infusion, , IntraVENous, Continuous  morphine (PF) injection 2 mg, 2 mg, IntraVENous, Q2H PRN **OR** morphine injection 4 mg, 4 mg, IntraVENous, Q2H PRN  oxyCODONE (ROXICODONE) immediate release tablet 5 mg, 5 mg, Oral, Q4H PRN **OR** oxyCODONE (ROXICODONE) immediate release tablet 10 mg, 10 mg, Oral, Q4H PRN    Physical    VITALS:  BP (!) 111/56   Pulse 89   Temp 97.6 °F (36.4 °C) (Infrared)   Resp 20   Ht 5' 2\" (1.575 m)   Wt 91 lb 3.2 oz (41.4 kg)   SpO2 94%   BMI 16.68 kg/m²   TEMPERATURE:  Current - Temp: 97.6 °F (36.4 °C);  Max - Temp  Av.7 °F (36.5 °C)  Min: 97.2 °F (36.2 °C)  Max: 99.1 °F (37.3 °C)  RESPIRATIONS RANGE: Resp  Av.8  Min: 17  Max: 38  PULSE RANGE: Pulse Av.6  Min: 89  Max: 121  BLOOD PRESSURE RANGE:  Systolic (39EJC), EIP:727 , Min:96 , TNQ:097   ; Diastolic (67XOY), AVR:01, Min:51, Max:92    PULSE OXIMETRY RANGE: SpO2  Av.1 %  Min: 83 %  Max: 100 %  24HR INTAKE/OUTPUT:    Intake/Output Summary (Last 24 hours) at 7/15/2022 1904  Last data filed at 7/15/2022 1854  Gross per 24 hour   Intake 2850.96 ml   Output 100 ml   Net 2750.96 ml       CONSTITUTIONAL: Awake, no distress, appears as stated age. Frail. HEENT: Normocephalic atraumatic. No icterus, no pallor  NECK: Supple, no JVD , no lymphadenopathy, no bruits, no thyromegaly  LUNGS: Clear to auscultation bilaterally. Diminished over bases  CARDIOVASCULAR: Regularly irregular, no murmur ,rub or gallop. ABDOMEN: Soft, nontender, bowel sounds are positive, no organomegaly appreciated. NEUROLOGIC: Awake, alert, oriented x 1, no focal deficits noted. EXT: No edema, clubbing, or cyanosis.     CBC with Differential:    Lab Results   Component Value Date/Time    WBC 15.7 2022 10:02 AM    RBC 3.27 2022 10:02 AM    HGB 10.0 2022 10:02 AM    HCT 32.3 2022 10:02 AM     2022 10:02 AM    MCV 98.8 2022 10:02 AM    MCH 30.6 2022 10:02 AM    MCHC 31.0 2022 10:02 AM    RDW 15.5 2022 10:02 AM    SEGSPCT 73 2013 02:45 PM    LYMPHOPCT 4.0 2022 07:52 PM    MONOPCT 9.3 2022 07:52 PM    BASOPCT 0.2 2022 07:52 PM    MONOSABS 1.63 2022 07:52 PM    LYMPHSABS 0.71 2022 07:52 PM    EOSABS 0.00 2022 07:52 PM    BASOSABS 0.03 2022 07:52 PM     CMP:    Lab Results   Component Value Date/Time     2022 10:02 AM    K 4.0 2022 10:02 AM     2022 10:02 AM    CO2 28 2022 10:02 AM    BUN 35 2022 10:02 AM    CREATININE 0.6 2022 10:02 AM    GFRAA >60 2022 10:02 AM    LABGLOM >60 2022 10:02 AM    GLUCOSE 117 2022 10:02 AM    PROT 5.8 2022 10:02 AM    LABALBU 3.4 07/14/2022 10:02 AM    CALCIUM 9.2 07/14/2022 10:02 AM    BILITOT 0.8 07/14/2022 10:02 AM    ALKPHOS 81 07/14/2022 10:02 AM    AST 52 07/14/2022 10:02 AM    ALT 20 07/14/2022 10:02 AM       Assessment and plan    1. S/P Fall. 2.Rt intertrochanteric fracture of the neck of femur, Orthopedics following and plan is for ORIF later today  Pain controlled. 3.Sinus tachycardia with PAC's On Toprol XL 25 mg daily  4. Dysphagia and possible aspiration, NPO for now, iv fluids, speech therapy to evaluate swallowing  Check CXR   5. Paget's disease of bone. 6.Generalized osteoarthritis. 7.Leukocytosis, urine with pyuria and possible aspiration pneumonia  Consult ID  Attempted to call Daughter earlier this morning to update , and left message to call me back.     Colonel Ena MD, MD.  7:04 PM  7/15/2022

## 2022-07-16 NOTE — PROGRESS NOTES
Room #: 9373/6689-23  Date: 2022       Patient Name: Jeremias Bruno  : 10/17/1928      MRN: [id number]     Patient unavailable for physical therapy eval due to off of floor for testing. Will attempt PT evaluation at a later time.       Reed Guan, PT

## 2022-07-16 NOTE — PROGRESS NOTES
Department of Internal Medicine  General Internal Medicine  Attending Progress Note      SUBJECTIVE:    Patient seen and examined this morning  Awake , somewhat confused no distress  No complaints. Medications    Current Facility-Administered Medications: sodium chloride flush 0.9 % injection 5-40 mL, 5-40 mL, IntraVENous, 2 times per day  sodium chloride flush 0.9 % injection 5-40 mL, 5-40 mL, IntraVENous, PRN  0.9 % sodium chloride infusion, , IntraVENous, PRN  fentaNYL (SUBLIMAZE) injection 25 mcg, 25 mcg, IntraVENous, Q5 Min PRN  fentaNYL (SUBLIMAZE) injection 50 mcg, 50 mcg, IntraVENous, Q5 Min PRN  meperidine (DEMEROL) injection 12.5 mg, 12.5 mg, IntraVENous, Q5 Min PRN  ceFAZolin (ANCEF) 1,000 mg in sterile water 10 mL IV syringe, 1,000 mg, IntraVENous, Q8H  aspirin chewable tablet 81 mg, 81 mg, Oral, BID WC  metoprolol succinate (TOPROL XL) extended release tablet 25 mg, 25 mg, Oral, Daily  dextrose 5 % and 0.9 % sodium chloride infusion, , IntraVENous, Continuous  morphine (PF) injection 2 mg, 2 mg, IntraVENous, Q2H PRN **OR** morphine injection 4 mg, 4 mg, IntraVENous, Q2H PRN  oxyCODONE (ROXICODONE) immediate release tablet 5 mg, 5 mg, Oral, Q4H PRN **OR** oxyCODONE (ROXICODONE) immediate release tablet 10 mg, 10 mg, Oral, Q4H PRN    Physical    VITALS:  BP (!) 133/90   Pulse 63   Temp 98.4 °F (36.9 °C)   Resp (!) 32   Ht 5' 2\" (1.575 m)   Wt 91 lb 3.2 oz (41.4 kg)   SpO2 94%   BMI 16.68 kg/m²   TEMPERATURE:  Current - Temp: 98.4 °F (36.9 °C);  Max - Temp  Av.6 °F (36.4 °C)  Min: 97.2 °F (36.2 °C)  Max: 98.4 °F (36.9 °C)  RESPIRATIONS RANGE: Resp  Av.2  Min: 17  Max: 38  PULSE RANGE: Pulse  Av.9  Min: 63  Max: 116  BLOOD PRESSURE RANGE:  Systolic (82ZEA), RTR:927 , Min:96 , VMQ:988   ; Diastolic (28NYK), LMX:85, Min:51, Max:92    PULSE OXIMETRY RANGE: SpO2  Av.4 %  Min: 83 %  Max: 99 %  24HR INTAKE/OUTPUT:    Intake/Output Summary (Last 24 hours) at 2022 1045  Last data filed at 7/16/2022 0609  Gross per 24 hour   Intake 2850.96 ml   Output 100 ml   Net 2750.96 ml       CONSTITUTIONAL: Awake, no distress, appears as stated age. Frail. HEENT: Normocephalic atraumatic. No icterus, no pallor  NECK: Supple, no JVD , no lymphadenopathy, no bruits, no thyromegaly  LUNGS: Clear to auscultation bilaterally. Diminished over bases  CARDIOVASCULAR: Regularly irregular, no murmur ,rub or gallop. ABDOMEN: Soft, nontender, bowel sounds are positive, no organomegaly appreciated. NEUROLOGIC: Awake, alert, oriented to place and partially in time, no focal deficits noted. EXT: No edema, clubbing, or cyanosis. CBC with Differential:    Lab Results   Component Value Date/Time    WBC 8.1 07/16/2022 07:11 AM    RBC 2.72 07/16/2022 07:11 AM    HGB 8.3 07/16/2022 07:11 AM    HCT 26.8 07/16/2022 07:11 AM     07/16/2022 07:11 AM    MCV 98.5 07/16/2022 07:11 AM    MCH 30.5 07/16/2022 07:11 AM    MCHC 31.0 07/16/2022 07:11 AM    RDW 15.5 07/16/2022 07:11 AM    SEGSPCT 73 02/04/2013 02:45 PM    LYMPHOPCT 7.0 07/16/2022 07:11 AM    MONOPCT 0.9 07/16/2022 07:11 AM    BASOPCT 0.0 07/16/2022 07:11 AM    MONOSABS 0.08 07/16/2022 07:11 AM    LYMPHSABS 0.57 07/16/2022 07:11 AM    EOSABS 0.00 07/16/2022 07:11 AM    BASOSABS 0.00 07/16/2022 07:11 AM     CMP:    Lab Results   Component Value Date/Time     07/16/2022 07:11 AM    K 4.6 07/16/2022 07:11 AM     07/16/2022 07:11 AM    CO2 27 07/16/2022 07:11 AM    BUN 47 07/16/2022 07:11 AM    CREATININE 0.7 07/16/2022 07:11 AM    GFRAA >60 07/16/2022 07:11 AM    LABGLOM >60 07/16/2022 07:11 AM    GLUCOSE 138 07/16/2022 07:11 AM    PROT 5.4 07/16/2022 07:11 AM    LABALBU 2.8 07/16/2022 07:11 AM    CALCIUM 8.8 07/16/2022 07:11 AM    BILITOT 0.8 07/16/2022 07:11 AM    ALKPHOS 57 07/16/2022 07:11 AM    AST 32 07/16/2022 07:11 AM    ALT 18 07/16/2022 07:11 AM       Assessment and plan    1. S/P Fall.   2.Rt intertrochanteric fracture of the neck of femur, Orthopedics following , S/P ORIF with intramedullary nail 7/15th  Pain controlled. PT/OT, awaiting rehab placement. 3.Sinus tachycardia with PAC's On Toprol XL 25 mg daily  4. Dysphagia and possible aspiration, NPO for now, iv fluids, speech therapy to evaluate swallowing  B/L opacities on CXR ID consulted ,On Ancef iv post op. 5.Paget's disease of bone. 6.Generalized osteoarthritis. 7.Leukocytosis, urine with pyuria and possible aspiration pneumonia, White cell count down to normal.  ID consulted.     Gabby Jasso MD, MD.  10:45 AM  7/16/2022

## 2022-07-16 NOTE — PROGRESS NOTES
Speech Language Pathology  SPEECH LANGUAGE PATHOLOGY  DAILY PROGRESS NOTE        PATIENT NAME:  Jolanta Ochoa      :  10/17/1928          TODAY'S DATE:  2022 ROOM:  [address] [place] [address]    Speech Pathologist (SLP) completed education with the patient/family regarding type of swallowing impairment. Reviewed current solid/liquid consistency diet recommendations and discussed compensatory strategies to ensure safe PO intake. Reviewed aspiration precautions. Encouraged patient and/or family to engage SLP in unstructured Q&A session relative to identified deficit areas; indicated understanding of all information provided via satisfactory verbal response. Pt presents for MBSS to diagnostically evaluate the oropharyngeal swallow d/t family and staff reports of noted coughing with PO intake. The family reports a steady weight loss at home with unknown etiology and the granddaughter reports pt has progressively been coughing more with PO intake. The family reports recent progression in pt's altered mental status. MBSS was positive for aspiration of thin and mildly thick nectar liquids with an inconsistent overt cough response. Pt's cough was weak and she was therefore unable to clear aspirated material despite effort. Pt was then presented with a 1/2 tsp puree bolus of which she was unable to clear from the valleculae despite 10+ swallows. SLP recommends pt remain NPO at this time with medications presented per nursing discretion. An alternative source of nutrition should be considered at this time. SLP will provide ongoing dysphagia management to rehabilitate the oropharyngeal swallow, although there is limited rehab progress at this time.      Matilda Li, SLP  SP [id number]    CPT code(s) 93155  dysphagia tx  Total minutes :  30 minutes

## 2022-07-16 NOTE — CONSULTS
[facility]  Consult Note    [address] [facility] [address]  [place], [address]  Phone [phone]   [phone]      Admit Date: 2022  6:59 PM  Pt Name: Leia Simon  MRN: [id number]  : 10/17/1928  Reason for Consult:    Chief Complaint   Patient presents with    Fall     Requesting Physician:  Magui Gandhi MD  PCP: Magui Gandhi MD  History Obtained From:  patient, chart   ID consulted for Closed fracture of right hip, initial encounter [facility]) [S72.001A]  Hip fracture requiring operative repair, left, open type I or II, initial encounter ([facility] [address] [S72.002B]  Fall on same level from slipping, tripping or stumbling, initial encounter [W01. 0XXA]  Traumatic rhabdomyolysis, initial encounter ([facility] [address] Susanna Franklin. 6XXA]  on hospital day 3  CHIEF COMPLAINT       Chief Complaint   Patient presents with    Fall     HISTORYOF PRESENT ILLNESS   Leia Simon is a 80 y.o. female who presents with   has a past medical history of Arthritis, Osteoporosis, and Paget's disease.    ED TRIAGEVITALS  BP: 124/64, Temp: 98 °F (36.7 °C), Heart Rate: 73, Resp: 18, SpO2: 97 %  HPI  Pt presented to ER with fall  Found at home   Cxry nap   Ct head nap   Ct cervical nap djd    Wbc17.6 hgb10.7 qpq501 cr0.6 [id number]  Currrently on     ceFAZolin (ANCEF) 1,000 mg in sterile water 10 mL IV syringe, Q8H    ID was asked to see for infection management leukocytosis, pyuria poss aspiration   Pt in bed c/o dry nouth no f/c/n/v/d  Not much pain   REVIEW OF SYSTEMS     CONSTITUTIONAL:   No fever, chills, weight loss  ALLERGIES:    No urticaria, hay fever,    EYES:     No blurry vision, loss of vision, eye pain  ENT:      No hearing loss, sore throat  CARDIOVASCULAR:  No chest pain or palpitations  RESPIRATORY:   No cough, sob  ENDOCRINE:    No increase thirst, urination   HEME-LYMPH:   No easy bruising or bleeding  GI:     No nausea, vomiting or diarrhea  :     No urinary complaints  NEURO: No seizures, stroke, HA  MUSCULOSKELETAL:  No muscle aches or pain, no joint pain  SKIN:     No rash or itch  PSYCH:    No depression or anxiety    Medications Prior to Admission: [DISCONTINUED] Naproxen Sodium (ALEVE PO), Take by mouth (Patient not taking: Reported on 7/14/2022)  [DISCONTINUED] cephALEXin (KEFLEX) 500 MG capsule, Take 1 capsule by mouth 4 times daily (Patient not taking: Reported on 7/14/2022)  [DISCONTINUED] ondansetron (ZOFRAN ODT) 4 MG disintegrating tablet, Take 1 tablet by mouth every 8 hours as needed for Nausea or Vomiting (Patient not taking: Reported on 7/14/2022)  metoprolol succinate (TOPROL XL) 25 MG extended release tablet, Take 25 mg by mouth daily  [DISCONTINUED] gabapentin (NEURONTIN) 100 MG capsule, Take 100 mg by mouth 3 times daily. (Patient not taking: Reported on 7/14/2022)  [DISCONTINUED] amlodipine (NORVASC) 2.5 MG tablet, Take 2.5 mg by mouth nightly. (Patient not taking: Reported on 7/14/2022)  acetaminophen (TYLENOL) 500 MG tablet, Take 500 mg by mouth daily as needed.    (Patient not taking: Reported on 7/14/2022)  CURRENT MEDICATIONS     Current Facility-Administered Medications:     sodium chloride flush 0.9 % injection 5-40 mL, 5-40 mL, IntraVENous, 2 times per day, Micki Monzon MD    sodium chloride flush 0.9 % injection 5-40 mL, 5-40 mL, IntraVENous, PRN, Micki Monzon MD    0.9 % sodium chloride infusion, , IntraVENous, PRN, Micki Monzon MD    fentaNYL (SUBLIMAZE) injection 25 mcg, 25 mcg, IntraVENous, Q5 Min PRN, Micki Monzon MD    fentaNYL (SUBLIMAZE) injection 50 mcg, 50 mcg, IntraVENous, Q5 Min PRN, Micki Monzon MD    meperidine (DEMEROL) injection 12.5 mg, 12.5 mg, IntraVENous, Q5 Min PRN, Micki Monzon MD, 12.5 mg at 07/15/22 1329    ceFAZolin (ANCEF) 1,000 mg in sterile water 10 mL IV syringe, 1,000 mg, IntraVENous, Q8H, Get Velazquez DO, 1,000 mg at 07/16/22 0156    aspirin chewable tablet 81 mg, 81 mg, Oral, BID LG, Marlena Ellis DO    metoprolol succinate (TOPROL XL) extended release tablet 25 mg, 25 mg, Oral, Daily, Colonel Ena MD, 25 mg at 07/14/22 0938    dextrose 5 % and 0.9 % sodium chloride infusion, , IntraVENous, Continuous, Colonel Ena MD, Last Rate: 50 mL/hr at 07/16/22 0553, New Bag at 07/16/22 0553    morphine (PF) injection 2 mg, 2 mg, IntraVENous, Q2H PRN, 2 mg at 07/14/22 0939 **OR** morphine injection 4 mg, 4 mg, IntraVENous, Q2H PRN, Chrystal Kennedy DO    oxyCODONE (ROXICODONE) immediate release tablet 5 mg, 5 mg, Oral, Q4H PRN **OR** oxyCODONE (ROXICODONE) immediate release tablet 10 mg, 10 mg, Oral, Q4H PRN, Chrystal Kennedy DO  ALLERGIES     Biaxin [clarithromycin] and Floxin [ofloxacin]  Immunization History   Administered Date(s) Administered    COVID-19, PFIZER PURPLE top, DILUTE for use, (age 15 y+), 30mcg/0.3mL 01/20/2021, 02/10/2021, 11/17/2021      Internal Administration   First Dose COVID-19, PFIZER PURPLE top, DILUTE for use, (age 15 y+), 30mcg/0.3mL  01/20/2021   Second Dose COVID-19, PFIZER PURPLE top, DILUTE for use, (age 15 y+), 30mcg/0.3mL   02/10/2021       Last COVID Lab SARS-CoV-2 (no units)   Date Value   09/11/2020 Not Detected            PAST MEDICAL HISTORY     Past Medical History:   Diagnosis Date    Arthritis     Osteoporosis     Paget's disease      SURGICAL HISTORY       Past Surgical History:   Procedure Laterality Date    COLON SURGERY      Diverticuli    JOINT REPLACEMENT      left 2010    KNEE SURGERY      Left x 2    LUNG BIOPSY      NERVE BLOCK  11-    NERVE BLOCK  11 16 2011    right cervical paravertebral facet #2    NERVE BLOCK  11 30 2011    NERVE BLOCK  12 28 2012    cervical epidural #1    NERVE BLOCK      1-9-2013    NERVE BLOCK  1 21 13    cerv ep #3    NERVE BLOCK Right 06 12 2013    cervical paravertebral facet #1    NERVE BLOCK Right 6 24 13    cerv facet #2    NERVE BLOCK Right 07 24 2013    cervical paravertebral facet #3 NERVE BLOCK Right 1 15 14    cerv transforam #1    NERVE BLOCK Right 2/19/2014    right cervical transforaminal nerve block  #2    NERVE BLOCK  03/05/14    transforaminal nerve block right cervical #3    NERVE BLOCK Bilateral 10/29/2014    himanshu lumbar paravertebral facet  #1    NERVE BLOCK Bilateral 11/5/2014    bilateral paravertebral facet  lumbar  #2    NERVE BLOCK Bilateral 11/12/14    paravertebral facet lumbar #3    NERVE BLOCK Right 05/22/2017    transforaminal cervical #1    NERVE BLOCK Right 06/05/2017    transforaminal #2    NERVE BLOCK Bilateral 06/12/2017    lumbar paravertebral facet #1    NERVE BLOCK Bilateral 06/21/2017    Bilatera lumbar paravertebral facet block 32 l3-4 l4-5 l5-s1    NERVE BLOCK Bilateral 09/09/2020    lumbar facet block    NERVE BLOCK Bilateral 9/9/2020    BILATERAL LUMBAR FACET BLOCKS AT L3-4,L4-5,L5-S1 x2 UNDER X-RAY #1 performed by Re Jeong DO at [address] Bilateral 09/16/2020    lumbar facet paravertebral    NERVE BLOCK Bilateral 9/16/2020    BILATERAL LUMBAR FACET BLOCKS AT L3-4,L4-5,L5-S1 x2 UNDER X-RAY #2 performed by Re Jeong DO at [address] Right 09 20 2013    radiofrequency neurolysis medial branch nerve cervical    TOTAL HIP ARTHROPLASTY      Left     FAMILY HISTORY     No family history on file. SOCIAL HISTORY       Social History     Socioeconomic History    Marital status:     Tobacco Use    Smoking status: Never    Smokeless tobacco: Never   Substance and Sexual Activity    Alcohol use: No          PHYSICAL EXAM       ED Triage Vitals   BP Temp Temp Source Heart Rate Resp SpO2 Height Weight   07/13/22 1908 07/13/22 1908 07/13/22 2330 07/13/22 1914 07/13/22 1914 07/13/22 1925 07/13/22 2132 07/13/22 2132   (!) 175/72 98.4 °F (36.9 °C) Oral (!) 103 16 94 % 5' 2\" (1.575 m) 98 lb (44.5 kg)     Vitals:    Vitals:    07/15/22 1437 07/15/22 1649 07/15/22 2045 07/16/22 0824   BP: 111/67 (!) 111/56 (!) 107/57 124/64   Pulse: 98 89 (!) 104 73   Resp: 20 20 18 18   Temp: 97.5 °F (36.4 °C) 97.6 °F (36.4 °C) 97.5 °F (36.4 °C) 98 °F (36.7 °C)   TempSrc: Axillary Infrared Axillary Axillary   SpO2: 99% 94% 94% 97%   Weight:       Height:         Physical Exam   Constitutional/General: Alert and oriented, NAD thin  Head: NC/AT  Eyes: PERRL, EOMI  Mouth: Normal mucosa, no thrush   Neck: Supple, full ROM,    Pulmonary: Lungs clear to auscultation bilaterally. Not in respiratory distress on o2  Cardiovascular:  Regular rate and rhythm, no murmurs, gallops, or rubs. Abdomen: Soft, + BS. No distension. Nontender. Extremities: Moves all extremities x 4. Warm and well perfused right hip edema dressed  Pulses:  Distal pulses intact  Skin: Warm and dry without rash  Neurologic:    No focal deficits  Psych: Normal Affect     DIAGNOSTIC RESULTS   RADIOLOGY:   XR HIP RIGHT (1 VIEW)    Result Date: 7/14/2022  EXAMINATION: ONE XRAY VIEW OF THE RIGHT HIP 7/14/2022 12:04 am COMPARISON: None. HISTORY: ORDERING SYSTEM PROVIDED HISTORY: Traction view TECHNOLOGIST PROVIDED HISTORY: Reason for exam:->Traction view FINDINGS: There is evidence of a previous left hip arthroplasty. There is a intertrochanteric fracture of proximal right femur. No additional acute findings. Intertrochanteric fracture of the proximal right femur. XR HIP RIGHT (2-3 VIEWS)    Result Date: 7/13/2022  EXAMINATION: TWO XRAY VIEWS OF THE RIGHT HIP 7/13/2022 8:28 pm COMPARISON: None. HISTORY: ORDERING SYSTEM PROVIDED HISTORY: Fall, deformity, suspect hip fracture TECHNOLOGIST PROVIDED HISTORY: Reason for exam:->Fall, deformity, suspect hip fracture FINDINGS: Displaced right intertrochanteric fracture with varus deformity. There is normal alignment. No acute joint abnormality. No focal osseous lesion. No focal soft tissue abnormality. Displaced right intertrochanteric fracture with varus deformity. .     XR FEMUR RIGHT (MIN 2 VIEWS)    Result Date: 7/14/2022  EXAMINATION: XRAY VIEWS OF THE RIGHT FEMUR 7/14/2022 12:04 am COMPARISON: None. HISTORY: ORDERING SYSTEM PROVIDED HISTORY: R intertrochanteric hip fracture TECHNOLOGIST PROVIDED HISTORY: Reason for exam:->R intertrochanteric hip fracture FINDINGS: There is a intertrochanteric fracture of the proximal right femur. No additional acute fractures are identified on this exam.     Intertrochanteric fracture of the proximal right femur     CT HEAD WO CONTRAST    Result Date: 7/13/2022  EXAMINATION: CT OF THE HEAD WITHOUT CONTRAST  7/13/2022 8:11 pm TECHNIQUE: CT of the head was performed without the administration of intravenous contrast. Automated exposure control, iterative reconstruction, and/or weight based adjustment of the mA/kV was utilized to reduce the radiation dose to as low as reasonably achievable. COMPARISON: None. HISTORY: ORDERING SYSTEM PROVIDED HISTORY: fall TECHNOLOGIST PROVIDED HISTORY: Has a \"code stroke\" or \"stroke alert\" been called? ->No Reason for exam:->fall Decision Support Exception - unselect if not a suspected or confirmed emergency medical condition->Emergency Medical Condition (MA) FINDINGS: BRAIN/VENTRICLES: There is no acute intracranial hemorrhage, mass effect or midline shift. No abnormal extra-axial fluid collection. The gray-white differentiation is maintained without evidence of an acute infarct. There is no evidence of hydrocephalus. Diffuse volume loss. ORBITS: The visualized portion of the orbits demonstrate no acute abnormality. SINUSES: The visualized paranasal sinuses and mastoid air cells demonstrate no acute abnormality. SOFT TISSUES/SKULL:  No acute abnormality of the visualized skull or soft tissues. No acute intracranial abnormality.      CT CERVICAL SPINE WO CONTRAST    Result Date: 7/13/2022  EXAMINATION: CT OF THE CERVICAL SPINE WITHOUT CONTRAST 7/13/2022 8:11 pm TECHNIQUE: CT of the cervical spine was performed without the administration of intravenous contrast. Multiplanar reformatted images are provided for review. Automated exposure control, iterative reconstruction, and/or weight based adjustment of the mA/kV was utilized to reduce the radiation dose to as low as reasonably achievable. COMPARISON: None. HISTORY: ORDERING SYSTEM PROVIDED HISTORY: fall TECHNOLOGIST PROVIDED HISTORY: Reason for exam:->fall Decision Support Exception - unselect if not a suspected or confirmed emergency medical condition->Emergency Medical Condition (MA) FINDINGS: BONES/ALIGNMENT: There is no acute fracture or traumatic malalignment. DEGENERATIVE CHANGES: Multilevel degenerative changes. SOFT TISSUES: There is no prevertebral soft tissue swelling. No acute abnormality of the cervical spine. Multilevel degenerative changes. XR CHEST PORTABLE    Result Date: 7/15/2022  EXAMINATION: ONE XRAY VIEW OF THE CHEST 7/15/2022 9:18 am COMPARISON: 07/13/2022 HISTORY: ORDERING SYSTEM PROVIDED HISTORY: aspiration TECHNOLOGIST PROVIDED HISTORY: Reason for exam:->aspiration FINDINGS: Pulmonary opacities are seen in the right parahilar region, as well as the left mid and lower lung and appear infectious/inflammatory small left pleural effusion. No pneumothorax is seen. The visualized bones appear demineralized but are otherwise intact. Bilateral pulmonary opacities, left greater than right, likely infectious/inflammatory. Aspiration pneumonitis would also be in the differential diagnosis. XR CHEST 1 VIEW    Result Date: 7/13/2022  EXAMINATION: ONE XRAY VIEW OF THE CHEST 7/13/2022 8:28 pm COMPARISON: 01/30/2007 HISTORY: ORDERING SYSTEM PROVIDED HISTORY: pre op TECHNOLOGIST PROVIDED HISTORY: Reason for exam:->pre op FINDINGS: The lungs are without acute focal process. There is no effusion or pneumothorax. The cardiomediastinal silhouette is without acute process. The osseous structures are without acute process. No acute process.      Fluoro For Surgical Procedures    Result Date: 7/15/2022  EXAMINATION: SPOT FLUOROSCOPIC IMAGES 7/15/2022 9:09 am TECHNIQUE: Fluoroscopy was provided by the radiology department for procedure. Radiologist was not present during examination. FLUOROSCOPY DOSE AND TYPE OR TIME AND EXPOSURES: Fluoroscopy time equals 43 seconds. Total dose equals 4.29 mGy COMPARISON: None HISTORY: ORDERING SYSTEM PROVIDED HISTORY: Closed fracture of left hip, initial encounter [facility]) TECHNOLOGIST PROVIDED HISTORY: Reason for exam:->LEFT INTROCHANTERIC CEPHALOMEDULLARY NAIL  INSERTION Intraprocedural imaging. FINDINGS: 4 spot images of the right hip were obtained. Intraprocedural fluoroscopic spot images as above. See separate procedure report for more information. LABS  Recent Labs     07/13/22 1952 07/14/22 1002 07/16/22  0711   WBC 17.6* 15.7* 8.1   HGB 10.7* 10.0* 8.3*   HCT 33.5* 32.3* 26.8*   MCV 93.3 98.8 98.5    136 175     Recent Labs     07/13/22 1952 07/14/22 1002 07/16/22  0711    142 146   K 5.0 4.0 4.6    106 112*   CO2 28 28 27   BUN 37* 35* 47*   CREATININE 0.6 0.6 0.7   GFRAA >60 >60 >60   LABGLOM >60 >60 >60   GLUCOSE 135* 117* 138*   PROT 6.6 5.8* 5.4*   LABALBU 3.7 3.4* 2.8*   CALCIUM 9.7 9.2 8.8   BILITOT 1.1 0.8 0.8   ALKPHOS 95 81 57   AST 45* 52* 32*   ALT 18 20 18     No results for input(s): PROCAL in the last 72 hours.   No results found for: CRP  Lab Results   Component Value Date    SEDRATE 12 02/04/2013     No results found for: [id number]  Lab Results   Component Value Date/Time    COVID19 Not Detected 09/11/2020 10:44 AM          Lab Results   Component Value Date/Time    COVID19 Not Detected 09/11/2020 10:44 AM     COVID-19/MATT-COV2 LABS  Recent Labs     07/13/22 1952 07/14/22  0105 07/14/22 1002 07/16/22  0711   INR  --  1.1  --   --    PROTIME  --  12.3  --   --    AST 45*  --  52* 32*   ALT 18  --  20 18           MICROBIOLOGY:     Cultures :          FINAL IMPRESSION    Patient is a 80 y.o. female who presented with   Chief Complaint   Patient presents with    Fall    and admitted for Closed fracture of right hip, initial encounter [facility]) [S72.001A]  Hip fracture requiring operative repair, left, open type I or II, initial encounter ([facility] [address] [S72.002B]  Fall on same level from slipping, tripping or stumbling, initial encounter [W01. 0XXA]  [facility]dom[facility]olysis[facility] Cutting. 6XXA]     Leukocytosis  s/p left hip fracture   Procedure(s): 7/15   RIGHT INTROCHANTERIC CEPHALOMEDULLARY NAIL  INSERTION  UTI     -bladder scan 375   -check cx  Start ceftraixone  ceFAZolin (ANCEF) 1,000 mg in sterile water 10 mL IV syringe, Q8H             Imaging and labs were reviewed per medical records and any ID pertinent labs were addressed with the patient. The patient/FAMILY  was educated about the diagnosis, prognosis, indications, risks and benefits of treatment. An opportunity to ask questions was given to the patient/FAMILY and questions were answered. Thank you for involving me in the care of Thanh Schumacher. Please do not hesitate to call for any questions or concerns.          Electronically signed by Marycruz Gregorio MD on 7/16/2022 at 8:56 AM

## 2022-07-16 NOTE — PROGRESS NOTES
Speech Language Pathology  SPEECH/LANGUAGE PATHOLOGY  VIDEOFLUOROSCOPIC STUDY OF SWALLOWING (MBS)   and PLAN OF CARE    PATIENT NAME:  Abdirashid Rodriguez  (female)     MRN:  [id number]    :  10/17/1928  (80 y.o.)  STATUS:  Inpatient: Room 0048/4031-84    TODAY'S DATE:  2022  REFERRING PROVIDER:   Dr. Leonides Reid: SLP eval and treat  Date of order:  7-15-22   REASON FOR REFERRAL: diagnostic evaluation of oropharyngeal swallow function    EVALUATING THERAPIST: YEIMI Mckeon      RESULTS:      DYSPHAGIA DIAGNOSIS:  severe  oropharyngeal phase dysphagia     DIET RECOMMENDATIONS:  NPO including medications to be given via alternate method      . Per patient choice/nursing judgement    FEEDING RECOMMENDATIONS:    Assistance level:  Not applicable     Compensatory strategies recommended: No strategies are recommended at this time and Not applicable     Discussed recommendations with nursing and/or faxed report to referring provider: Nursing not available, diet change recommendation placed in Physician sticky note section     Laryngeal Penetration and Aspiration:  Penetration WITH aspiration was observed in today's study with  thin liquid, mildly thick liquid (nectar)    SPEECH THERAPY  PLAN OF CARE   The dysphagia POC is established based on physician order and dysphagia diagnosis    Skilled SLP intervention for dysphagia management on acute care 3-5 x per week until goals met, pt plateaus in function and/or discharged from hospital      Conditions Requiring Skilled Therapeutic Intervention for dysphagia:    Patient is performing below functional baseline d/t  current acute condition, Multiple diagnoses, multiple medications, and increased dependency upon caregivers.     SPECIFIC DYSPHAGIA INTERVENTIONS TO INCLUDE:     Therapeutic exercises  ongoing skilled PO analysis to determine if PO diet can be initiated     Specific instructions for next treatment:  initiate instruction of therapeutic exercises  and ongoing skilled PO analysis to determine if PO diet can be initiated   Treatment Goals:    Short Term Goals:  Pt will participate in ongoing evaluation of swallow function to determine when PO diet can be safely initiated  Pt will complete laryngeal strength/ ROM therapeutic exercises to improve airway protection for the least restrictive PO diet moderate verbal prompts    Long Term Goals:   Pt will maintain adequate nutrition/hydration via PO intake of the least restrictive oral diet with implementation of safe swallow/ compensatory strategies and decrease signs/symptoms of aspiration to less than 1 x/day. Patient/family Goal:    To be able to [address] [place] [facility] [employer] discussed with Patient and Family   The Family understand(s) the diagnosis, prognosis and plan of care     Rehabilitation Potential/Prognosis: poor                      ADMITTING DIAGNOSIS: Closed fracture of right hip, initial encounter [facility]) [S72.001A]  Hip fracture requiring operative repair, left, open type I or II, initial encounter ([facility] [address] [S72.002B]  Fall on same level from slipping, tripping or stumbling, initial encounter [W01. 0XXA]  Traumatic rhabdomyolysis, initial encounter ([facility] [address] Unice Perking. 6XXA]     VISIT DIAGNOSIS:   Visit Diagnoses         Codes    Closed fracture of right hip, initial encounter [facility])    -  Primary S72.001A    Fall on same level from slipping, tripping or stumbling, initial encounter     W01. 0XXA    Traumatic rhabdomyolysis, initial encounter ([facility] [address]     T79. 6XXA    Closed fracture of left hip, initial encounter ([facility] [address]     S72.002A                PATIENT REPORT/COMPLAINT: patient currently NPO pending results of this evaluation    PRIOR LEVEL OF SWALLOW FUNCTION:    Past History of Dysphagia?:  none reported    Home diet: Regular consistency solids (IDDSI level 7) with  thin liquids (IDDSI level 0)  Current Diet Order:  Diet NPO    PROCEDURE:  Consistencies Administered During the Evaluation Liquids: thin liquid and nectar thick liquid   Solids:  pureed foods      Method of Intake:   spoon  Fed by clinician      Position:   Seated, upright, Lateral plane    INSTRUMENTAL ASSESSMENT:    ORAL PREP/ ORAL PHASE:    Lingual pumping present     PHARYNGEAL PHASE:     ONSET TIME       Delayed initiation of the pharyngeal swallow was noted with swallow reflex triggered at the level of the vallecula        PHARYNGEAL RESIDUALS        Vallecula/Pharyngeal Wall           Reduced tongue base retraction resulting in residuals in vallecula and/or posterior pharyngeal wall for pureed foods which minimally cleared with cued multiple swallow  and Residuals in the vallecula due to inadequate epiglottic inversion were noted for pureed foods  which minimally cleared with cued multiple swallow       Pyriform Sinuses      No significant residuals were noted in the pyriform sinuses     LARYNGEAL PENETRATION   Laryngeal penetration occurred prior to aspiration. Further details under aspiration section. ASPIRATION  Aspiration occurred DURING the swallow for thin liquid and nectar consistency liquid due to  delayed laryngeal closure and inadequate laryngeal closure . Aspiration was severe and occurred consistently . an inconsistent cough was noted    PENETRATION-ASPIRATION SCALE (PAS):  THIN 8 = Material enters the airway, passes below the vocal folds, and no effort is made to eject   MILDLY THICK 7 = Material enters the airway, passes below the vocal folds, and is not ejected from the trachea despite effort   MODERATELY THICK item not administered  PUREE 1 = Material does not enter the airway  HARD SOLID item not administered       COMPENSATORY STRATEGIES    Compensatory strategies that were not beneficial included Multiple swallow, Effortful swallow, and Chin tuck      STRUCTURAL/FUNCTIONAL ANOMALIES   No structural/functional anomalies were noted    CERVICAL ESOPHAGEAL STAGE :     The cervical esophagus appeared adequate          ___________    Cognition:   Confusion noted and Insight to current deficits impaired,    Oral Peripheral Examination   Generalized oral weakness    Current Respiratory Status   3 liters nasal cannula     Parameters of Speech Production  Respiration:  Adequate for speech production  Quality:   Within functional limits  Intensity: Quiet    Pain: No pain reported. EDUCATION:   The Speech Language Pathologist (SLP) completed education regarding results of evaluation and that intervention is warranted at this time. Learner: Patient and family  Education: Reviewed results and recommendations of this evaluation, Reviewed diet and strategies, and Reviewed signs, symptoms and risks of aspiration  Evaluation of Education:  Needs further instruction    This plan may be re-evaluated and revised as warranted. Evaluation Time includes thorough review of current medical information, gathering information on past medical history/social history and prior level of function, completion of standardized testing/informal observation of tasks, assessment of data and education on plan of care and goals. [x]The admitting diagnosis and active problem list, have been reviewed prior to initiation of this evaluation.     CPT Code: 89274  dysphagia study    ACTIVE PROBLEM LIST:   Patient Active Problem List   Diagnosis    Degenerative arthropathy of spinal facet joint, Multilevel of the cervical spine    DDD (degenerative disc disease), cervicalC6-C7    Spondylosis, lumbosacral    Lumbar facet arthropathy    Lumbar spinal stenosis    Osteoarthrosis, hip    Knee pain    Facet syndrome right C2-C6     Degenerative osteoarthrosis right C2-C6    Protruded cervical disc    Cervical radiculopathy    Neural foraminal stenosis of lumbar spine    Neural foraminal stenosis of cervical spine    Osteoarthritis of lumbar spine, degenerative with facet syndrome    Hip fracture requiring operative repair, left, open type I or II, initial encounter [facility])       Salima Narayan, [address] [address]

## 2022-07-16 NOTE — PROGRESS NOTES
Department of Orthopedic Surgery  Resident Progress Note    Patient seen and examined.   Pain is well controlled, patient states she is hungry because dietary has her NPO due to some choking episodes, otherwise has no complaints    VITALS:  /64   Pulse 73   Temp 98 °F (36.7 °C) (Axillary)   Resp 18   Ht 5' 2\" (1.575 m)   Wt 91 lb 3.2 oz (41.4 kg)   SpO2 97%   BMI 16.68 kg/m²     General: alert and oriented to person, place and time, well-developed and well-nourished, in no acute distress    MUSCULOSKELETAL:   right lower extremity:  Dressing C/D/I  Compartments soft and compressible  +PF/DF/EHL  +2/4 DP & PT pulses, Brisk Cap refill, Toes warm and perfused  Distal sensation grossly intact to Peroneals, Sural, Saphenous, and tibial nrs    CBC:   Lab Results   Component Value Date/Time    WBC 8.1 07/16/2022 07:11 AM    HGB 8.3 07/16/2022 07:11 AM    HCT 26.8 07/16/2022 07:11 AM     07/16/2022 07:11 AM     PT/INR:    Lab Results   Component Value Date/Time    PROTIME 12.3 07/14/2022 01:05 AM    INR 1.1 07/14/2022 01:05 AM         ASSESSMENT  S/P right hip ORIF with cephalomedullary nail on 7/15    PLAN      Continue physical therapy and protocol: WBAT -R LE  24 hour abx coverage  Deep venous thrombosis prophylaxis -ok to resume today per primary, early mobilization  PT/OT  Pain Control: IV and PO  Monitor H&H  D/C Plan: Likely SNF at [place] pending PT/OT evaluation

## 2022-07-17 NOTE — PROGRESS NOTES
Department of Internal Medicine  General Internal Medicine  Attending Progress Note      SUBJECTIVE:    Patient seen and examined this morning  Awake , somewhat confused no distress  No complaints. Failed swallowing study and NPO for now. Medications    Current Facility-Administered Medications: metoprolol (LOPRESSOR) injection 5 mg, 5 mg, IntraVENous, Q12H  sodium chloride flush 0.9 % injection 5-40 mL, 5-40 mL, IntraVENous, 2 times per day  sodium chloride flush 0.9 % injection 5-40 mL, 5-40 mL, IntraVENous, PRN  0.9 % sodium chloride infusion, 25 mL, IntraVENous, PRN  pantoprazole (PROTONIX) injection 40 mg, 40 mg, IntraVENous, Daily  dextrose 5 % solution, , IntraVENous, Continuous  cefTRIAXone (ROCEPHIN) 1,000 mg in sterile water 10 mL IV syringe, 1,000 mg, IntraVENous, Q24H  sodium chloride flush 0.9 % injection 5-40 mL, 5-40 mL, IntraVENous, 2 times per day  sodium chloride flush 0.9 % injection 5-40 mL, 5-40 mL, IntraVENous, PRN  0.9 % sodium chloride infusion, , IntraVENous, PRN  fentaNYL (SUBLIMAZE) injection 25 mcg, 25 mcg, IntraVENous, Q5 Min PRN  fentaNYL (SUBLIMAZE) injection 50 mcg, 50 mcg, IntraVENous, Q5 Min PRN  meperidine (DEMEROL) injection 12.5 mg, 12.5 mg, IntraVENous, Q5 Min PRN  aspirin chewable tablet 81 mg, 81 mg, Oral, BID WC  morphine (PF) injection 2 mg, 2 mg, IntraVENous, Q2H PRN **OR** morphine injection 4 mg, 4 mg, IntraVENous, Q2H PRN  oxyCODONE (ROXICODONE) immediate release tablet 5 mg, 5 mg, Oral, Q4H PRN **OR** oxyCODONE (ROXICODONE) immediate release tablet 10 mg, 10 mg, Oral, Q4H PRN    Physical    VITALS:  /62   Pulse (!) 104   Temp 98.2 °F (36.8 °C) (Axillary)   Resp 18   Ht 5' 2\" (1.575 m)   Wt 91 lb 3.2 oz (41.4 kg)   SpO2 95%   BMI 16.68 kg/m²   TEMPERATURE:  Current - Temp: 98.2 °F (36.8 °C);  Max - Temp  Av.3 °F (36.8 °C)  Min: 98 °F (36.7 °C)  Max: 98.6 °F (37 °C)  RESPIRATIONS RANGE: Resp  Av.7  Min: 18  Max: 24  PULSE RANGE: Pulse  Av.5 Min: 100  Max: 120  BLOOD PRESSURE RANGE:  Systolic (67TGU), LWF:981 , Min:122 , BAE:242   ; Diastolic (99RNE), AAB:02, Min:62, Max:93    PULSE OXIMETRY RANGE: SpO2  Av.7 %  Min: 95 %  Max: 97 %  24HR INTAKE/OUTPUT:  No intake or output data in the 24 hours ending 22 1410      CONSTITUTIONAL: Awake, no distress, appears as stated age. Frail. HEENT: Normocephalic atraumatic. No icterus, mild pallor  NECK: Supple, no JVD , no lymphadenopathy, no bruits, no thyromegaly  LUNGS: Clear to auscultation bilaterally. Diminished over bases  CARDIOVASCULAR: Regularly irregular, no murmur ,rub or gallop. ABDOMEN: Soft, nontender, bowel sounds are positive, no organomegaly appreciated. NEUROLOGIC: Awake, alert, oriented to place and partially in time, no focal deficits noted. EXT: No edema, clubbing, or cyanosis.     CBC with Differential:    Lab Results   Component Value Date/Time    WBC 10.1 2022 09:13 AM    RBC 2.71 2022 09:13 AM    HGB 8.1 2022 09:13 AM    HCT 26.8 2022 09:13 AM     2022 09:13 AM    MCV 98.9 2022 09:13 AM    MCH 29.9 2022 09:13 AM    MCHC 30.2 2022 09:13 AM    RDW 15.5 2022 09:13 AM    SEGSPCT 73 2013 02:45 PM    LYMPHOPCT 7.0 2022 07:11 AM    MONOPCT 0.9 2022 07:11 AM    BASOPCT 0.0 2022 07:11 AM    MONOSABS 0.08 2022 07:11 AM    LYMPHSABS 0.57 2022 07:11 AM    EOSABS 0.00 2022 07:11 AM    BASOSABS 0.00 2022 07:11 AM     CMP:    Lab Results   Component Value Date/Time     2022 09:13 AM    K 4.1 2022 09:13 AM     2022 09:13 AM    CO2 28 2022 09:13 AM    BUN 54 2022 09:13 AM    CREATININE 0.7 2022 09:13 AM    GFRAA >60 2022 09:13 AM    LABGLOM >60 2022 09:13 AM    GLUCOSE 132 2022 09:13 AM    PROT 5.4 2022 09:13 AM    LABALBU 2.9 2022 09:13 AM    CALCIUM 9.1 2022 09:13 AM    BILITOT 0.6 2022 09:13 AM ALKPHOS 59 07/17/2022 09:13 AM    AST 28 07/17/2022 09:13 AM    ALT 13 07/17/2022 09:13 AM       Assessment and plan    1. S/P Fall. 2.Rt intertrochanteric fracture of the neck of femur, Orthopedics following , S/P ORIF with intramedullary nail 7/15th  Pain controlled. PT/OT, awaiting rehab placement. 3.Sinus tachycardia with PAC's   Lopressor 5 mg iv q 12 hrs. 4.Dysphagia and possible aspiration, NPO for now, iv fluids, speech therapy following and NPO recommended after failing swallowing study. Discussed with Daughter Tyson Gomes in detail over the phone 7/16th. GI consult for possible PEG tube placement. 5.Paget's disease of bone. 6.Generalized osteoarthritis. 7.Leukocytosis, urine with pyuria and possible aspiration pneumonia, White cell count down to normal.  ID consulted and started on Rocephin iv. Blood and urine cultures negative so far. 8.Hypernatremia, change iv fluids to D5W at 50 cc/hr.     Keren Cameron MD, MD.  2:10 PM  7/17/2022

## 2022-07-17 NOTE — PROGRESS NOTES
Physical Therapy    Physical Therapy Initial Evaluation/Plan of Care    Room #:  3362/3676-10  Patient Name: Casey Smallwood  YOB: 1928  MRN: [id number]    Date of Service: 7/17/2022     Tentative placement recommendation: subacute rehab   Equipment recommendation: To be determined      Evaluating Physical Therapist: Jenifer Bills  #[id number]     Specific Provider Orders/Date/Referring Provider :  07/16/22 0900    PT eval and treat  Start:  07/16/22 0900,   End:  07/16/22 0900,   ONE TIME,   Standing Count:  1 Occurrences,   Nick Diane MD     Admitting Diagnosis:   Closed fracture of right hip, initial encounter [facility]) [S72.001A]  Hip fracture requiring operative repair, left, open type I or II, initial encounter ([facility] [address] [S72.002B]  Fall on same level from slipping, tripping or stumbling, initial encounter [W01. 0XXA]  Traumatic rhabdomyolysis, initial encounter ([facility] [address] Onofre Ulloaon. 6XXA]   Visit diagnosis:   Visit Diagnoses         Codes    Closed fracture of right hip, initial encounter [facility])    -  Primary S72.001A    Fall on same level from slipping, tripping or stumbling, initial encounter     W01. 0XXA    Traumatic rhabdomyolysis, initial encounter ([facility] [address]     T79. 6XXA    Closed fracture of left hip, initial encounter [facility])     S72.002A        Admitted with  fall, unknown time on floor 7/13    Surgery:     right   INTROCHANTERIC CEPHALOMEDULLARY NAIL  INSERTION    Patient Active Problem List   Diagnosis    Degenerative arthropathy of spinal facet joint, Multilevel of the cervical spine    DDD (degenerative disc disease), cervicalC6-C7    Spondylosis, lumbosacral    Lumbar facet arthropathy    Lumbar spinal stenosis    Osteoarthrosis, hip    Knee pain    Facet syndrome right C2-C6     Degenerative osteoarthrosis right C2-C6    Protruded cervical disc    Cervical radiculopathy    Neural foraminal stenosis of lumbar spine    Neural foraminal stenosis of cervical spine    Osteoarthritis of lumbar spine, degenerative with facet syndrome    Hip fracture requiring operative repair, left, open type I or II, initial encounter ([facility] [address]       ASSESSMENT of current deficits: Patient exhibits decreased strength, balance, endurance, range of motion, coordination, and pain    impairing functional mobility, transfers, gait , gait distance, and tolerance to activity are barriers to d/c and require skilled intervention during hospital stay to attain pre hospital level of function. Decreased strength, balance and endurance  increases patient's risk for fall. PHYSICAL THERAPY  PLAN OF CARE       Physical therapy plan of care is established based on physician order,  patient diagnosis and clinical assessment    Current Treatment Recommendations:    -Bed Mobility: Lower extremity exercises , Upper extremity exercises , and Trunk control activities   -Sitting Balance: Incorporate reaching activities to activate trunk muscles , Hands on support to maintain midline , and Facilitate postural control in all planes   -Standing Balance: Perform strengthening exercises in standing to promote motor control with or without upper extremity support  and Instruct patient on adequate base of support to maintain balance  -Transfers: Provide instruction on proper hand and foot position for adequate transfer of weight onto lower extremities and use of gait device if needed, Cues for hand placement, technique and safety.  Provide stabilization to prevent fall , and Facilitate weight shift forward on to lower extremities and provide necessary stabilization of bilateral lower extremities   -Gait: Gait training, Standing activities to improve: base of support, weight shift, weight bearing , and Exercises to improve hip and knee control   -Endurance: Utilize Supervised activities to increase level of endurance to allow for safe functional mobility including transfers and gait     PT long term treatment goals are located in below grid    Patient and or family understand(s) diagnosis, prognosis, and plan of care. Frequency of treatments: Patient will be seen  twice daily  for therapeutic exercise, functional retraining, endurance activities, balance exercises, family and patient education.        Prior Level of Function: Patient ambulated independently and climbed steps  Rehab Potential: good   for baseline    Past medical history:   Past Medical History:   Diagnosis Date    Arthritis     Osteoporosis     Paget's disease      Past Surgical History:   Procedure Laterality Date    COLON SURGERY      Diverticuli    JOINT REPLACEMENT      left 2010    KNEE SURGERY      Left x 2    LUNG BIOPSY      NERVE BLOCK  11-    NERVE BLOCK  11 16 2011    right cervical paravertebral facet #2    NERVE BLOCK  11 30 2011    NERVE BLOCK  12 28 2012    cervical epidural #1    NERVE BLOCK      1-9-2013    NERVE BLOCK  1 21 13    cerv ep #3    NERVE BLOCK Right 06 12 2013    cervical paravertebral facet #1    NERVE BLOCK Right 6 24 13    cerv facet #2    NERVE BLOCK Right 07 24 2013    cervical paravertebral facet #3    NERVE BLOCK Right 1 15 14    cerv transforam #1    NERVE BLOCK Right 2/19/2014    right cervical transforaminal nerve block  #2    NERVE BLOCK  03/05/14    transforaminal nerve block right cervical #3    NERVE BLOCK Bilateral 10/29/2014    himanshu lumbar paravertebral facet  #1    NERVE BLOCK Bilateral 11/5/2014    bilateral paravertebral facet  lumbar  #2    NERVE BLOCK Bilateral 11/12/14    paravertebral facet lumbar #3    NERVE BLOCK Right 05/22/2017    transforaminal cervical #1    NERVE BLOCK Right 06/05/2017    transforaminal #2    NERVE BLOCK Bilateral 06/12/2017    lumbar paravertebral facet #1    NERVE BLOCK Bilateral 06/21/2017    Bilatera lumbar paravertebral facet block 32 l3-4 l4-5 l5-s1    NERVE BLOCK Bilateral 09/09/2020    lumbar facet block    NERVE BLOCK Bilateral 9/9/2020    BILATERAL LUMBAR FACET BLOCKS AT L3-4,L4-5,L5-S1 x2 UNDER X-RAY #1 performed by Jeanie Lenz DO at [facility] Bilateral 09/16/2020    lumbar facet paravertebral    NERVE BLOCK Bilateral 9/16/2020    BILATERAL LUMBAR FACET BLOCKS AT L3-4,L4-5,L5-S1 x2 UNDER X-RAY #2 performed by Jeanie Lenz DO at [facility] 8 Right 09 20 2013    radiofrequency neurolysis medial branch nerve cervical    TOTAL HIP ARTHROPLASTY      Left         SUBJECTIVE:    Precautions:  Continuous Pulse Oximetry , falls, alarm, and O2 ,right lower extremity FWB (full weight bearing)     Social history: Patient lives with daughter in a  3 story home in [place] [place] on 3rd level   with  multiple steps   to enter with [address]: Cee Maylin,       [address] [facility]   How much difficulty turning over in bed?: A Lot  How much difficulty sitting down on / standing up from a chair with arms?: A Lot  How much difficulty moving from lying on back to sitting on side of bed?: A Lot  How much help from another person moving to and from a bed to a chair?: A Lot  How much help from another person needed to walk in hospital room?: Total  How much help from another person for climbing 3-5 steps with a railing?: Total  AM-PAC Inpatient Mobility Raw Score : 10  AM-PAC Inpatient T-Scale Score : 32.29  Mobility Inpatient CMS 0-100% Score: 76.75  Mobility Inpatient CMS G-Code Modifier : CL    Nursing cleared patient for PT evaluation. The admitting diagnosis and active problem list as listed above have been reviewed prior to the initiation of this evaluation. OBJECTIVE:   Initial Evaluation  Date: 7/17/2022 Treatment Date: Short Term/ Long Term   Goals   Was pt agreeable to Eval/treatment?  Yes     Pain Level  Unrated however with movement of  Right hip grimace and vocalized pain      Bed Mobility  Rolling: Maximal assist of 1    Supine to sit: Maximal assist of 1    Sit to supine: Maximal assist of 1 Scooting: Maximal assist of 1    Rolling: Independent    Supine to sit: Independent    Sit to supine: Independent    Scooting: Independent     Transfers Sit to stand: Maximal assist of 1 anterior approach x 2 reps; attempted with walker however patient fearful; patient able to bear wt bilateral however 2 person assist for wheeled walker trf will be necessary next visit  Sit to stand: Modified Independent     Ambulation    not assessed   100 feet using  wheeled walker with Modified Independent    ROM Within functional limits except Right hip 50%, pt has significant internal rotation with inability to tolerate > neutral    Increase range of motion 10% of affected joints    Strength Within functional limits  except  Right lower extremity 2-/5    Within functional limits   Balance Sitting EOB:  poor   Dynamic Standing:  poor     Sitting EOB:  good    Dynamic Standing:  good wheeled walker      Patient is Alert & Oriented x person, place, time, and situation and follows one step directions  hard of hearing   Sensation:  Patient denies numbness/tingling  Edema:  yes right lower extremity   Vitals: 3 Liters of o2 via nasal cannula   Blood Pressure at rest   Blood Pressure during session     Heart Rate at rest   Heart Rate during session     SPO2 at rest 95%  SPO2 during session 94%     Patient education   Patient educated on role of Physical Therapy, risks of immobility, safety and plan of care, importance of positional changes for oxygen exchange,  importance of mobility while in hospital , ankle pumps, quad set and glut set for edema control, blood clot prevention, importance and purpose of adaptive device and adjusted to proper height for the patient. , safety , weight bearing status , and need for neutral alignment in supine for proper healing       Patient response to education:   Pt verbalized understanding Pt demonstrated skill Pt requires further education in this area   Yes Partial Yes       Treatment:  Patient practiced and was instructed in the following treatment:     Therapist educated and facilitated patient on techniques to increase safety and independence with bed mobility, balance, functional transfers, and functional mobility. Patient performed ankle pumps, quad sets, glut sets x 15-20 each. Active assist heelslide, hip abduction/adduction, straight leg raise x 10 each a/a    Sat edge of bed 20 minutes with Minimal assist of 1 to increase dynamic sitting balance and activity tolerance. seated and standing challenges   At end of session, patient in bed with alarm call light and phone within reach,   all lines and tubes intact, nursing notified. Patient would benefit from continued skilled Physical Therapy to improve functional independence and quality of life. Patient's/ family goals   rehab      Patient and or family understand(s) diagnosis, prognosis, and plan of care. Frequency of treatments: Patient will be seen  twice daily  for therapeutic exercise, functional retraining, endurance activities, balance exercises, family and patient education. Time in  1040  Time out  1123    Total Treatment Time  23 minutes    Evaluation time includes thorough review of current medical information, gathering information on past medical history/social history and prior level of function, completion of standardized testing/informal observation of tasks, assessment of data, and development of Plan of care and goals.      CPT codes:  Low Complexity PT evaluation (67577)  Therapeutic activities (65112)   10 minutes  1 unit(s)  Therapeutic exercises (26596)   13 minutes  1 unit(s)    Paige Daly, PT

## 2022-07-17 NOTE — PROGRESS NOTES
Cardiology  Progress Note      SUBJECTIVE:  No chest pain. No dyspnea. No acute distress. Granddaughter was at bedside. Current Inpatient Medications  Current Facility-Administered Medications: sodium chloride flush 0.9 % injection 5-40 mL, 5-40 mL, IntraVENous, 2 times per day  sodium chloride flush 0.9 % injection 5-40 mL, 5-40 mL, IntraVENous, PRN  0.9 % sodium chloride infusion, 25 mL, IntraVENous, PRN  pantoprazole (PROTONIX) injection 40 mg, 40 mg, IntraVENous, Daily  dextrose 5 % solution, , IntraVENous, Continuous  metoprolol (LOPRESSOR) injection 5 mg, 5 mg, IntraVENous, Q6H  cefTRIAXone (ROCEPHIN) 1,000 mg in sterile water 10 mL IV syringe, 1,000 mg, IntraVENous, Q24H  sodium chloride flush 0.9 % injection 5-40 mL, 5-40 mL, IntraVENous, 2 times per day  sodium chloride flush 0.9 % injection 5-40 mL, 5-40 mL, IntraVENous, PRN  0.9 % sodium chloride infusion, , IntraVENous, PRN  fentaNYL (SUBLIMAZE) injection 25 mcg, 25 mcg, IntraVENous, Q5 Min PRN  fentaNYL (SUBLIMAZE) injection 50 mcg, 50 mcg, IntraVENous, Q5 Min PRN  meperidine (DEMEROL) injection 12.5 mg, 12.5 mg, IntraVENous, Q5 Min PRN  aspirin chewable tablet 81 mg, 81 mg, Oral, BID WC  morphine (PF) injection 2 mg, 2 mg, IntraVENous, Q2H PRN **OR** morphine injection 4 mg, 4 mg, IntraVENous, Q2H PRN  oxyCODONE (ROXICODONE) immediate release tablet 5 mg, 5 mg, Oral, Q4H PRN **OR** oxyCODONE (ROXICODONE) immediate release tablet 10 mg, 10 mg, Oral, Q4H PRN      Physical  VITALS:  /62   Pulse (!) 104   Temp 98.2 °F (36.8 °C) (Axillary)   Resp 18   Ht 5' 2\" (1.575 m)   Wt 91 lb 3.2 oz (41.4 kg)   SpO2 95%   BMI 16.68 kg/m²   CURRENT TEMPERATURE:  Temp: 98.2 °F (36.8 °C)  CONSTITUTIONAL: No acute distress. EYES: Vision is intact. ENT: No sore throat. No ear drainage. NECK: No JVD. BACK: Symmetric.   LUNGS:  egophony right base and left base  CARDIOVASCULAR:  normal S1 and S2 and murmurs include systolic murmur I/VI located at right upper sternal border without radiation  ABDOMEN:  non-tender  NEUROLOGIC: No focal deficits. EXTREMITIES: No edema cyanosis or clubbing. DATA:        Cardiology Labs:  BMP:    Lab Results   Component Value Date/Time     07/17/2022 03:34 PM    K 3.9 07/17/2022 03:34 PM     07/17/2022 03:34 PM    CO2 27 07/17/2022 03:34 PM    BUN 53 07/17/2022 03:34 PM     CBC:    Lab Results   Component Value Date/Time    WBC 10.1 07/17/2022 09:13 AM    RBC 2.71 07/17/2022 09:13 AM    HGB 8.1 07/17/2022 09:13 AM    HCT 26.8 07/17/2022 09:13 AM    MCV 98.9 07/17/2022 09:13 AM    RDW 15.5 07/17/2022 09:13 AM     07/17/2022 09:13 AM     PT/INR:  No results found for: PTINR  TROPONIN:  No components found for: TROP    ASSESSMENT    1.right hip fracture. Patient underwent surgery and she tolerated that fairly well from cardiac standpoint. 2.sinus tachycardia with PACs. Metoprolol p.o. was switched to IV as patient failed her swallow study. Heart rate is not well controlled. Dose of metoprolol IV was increased for better control of heart rate and in view of her atrial arrhythmias. 3.aspiration problem. Family decided to proceed with PEG tube. Long discussion with the family today about all above. All their questions were answered.       P

## 2022-07-17 NOTE — PROGRESS NOTES
Pt removed the following at approximately 0650: monitor leads, gown, purewick, and IV. Appears confused and aggitaged. Checked regularly through out night, q2hr turns and oral care performed. Inserted 20g in RAC, good return, well tolerated, no signs of infiltration. Wrapped in gauze to prevent pt from pulling it out.

## 2022-07-17 NOTE — PROGRESS NOTES
Department of Orthopedic Surgery  Resident Progress Note    Patient seen and examined. Pain controlled, no new complaints. VITALS:  /71   Pulse (!) 120 Comment: IRREG.   BOUNCES  Temp 98 °F (36.7 °C) (Axillary)   Resp 20   Ht 5' 2\" (1.575 m)   Wt 91 lb 3.2 oz (41.4 kg)   SpO2 97%   BMI 16.68 kg/m²     General: Awake and alert, very hard of hearing    MUSCULOSKELETAL:   right lower extremity:  Dressing C/D/I  Compartments soft and compressible  +PF/DF/EHL  +2/4 DP & PT pulses, Brisk Cap refill, Toes warm and perfused  Distal sensation grossly intact to Peroneals, Sural, Saphenous, and tibial nrs    CBC:   Lab Results   Component Value Date/Time    WBC 8.1 07/16/2022 07:11 AM    HGB 8.3 07/16/2022 07:11 AM    HCT 26.8 07/16/2022 07:11 AM     07/16/2022 07:11 AM     PT/INR:    Lab Results   Component Value Date/Time    PROTIME 12.3 07/14/2022 01:05 AM    INR 1.1 07/14/2022 01:05 AM         ASSESSMENT  S/P right hip ORIF with cephalomedullary nail on 7/15    PLAN      Continue physical therapy and protocol: WBAT -R LE  24 hour abx coverage-completed  Deep venous thrombosis prophylaxis -ok to resume today per primary, early mobilization  PT/OT  Pain Control: IV and PO  Monitor H&H  D/C Plan: Likely SNF at DC pending PT/OT evaluation

## 2022-07-17 NOTE — CONSULTS
Shazia Sims M.D., Dr. Paige Brock M.D., Carter Bates D.O.,   Sully Anthony M.D., Deedee Melendez D.O., and Dr. Jasen Fofana M.D. Re:PEG Placement, Dysphagia, Malnutrition  Requesting physician:Dr. Julio Menezes  Date:11:01 AM 7/17/2022    Jeanna Ort  80 y.o.  female    HPI: PT 79 yo F admitted 7/14/22 with femur fx and had surgery for this. CXR suggested aspiration PNA. Pt had MBS with aspiration to thin and nectar thick and NPO recommendation per speech therapy. PT states did not have issues like this prior. Pt has followed with Dr. Rashad Bradley. PAST MEDICAL HISTORY:  1. History of Paget's disease of bone. 2.  Generalized osteoarthritis. 3.  History of CVA in the past.  4.  History of emphysema and early idiopathic pulmonary fibrosis,  diagnosed at [facility] in 2016.  5.  Hypertension. 6.  Urge incontinence. 7.  Thyroid goiter. 8.  Unilateral vestibular weakness. PAST SURGICAL HISTORY:  1. Left hip replacement 11 years ago. 2.  History of appendectomy. 3.  History of colon resection for diverticulitis at the age of 72.  4.  Recent femur surgery     MEDICATIONS:  Metoprolol Succinate ER 25 mg daily, multivitamin daily,  vitamin D3 2000 units daily. ALLERGIES:  The patient is allergic to CODEINE, [address], BIAXIN, and  most PAIN MEDICATIONS. SOCIAL HISTORY:  The patient is a nonsmoker. No history of alcohol or  drug abuse. FAMILY HISTORY:  No hx GI malignancy can think of at this time. REVIEW OF SYSTEMS:  Unremarkable other than what is stated in the  history of present illness and past medical history. PE:  /62   Pulse (!) 104   Temp 98.2 °F (36.8 °C) (Axillary)   Resp 18   Ht 5' 2\" (1.575 m)   Wt 91 lb 3.2 oz (41.4 kg)   SpO2 95%   BMI 16.68 kg/m²     GENERAL APPEARANCE:  No acute distress, somewhat confused. HEENT:  Normocephalic, atraumatic. Pupils are equal and reactive.  NO icterus  Lungs:  Coarse B/l with no active wheeze  HEART:  Irregularly irregular. ABDOMEN:  Scaphoid, soft, nontender. Bowel sounds are present. No  organomegaly appreciated. EXTREMITIES:  There is no clubbing, no cyanosis. ASSESSMENT/PLAN:  1. Dysphagia/Aspiration -- Pt failed MBS and suggestion of aspiration PNA on CXR. No issues prior to this per pt and family. Pt with recent neck of femur fracture and surgery. Did d/w pt and 2 grand-daughters at bedside risks and benefits of PEG and need for ongoing speech therapy. Pt consents to proceed and all questions asked. 2.  Anemia -- possibly more post op from surgery above. Will put 2 units of PRBC on hold, serial Hg, add protonix for ulcer prophylaxis. 3.  High Sodium -- per admitting, will recheck labs in am to see if able to proceed with PEG Monday. Our service will follow.         Marisol Lyn DO  7/17/2022  11:01 AM

## 2022-07-17 NOTE — PROGRESS NOTES
[facility] [facility]  [facility]  Progress Note    NAME: Dawn Ramos  MR:  [id number]  :   10/17/1928  DATE OF SERVICE:22    This is a face to face encounter with Dawn Ramos 80 y.o. female on 22  Elements of this note, including Diagnosis,  Interval History, Past Medical/Surgical/Family/Social Histories, ROS, physical exam, and Assessment and Plan were copied and pasted from Previous. Updates have been made where noted and reflect current exam and medical decision making from the DOS of this encounter. CHIEF COMPLAINT     ID following for   Chief Complaint   Patient presents with    Fall     HISTORY OF PRESENT ILLNESS     Pt seen and examined  22  Family present updated  Working with pt on edge of bed    Patient is tolerating medications. No reported adverse drug reactions. REVIEW OF SYSTEMS     As stated above in the chief complaint, otherwise negative. CURRENT MEDICATIONS      metoprolol  5 mg IntraVENous Q12H    cefTRIAXone (ROCEPHIN) IV  1,000 mg IntraVENous Q24H    sodium chloride flush  5-40 mL IntraVENous 2 times per day    aspirin  81 mg Oral BID WC     Continuous Infusions:   sodium chloride      dextrose 5 % and 0.9 % NaCl 50 mL/hr at 22 0553     PRN Meds:sodium chloride flush, sodium chloride, fentanNYL, fentanNYL, meperidine, morphine **OR** morphine, oxyCODONE **OR** oxyCODONE    PHYSICAL EXAM     /62   Pulse (!) 104   Temp 98.2 °F (36.8 °C) (Axillary)   Resp 18   Ht 5' 2\" (1.575 m)   Wt 91 lb 3.2 oz (41.4 kg)   SpO2 95%   BMI 16.68 kg/m²   Temp  Av.3 °F (36.8 °C)  Min: 98 °F (36.7 °C)  Max: 98.6 °F (37 °C)  Constitutional:  The patient is awake, alert, and oriented. thin  Skin:    Warm and dry. No rashes were noted. HEENT:    AT/NC  Neck:    Supple to movements. Chest:   No use of accessory muscles to breathe. Symmetrical expansion. Dec bs bases  Cardiovascular:  S1 and S2 are rhythmic and regular.  No murmurs appreciated. Abdomen:   Positive bowel sounds to auscultation. Benign to palpation. Extremities:   No clubbing, no cyanosis,  edema. CNS    AAxO   Lines: piv      DIAGNOSTIC RESULTS   Radiology:    Recent Labs     07/16/22  0711 07/17/22  0913   WBC 8.1 10.1   RBC 2.72* 2.71*   HGB 8.3* 8.1*   HCT 26.8* 26.8*   MCV 98.5 98.9   MCH 30.5 29.9   MCHC 31.0* 30.2*   RDW 15.5* 15.5*    193   MPV 10.6 10.6     Recent Labs     07/16/22  0711 07/17/22  0913    150*   K 4.6 4.1   * 116*   CO2 27 28   BUN 47* 54*   CREATININE 0.7 0.7   GLUCOSE 138* 132*   PROT 5.4* 5.4*   LABALBU 2.8* 2.9*   CALCIUM 8.8 9.1   BILITOT 0.8 0.6   ALKPHOS 57 59   AST 32* 28   ALT 18 13     No results found for: CRP  Lab Results   Component Value Date    SEDRATE 12 02/04/2013     Recent Labs     07/16/22  0711 07/17/22  0913   AST 32* 28   ALT 18 13          Microbiology:      FINAL IMPRESSION    Pt had   Chief Complaint   Patient presents with    Fall    Admitted for Closed fracture of right hip, initial encounter ([facility] [S72.001A]  Hip fracture requiring operative repair, left, open type I or II, initial encounter ([facility] [S72.002B]  Fall on same level from slipping, tripping or stumbling, initial encounter [W01. 0XXA]  Traumatic rhabdomyolysis, initial encounter ([facility] Farrukh Avila. 6XXA]  On treatment for   Leukocytosis  s/p left hip fracture  Procedure(s): 7/15  RIGHT INTROCHANTERIC CEPHALOMEDULLARY NAIL  INSERTION  UTI      -bladder scan 375 repet   -check cx    cefTRIAXone (ROCEPHIN) 1,000 mg in sterile water 10 mL IV syringe,  qday      No change in POC    Monitor labs    Imaging and labs were reviewed per medical records. The patient was educated about the diagnosis, prognosis, indications, risks and benefits of treatment. An opportunity to ask questions was given to the patient/FAMILY. Thank you for involving me in the care of Florian White I will continue to follow.  Please do not hesitate to call for any questions or concerns.     Electronically signed by Abel Cevallos MD on 7/17/2022 at 10:37 AM

## 2022-07-18 PROBLEM — E43 SEVERE PROTEIN-CALORIE MALNUTRITION (HCC): Chronic | Status: ACTIVE | Noted: 2022-01-01

## 2022-07-18 NOTE — PROGRESS NOTES
Physical Therapy    Physical Therapy Treatment Note/Plan of Care    Room #:  4783/7114-42  Patient Name: Chase Phillips  YOB: 1928  MRN: [id number]    Date of Service: 7/18/2022     Tentative placement recommendation: subacute rehab   Equipment recommendation: To be determined      Evaluating Physical Therapist: Lida Patel, [facility]  #[id number]     Specific Provider Orders/Date/Referring Provider :  07/16/22 0900    PT eval and treat  Start:  07/16/22 0900,   End:  07/16/22 0900,   ONE TIME,   Standing Count:  1 Occurrences,   Carson Madrid MD     Admitting Diagnosis:   Closed fracture of right hip, initial encounter [facility]) [S72.001A]  Hip fracture requiring operative repair, left, open type I or II, initial encounter ([facility] [address] [S72.002B]  Fall on same level from slipping, tripping or stumbling, initial encounter [W01. 0XXA]  Traumatic rhabdomyolysis, initial encounter ([facility] [address] Rogerio Filter. 6XXA]   Visit diagnosis:   Visit Diagnoses         Codes    Closed fracture of right hip, initial encounter [facility])    -  Primary S72.001A    Fall on same level from slipping, tripping or stumbling, initial encounter     W01. 0XXA    Traumatic rhabdomyolysis, initial encounter ([facility] [address]     T79. 6XXA    Closed fracture of left hip, initial encounter [facility])     S72.002A        Admitted with  fall, unknown time on floor 7/13    Surgery:     right   INTROCHANTERIC CEPHALOMEDULLARY NAIL  INSERTION    Patient Active Problem List   Diagnosis    Degenerative arthropathy of spinal facet joint, Multilevel of the cervical spine    DDD (degenerative disc disease), cervicalC6-C7    Spondylosis, lumbosacral    Lumbar facet arthropathy    Lumbar spinal stenosis    Osteoarthrosis, hip    Knee pain    Facet syndrome right C2-C6     Degenerative osteoarthrosis right C2-C6    Protruded cervical disc    Cervical radiculopathy    Neural foraminal stenosis of lumbar spine    Neural foraminal stenosis of cervical spine    Osteoarthritis of lumbar spine, degenerative with facet syndrome    Hip fracture requiring operative repair, left, open type I or II, initial encounter ([facility] [address]       ASSESSMENT of current deficits: Patient exhibits decreased strength, balance, endurance, range of motion, coordination, and pain    impairing functional mobility, transfers, gait , gait distance, and tolerance to activity are barriers to d/c and require skilled intervention during hospital stay to attain pre hospital level of function. Patient needing moderate assist for all functional mobility due to pain and getting her to follow my cueing. Pt had a posterior lean initially in standing but with tactile cueing able to attain hip extension and a more upright position. PHYSICAL THERAPY  PLAN OF CARE       Physical therapy plan of care is established based on physician order,  patient diagnosis and clinical assessment    Current Treatment Recommendations:    -Bed Mobility: Lower extremity exercises , Upper extremity exercises , and Trunk control activities   -Sitting Balance: Incorporate reaching activities to activate trunk muscles , Hands on support to maintain midline , and Facilitate postural control in all planes   -Standing Balance: Perform strengthening exercises in standing to promote motor control with or without upper extremity support  and Instruct patient on adequate base of support to maintain balance  -Transfers: Provide instruction on proper hand and foot position for adequate transfer of weight onto lower extremities and use of gait device if needed, Cues for hand placement, technique and safety.  Provide stabilization to prevent fall , and Facilitate weight shift forward on to lower extremities and provide necessary stabilization of bilateral lower extremities   -Gait: Gait training, Standing activities to improve: base of support, weight shift, weight bearing , and Exercises to improve hip and knee control   -Endurance: Utilize Supervised activities to increase level of endurance to allow for safe functional mobility including transfers and gait     PT long term treatment goals are located in below grid    Patient and or family understand(s) diagnosis, prognosis, and plan of care. Frequency of treatments: Patient will be seen  twice daily  for therapeutic exercise, functional retraining, endurance activities, balance exercises, family and patient education.        Prior Level of Function: Patient ambulated independently and climbed steps  Rehab Potential: good   for baseline    Past medical history:   Past Medical History:   Diagnosis Date    Arthritis     Osteoporosis     Paget's disease      Past Surgical History:   Procedure Laterality Date    COLON SURGERY      Diverticuli    JOINT REPLACEMENT      left 2010    KNEE SURGERY      Left x 2    LUNG BIOPSY      NERVE BLOCK  11-    NERVE BLOCK  11 16 2011    right cervical paravertebral facet #2    NERVE BLOCK  11 30 2011    NERVE BLOCK  12 28 2012    cervical epidural #1    NERVE BLOCK      1-9-2013    NERVE BLOCK  1 21 13    cerv ep #3    NERVE BLOCK Right 06 12 2013    cervical paravertebral facet #1    NERVE BLOCK Right 6 24 13    cerv facet #2    NERVE BLOCK Right 07 24 2013    cervical paravertebral facet #3    NERVE BLOCK Right 1 15 14    cerv transforam #1    NERVE BLOCK Right 2/19/2014    right cervical transforaminal nerve block  #2    NERVE BLOCK  03/05/14    transforaminal nerve block right cervical #3    NERVE BLOCK Bilateral 10/29/2014    himanshu lumbar paravertebral facet  #1    NERVE BLOCK Bilateral 11/5/2014    bilateral paravertebral facet  lumbar  #2    NERVE BLOCK Bilateral 11/12/14    paravertebral facet lumbar #3    NERVE BLOCK Right 05/22/2017    transforaminal cervical #1    NERVE BLOCK Right 06/05/2017    transforaminal #2    NERVE BLOCK Bilateral 06/12/2017    lumbar paravertebral facet #1    NERVE BLOCK Bilateral 06/21/2017    Bilatera lumbar paravertebral facet block 32 l3-4 l4-5 l5-s1 NERVE BLOCK Bilateral 09/09/2020    lumbar facet block    NERVE BLOCK Bilateral 9/9/2020    BILATERAL LUMBAR FACET BLOCKS AT L3-4,L4-5,L5-S1 x2 UNDER X-RAY #1 performed by Tanner Ellsworth DO at [facility] Bilateral 09/16/2020    lumbar facet paravertebral    NERVE BLOCK Bilateral 9/16/2020    BILATERAL LUMBAR FACET BLOCKS AT L3-4,L4-5,L5-S1 x2 UNDER X-RAY #2 performed by Tanner Ellsworth DO at [address] [place] [address] 24 Right 09 20 2013    radiofrequency neurolysis medial branch nerve cervical    TOTAL HIP ARTHROPLASTY      Left         SUBJECTIVE:    Precautions:  Continuous Pulse Oximetry , falls, alarm, and O2 ,right lower extremity FWB (full weight bearing)     Social history: Patient lives with daughter in a  3 story home in [facility] on 3rd level   with  multiple steps   to enter with [address]: [employer]shin Narayanan,       [address] [place] [address]   How much difficulty turning over in bed?: A Lot  How much difficulty sitting down on / standing up from a chair with arms?: A Lot  How much difficulty moving from lying on back to sitting on side of bed?: A Lot  How much help from another person moving to and from a bed to a chair?: A Lot  How much help from another person needed to walk in hospital room?: A Lot  How much help from another person for climbing 3-5 steps with a railing?: Total  AM-PAC Inpatient Mobility Raw Score : 11  AM-PAC Inpatient T-Scale Score : 33.86  Mobility Inpatient CMS 0-100% Score: 72.57  Mobility Inpatient CMS G-Code Modifier : CL    Nursing cleared patient for PT treatment. OBJECTIVE:   Initial Evaluation  Date: 7/17/2022 Treatment Date:  7/18/2022   Short Term/ Long Term   Goals   Was pt agreeable to Eval/treatment?  Yes yes    Pain Level  Unrated however with movement of  Right hip grimace and vocalized pain 10/10  Right hip     Bed Mobility  Rolling: Maximal assist of 1    Supine to sit: Maximal assist of 1    Sit to supine: Maximal assist of 1    Scooting: Maximal assist of 1   Rolling: Not assessed patient in chair   Supine to sit: Not assessed patient in chair   Sit to supine: Moderate assist of  2   Scooting: Not assessed patient in chair    Rolling: Independent    Supine to sit: Independent    Sit to supine: Independent    Scooting: Independent     Transfers Sit to stand: Maximal assist of 1 anterior approach x 2 reps; attempted with walker however patient fearful; patient able to bear wt bilateral however 2 person assist for wheeled walker trf will be necessary next visit Sit to stand:  Moderate assist of 1 Cues for hand placement and safety     Sit to stand: Modified Independent     Ambulation    not assessed  3 feet using  wheeled walker with Moderate assist of 1   cues for sequencing, FWB (full weight bearing) weight bearing right lower extremity, upright posture, walker approximation, increased base of support, increased step length, and safety   100 feet using  wheeled walker with Modified Independent    ROM Within functional limits except Right hip 50%, pt has significant internal rotation with inability to tolerate > neutral    Increase range of motion 10% of affected joints    Strength Within functional limits  except  Right lower extremity 2-/5    Within functional limits   Balance Sitting EOB:  poor   Dynamic Standing:  poor    Sitting EOB: fair   Dynamic Standing: fair  with wheeled walker   Sitting EOB:  good    Dynamic Standing:  good wheeled walker      Patient is Alert & Oriented x person, place, time, and situation and follows one step directions  hard of hearing   Sensation:  Patient denies numbness/tingling  Edema:  yes right lower extremity   Vitals: 3 Liters of o2 via nasal cannula   Blood Pressure at rest   Blood Pressure during session     Heart Rate at rest   Heart Rate during session     SPO2 at rest 97%  SPO2 during session %     Patient education   Patient educated on role of Physical Therapy, risks of immobility, safety and plan of care, importance of positional changes for oxygen exchange,  importance of mobility while in hospital , ankle pumps, quad set and glut set for edema control, blood clot prevention, importance and purpose of adaptive device and adjusted to proper height for the patient. , safety , weight bearing status , and need for neutral alignment in supine for proper healing       Patient response to education:   Pt verbalized understanding Pt demonstrated skill Pt requires further education in this area   Yes Partial Yes       Treatment:  Patient practiced and was instructed in the following treatment:     Therapist educated and facilitated patient on techniques to increase safety and independence with bed mobility, balance, functional transfers, and functional mobility. Patient sitting in a chair at start of tx session. Pt stood, posterior lean, tactile cueing for increased hip extension, took steps to the bed. Assisted nursing with pulling her up towards [facility] was maximal assist x 2 and repositioning the patient. Patient has both lower extremities in internal rotation in supine, I put a pillow under her right leg and a rolled blanket on her medial side of the right leg to attain a neutral position. At end of session, patient in bed with alarm call light and phone within reach,   all lines and tubes intact, nursing notified. Patient would benefit from continued skilled Physical Therapy to improve functional independence and quality of life. Patient's/ family goals   rehab      Patient and or family understand(s) diagnosis, prognosis, and plan of care. Frequency of treatments: Patient will be seen  twice daily  for therapeutic exercise, functional retraining, endurance activities, balance exercises, family and patient education.      Time in 10:42  Time out 10:50    Total Treatment Time  8 minutes        CPT codes:    Therapeutic activities (69613)   8 minutes  1 unit(s)    Kings Patricio.  [facility]  LIC # [id number]

## 2022-07-18 NOTE — ANESTHESIA PRE PROCEDURE
Department of Anesthesiology  Preprocedure Note       Name:  Casey Smallwood   Age:  80 y.o.  :  10/17/1928                                          MRN:  [id number]         Date:  2022      Surgeon: Vinnie Johnson):  Janet Chung MD    Procedure: Procedure(s):  EGD ESOPHAGOGASTRODUODENOSCOPY PEG TUBE INSERTION    Medications prior to admission:   Prior to Admission medications    Medication Sig Start Date End Date Taking? Authorizing Provider   aspirin EC 81 MG EC tablet Take 1 tablet by mouth in the morning and 1 tablet in the evening. Take with meals. 7/15/22 8/14/22 Yes Parveen Vidal DO   oxyCODONE-acetaminophen (PERCOCET) 5-325 MG per tablet Take 1 tablet by mouth every 6 hours as needed for Pain for up to 7 days. Intended supply: 7 days. Take lowest dose possible to manage pain 7/15/22 7/22/22 Yes Parveen Vidal DO   metoprolol succinate (TOPROL XL) 25 MG extended release tablet Take 25 mg by mouth daily    Historical Provider, MD   acetaminophen (TYLENOL) 500 MG tablet Take 500 mg by mouth daily as needed.     Patient not taking: Reported on 2022    Historical Provider, MD       Current medications:    Current Facility-Administered Medications   Medication Dose Route Frequency Provider Last Rate Last Admin    ceFAZolin (ANCEF) 2,000 mg in sterile water 20 mL IV syringe  2,000 mg IntraVENous On Call Betty Moran MD        sodium chloride flush 0.9 % injection 5-40 mL  5-40 mL IntraVENous 2 times per day Clari Abdi, DO   10 mL at 22 1030    sodium chloride flush 0.9 % injection 5-40 mL  5-40 mL IntraVENous PRN Clari Abdi, DO        0.9 % sodium chloride infusion  25 mL IntraVENous PRN Clari Abdi, DO        pantoprazole (PROTONIX) injection 40 mg  40 mg IntraVENous Daily Ignacio Bautista DO   40 mg at 22 1029    dextrose 5 % solution   IntraVENous Continuous Cali Landeros MD 50 mL/hr at 22 1247 New Bag at 22 1247    metoprolol (LOPRESSOR) injection 5 mg  5 mg IntraVENous Q6H Trini Crawford MD   5 mg at 07/18/22 1240    cefTRIAXone (ROCEPHIN) 1,000 mg in sterile water 10 mL IV syringe  1,000 mg IntraVENous Q24H Henderson Gilford, MD   1,000 mg at 07/18/22 1238    sodium chloride flush 0.9 % injection 5-40 mL  5-40 mL IntraVENous 2 times per day Nazanin Campa MD   10 mL at 07/16/22 1307    sodium chloride flush 0.9 % injection 5-40 mL  5-40 mL IntraVENous PRN Nazanin Campa MD        0.9 % sodium chloride infusion   IntraVENous PRN Nazanin Campa MD        fentaNYL (SUBLIMAZE) injection 25 mcg  25 mcg IntraVENous Q5 Min PRN Nazanin Campa MD        fentaNYL (SUBLIMAZE) injection 50 mcg  50 mcg IntraVENous Q5 Min PRN Nazanin Campa MD        meperidine (DEMEROL) injection 12.5 mg  12.5 mg IntraVENous Q5 Min PRN Nazanin Campa MD   12.5 mg at 07/15/22 1329    aspirin chewable tablet 81 mg  81 mg Oral BID WC Margdulce France, DO        morphine (PF) injection 2 mg  2 mg IntraVENous Q2H PRN Cara France, DO   2 mg at 07/16/22 1843    Or    morphine injection 4 mg  4 mg IntraVENous Q2H PRN Marge France, DO        oxyCODONE (ROXICODONE) immediate release tablet 5 mg  5 mg Oral Q4H PRN Marge France, DO        Or    oxyCODONE (ROXICODONE) immediate release tablet 10 mg  10 mg Oral Q4H PRN Marge France, DO           Allergies:     Allergies   Allergen Reactions    Biaxin [Clarithromycin] Diarrhea and Rash    Floxin [Ofloxacin] Diarrhea and Rash       Problem List:    Patient Active Problem List   Diagnosis Code    Degenerative arthropathy of spinal facet joint, Multilevel of the cervical spine M47.819    DDD (degenerative disc disease), cervicalC6-C7 M50.30    Spondylosis, lumbosacral M47.817    Lumbar facet arthropathy M47.816    Lumbar spinal stenosis M48.061    Osteoarthrosis, hip M16.9    Knee pain M25.569    Facet syndrome right C2-C6 M47.899     Degenerative osteoarthrosis right C2-C6 M19.90    Protruded cervical disc M50.20    Cervical radiculopathy M54.12    Neural foraminal stenosis of lumbar spine M48.061    Neural foraminal stenosis of cervical spine M48.02    Osteoarthritis of lumbar spine, 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    Hip fracture requiring operative repair, left, open type I or II, initial encounter ([facility] [address] S72.002B    Severe protein-calorie malnutrition ([facility] [address] E43       Past Medical History:        Diagnosis Date    Arthritis     Osteoporosis     Paget's disease        Past Surgical History:        Procedure Laterality Date    COLON SURGERY      Diverticuli    JOINT REPLACEMENT      left 2010    KNEE SURGERY      Left x 2    LUNG BIOPSY      NERVE BLOCK  11-    NERVE BLOCK  11 16 2011    right cervical paravertebral facet #2    NERVE BLOCK  11 30 2011    NERVE BLOCK  12 28 2012    cervical epidural #1    NERVE BLOCK      1-9-2013    NERVE BLOCK  1 21 13    cerv ep #3    NERVE BLOCK Right 06 12 2013    cervical paravertebral facet #1    NERVE BLOCK Right 6 24 13    cerv facet #2    NERVE BLOCK Right 07 24 2013    cervical paravertebral facet #3    NERVE BLOCK Right 1 15 14    cerv transforam #1    NERVE BLOCK Right 2/19/2014    right cervical transforaminal nerve block  #2    NERVE BLOCK  03/05/14    transforaminal nerve block right cervical #3    NERVE BLOCK Bilateral 10/29/2014    himanshu lumbar paravertebral facet  #1    NERVE BLOCK Bilateral 11/5/2014    bilateral paravertebral facet  lumbar  #2    NERVE BLOCK Bilateral 11/12/14    paravertebral facet lumbar #3    NERVE BLOCK Right 05/22/2017    transforaminal cervical #1    NERVE BLOCK Right 06/05/2017    transforaminal #2    NERVE BLOCK Bilateral 06/12/2017    lumbar paravertebral facet #1    NERVE BLOCK Bilateral 06/21/2017    Bilatera lumbar paravertebral facet block 32 l3-4 l4-5 l5-s1    NERVE BLOCK Bilateral 09/09/2020    lumbar facet block    NERVE BLOCK Bilateral 9/9/2020    BILATERAL LUMBAR FACET BLOCKS AT L3-4,L4-5,L5-S1 x2 UNDER X-RAY #1 performed by Mirna Doan DO at [address] Bilateral 09/16/2020    lumbar facet paravertebral    NERVE BLOCK Bilateral 9/16/2020    BILATERAL LUMBAR FACET BLOCKS AT L3-4,L4-5,L5-S1 x2 UNDER X-RAY #2 performed by Mirna Doan DO at [address] Right 09 20 2013    radiofrequency neurolysis medial branch nerve cervical    TOTAL HIP ARTHROPLASTY      Left       Social History:    Social History     Tobacco Use    Smoking status: Never    Smokeless tobacco: Never   Substance Use Topics    Alcohol use: No                                Counseling given: Not Answered      Vital Signs (Current):   Vitals:    07/18/22 0215 07/18/22 0915 07/18/22 1230 07/18/22 1357   BP: 139/68 138/76 (!) 158/68    Pulse: 89 73 78    Resp:  16 16    Temp:  36.4 °C (97.5 °F) 36.5 °C (97.7 °F)    TempSrc:  Oral Oral    SpO2: 99% 100% 97%    Weight:       Height:    5' 2\" (1.575 m)                                              BP Readings from Last 3 Encounters:   07/18/22 (!) 158/68   01/28/22 (!) 156/82   11/03/20 130/88       NPO Status: Time of last liquid consumption: 1000                        Time of last solid consumption: 1000                        Date of last liquid consumption: 07/13/22                        Date of last solid food consumption: 07/12/22    BMI:   Wt Readings from Last 3 Encounters:   07/13/22 91 lb 3.2 oz (41.4 kg)   01/28/22 93 lb (42.2 kg)   11/03/20 107 lb (48.5 kg)     Body mass index is 16.68 kg/m².     CBC:   Lab Results   Component Value Date/Time    WBC 12.3 07/18/2022 07:43 AM    RBC 2.75 07/18/2022 07:43 AM    HGB 8.7 07/18/2022 11:11 AM    HCT 27.4 07/18/2022 07:43 AM    MCV 99.6 07/18/2022 07:43 AM    RDW 15.3 07/18/2022 07:43 AM     07/18/2022 07:43 AM       CMP:   Lab Results   Component Value Date/Time     07/18/2022 07:43 AM    K 3.6 07/18/2022 07:43 AM     07/18/2022 07:43 AM    CO2 29 07/18/2022 07:43 AM    BUN 47 07/18/2022 07:43 AM    CREATININE 0.7 07/18/2022 07:43 AM    GFRAA >60 07/18/2022 07:43 AM    LABGLOM >60 07/18/2022 07:43 AM    GLUCOSE 107 07/18/2022 07:43 AM    PROT 5.5 07/18/2022 07:43 AM    CALCIUM 9.1 07/18/2022 07:43 AM    BILITOT 0.8 07/18/2022 07:43 AM    ALKPHOS 60 07/18/2022 07:43 AM    AST 30 07/18/2022 07:43 AM    ALT 16 07/18/2022 07:43 AM       POC Tests: No results for input(s): POCGLU, POCNA, POCK, POCCL, POCBUN, POCHEMO, POCHCT in the last 72 hours. Coags:   Lab Results   Component Value Date/Time    PROTIME 12.8 07/18/2022 07:43 AM    INR 1.1 07/18/2022 07:43 AM    APTT 24.1 07/14/2022 01:05 AM       HCG (If Applicable): No results found for: PREGTESTUR, PREGSERUM, HCG, HCGQUANT     ABGs: No results found for: PHART, PO2ART, HMC4JMF, UXV5HHW, BEART, P4ANAKSQ     Type & Screen (If Applicable):  No results found for: LABABO, LABRH    Drug/Infectious Status (If Applicable):  No results found for: HIV, HEPCAB    COVID-19 Screening (If Applicable):   Lab Results   Component Value Date/Time    COVID19 Not Detected 09/11/2020 10:44 AM           Anesthesia Evaluation  Patient summary reviewed and Nursing notes reviewed  Airway: Mallampati: II  TM distance: >3 FB   Neck ROM: full  Mouth opening: > = 3 FB   Dental:    (+) poor dentition      Pulmonary:                              Cardiovascular:            Rhythm: regular  Rate: normal                    Neuro/Psych:   (+) neuromuscular disease:,             GI/Hepatic/Renal:             Endo/Other:    (+) blood dyscrasia: arthritis:., .                 Abdominal:             Vascular: Other Findings:           Anesthesia Plan      MAC     ASA 3             Anesthetic plan and risks discussed with patient. Plan discussed with attending.                     Felicita Walsh RN   7/18/2022

## 2022-07-18 NOTE — PLAN OF CARE
Problem: Pain  Goal: Verbalizes/displays adequate comfort level or baseline comfort level  7/18/2022 1924 by Corin Ellison RN  Outcome: Progressing Towards Goal     Problem: Safety - Adult  Goal: Free from fall injury  7/18/2022 1924 by Corin Ellison RN  Outcome: Progressing Towards Goal     Problem: ABCDS Injury Assessment  Goal: Absence of physical injury  Outcome: Progressing Towards Goal     Problem: Skin/Tissue Integrity  Goal: Absence of new skin breakdown  Description: 1. Monitor for areas of redness and/or skin breakdown  2. Assess vascular access sites hourly  3. Every 4-6 hours minimum:  Change oxygen saturation probe site  4. Every 4-6 hours:  If on nasal continuous positive airway pressure, respiratory therapy assess nares and determine need for appliance change or resting period.   Outcome: Progressing Towards Goal

## 2022-07-18 NOTE — PROGRESS NOTES
Physical Therapy    Physical Therapy Treatment Note/Plan of Care    Room #:  2921/1016-16  Patient Name: Jeremias Bruno  YOB: 1928  MRN: [id number]    Date of Service: 7/18/2022     Tentative placement recommendation: subacute rehab   Equipment recommendation: To be determined      Evaluating Physical Therapist: Lucia Lopes, [address] [facility]  #[id number]     Specific Provider Orders/Date/Referring Provider :  07/16/22 0900    PT eval and treat  Start:  07/16/22 0900,   End:  07/16/22 0900,   ONE TIME,   Standing Count:  1 Occurrences,   Cecile Daly MD     Admitting Diagnosis:   Closed fracture of right hip, initial encounter [facility]) [S72.001A]  Hip fracture requiring operative repair, left, open type I or II, initial encounter ([facility] [address] [S72.002B]  Fall on same level from slipping, tripping or stumbling, initial encounter [W01. 0XXA]  Traumatic rhabdomyolysis, initial encounter ([facility] [address] Drenda Pleas. 6XXA]   Visit diagnosis:   Visit Diagnoses         Codes    Closed fracture of right hip, initial encounter [facility])    -  Primary S72.001A    Fall on same level from slipping, tripping or stumbling, initial encounter     W01. 0XXA    Traumatic rhabdomyolysis, initial encounter ([facility] [address]     T79. 6XXA    Closed fracture of left hip, initial encounter [facility])     S72.002A        Admitted with  fall, unknown time on floor 7/13    Surgery:     right   INTROCHANTERIC CEPHALOMEDULLARY NAIL  INSERTION    Patient Active Problem List   Diagnosis    Degenerative arthropathy of spinal facet joint, Multilevel of the cervical spine    DDD (degenerative disc disease), cervicalC6-C7    Spondylosis, lumbosacral    Lumbar facet arthropathy    Lumbar spinal stenosis    Osteoarthrosis, hip    Knee pain    Facet syndrome right C2-C6     Degenerative osteoarthrosis right C2-C6    Protruded cervical disc    Cervical radiculopathy    Neural foraminal stenosis of lumbar spine    Neural foraminal stenosis of cervical spine    Osteoarthritis of lumbar spine, degenerative with facet syndrome    Hip fracture requiring operative repair, left, open type I or II, initial encounter ([facility] [address]       ASSESSMENT of current deficits: Patient exhibits decreased strength, balance, endurance, range of motion, coordination, and pain    impairing functional mobility, transfers, gait , gait distance, and tolerance to activity are barriers to d/c and require skilled intervention during hospital stay to attain pre hospital level of function. Patient needing maximal assist for all functional mobility due to pain and getting her to follow my cueing. Pt had a posterior lean in standing which required maximal assist from me to get her to the chair safely. On her second stand from the chair, she was able to perform hip extension and get more of a upright position. PHYSICAL THERAPY  PLAN OF CARE       Physical therapy plan of care is established based on physician order,  patient diagnosis and clinical assessment    Current Treatment Recommendations:    -Bed Mobility: Lower extremity exercises , Upper extremity exercises , and Trunk control activities   -Sitting Balance: Incorporate reaching activities to activate trunk muscles , Hands on support to maintain midline , and Facilitate postural control in all planes   -Standing Balance: Perform strengthening exercises in standing to promote motor control with or without upper extremity support  and Instruct patient on adequate base of support to maintain balance  -Transfers: Provide instruction on proper hand and foot position for adequate transfer of weight onto lower extremities and use of gait device if needed, Cues for hand placement, technique and safety.  Provide stabilization to prevent fall , and Facilitate weight shift forward on to lower extremities and provide necessary stabilization of bilateral lower extremities   -Gait: Gait training, Standing activities to improve: base of support, weight shift, weight bearing , and Exercises to improve hip and knee control   -Endurance: Utilize Supervised activities to increase level of endurance to allow for safe functional mobility including transfers and gait     PT long term treatment goals are located in below grid    Patient and or family understand(s) diagnosis, prognosis, and plan of care. Frequency of treatments: Patient will be seen  twice daily  for therapeutic exercise, functional retraining, endurance activities, balance exercises, family and patient education.        Prior Level of Function: Patient ambulated independently and climbed steps  Rehab Potential: good   for baseline    Past medical history:   Past Medical History:   Diagnosis Date    Arthritis     Osteoporosis     Paget's disease      Past Surgical History:   Procedure Laterality Date    COLON SURGERY      Diverticuli    JOINT REPLACEMENT      left 2010    KNEE SURGERY      Left x 2    LUNG BIOPSY      NERVE BLOCK  11-    NERVE BLOCK  11 16 2011    right cervical paravertebral facet #2    NERVE BLOCK  11 30 2011    NERVE BLOCK  12 28 2012    cervical epidural #1    NERVE BLOCK      1-9-2013    NERVE BLOCK  1 21 13    cerv ep #3    NERVE BLOCK Right 06 12 2013    cervical paravertebral facet #1    NERVE BLOCK Right 6 24 13    cerv facet #2    NERVE BLOCK Right 07 24 2013    cervical paravertebral facet #3    NERVE BLOCK Right 1 15 14    cerv transforam #1    NERVE BLOCK Right 2/19/2014    right cervical transforaminal nerve block  #2    NERVE BLOCK  03/05/14    transforaminal nerve block right cervical #3    NERVE BLOCK Bilateral 10/29/2014    himanshu lumbar paravertebral facet  #1    NERVE BLOCK Bilateral 11/5/2014    bilateral paravertebral facet  lumbar  #2    NERVE BLOCK Bilateral 11/12/14    paravertebral facet lumbar #3    NERVE BLOCK Right 05/22/2017    transforaminal cervical #1    NERVE BLOCK Right 06/05/2017    transforaminal #2    NERVE BLOCK Bilateral 06/12/2017    lumbar paravertebral facet #1    NERVE BLOCK Bilateral 06/21/2017    Bilatera lumbar paravertebral facet block 32 l3-4 l4-5 l5-s1    NERVE BLOCK Bilateral 09/09/2020    lumbar facet block    NERVE BLOCK Bilateral 9/9/2020    BILATERAL LUMBAR FACET BLOCKS AT L3-4,L4-5,L5-S1 x2 UNDER X-RAY #1 performed by Mena Dietz DO at [address] Bilateral 09/16/2020    lumbar facet paravertebral    NERVE BLOCK Bilateral 9/16/2020    BILATERAL LUMBAR FACET BLOCKS AT L3-4,L4-5,L5-S1 x2 UNDER X-RAY #2 performed by Mena Dietz DO at [address] Right 09 20 2013    radiofrequency neurolysis medial branch nerve cervical    TOTAL HIP ARTHROPLASTY      Left         SUBJECTIVE:    Precautions:  Continuous Pulse Oximetry , falls, alarm, and O2 ,right lower extremity FWB (full weight bearing)     Social history: Patient lives with daughter in a  3 story home in [facility] on 3rd level   with  multiple steps   to enter with [address]: [address] [place] [address]   How much difficulty turning over in bed?: A Lot  How much difficulty sitting down on / standing up from a chair with arms?: A Lot  How much difficulty moving from lying on back to sitting on side of bed?: A Lot  How much help from another person moving to and from a bed to a chair?: A Lot  How much help from another person needed to walk in hospital room?: A Lot  How much help from another person for climbing 3-5 steps with a railing?: Total  AM-PAC Inpatient Mobility Raw Score : 11  AM-PAC Inpatient T-Scale Score : 33.86  Mobility Inpatient CMS 0-100% Score: 72.57  Mobility Inpatient CMS G-Code Modifier : CL    Nursing cleared patient for PT treatment. OBJECTIVE:   Initial Evaluation  Date: 7/17/2022 Treatment Date:  7/18/2022   Short Term/ Long Term   Goals   Was pt agreeable to Eval/treatment?  Yes yes    Pain Level  Unrated however with movement of  Right hip grimace and vocalized pain 10/10  Right hip     Bed Mobility  Rolling: Maximal assist of 1    Supine to sit: Maximal assist of 1    Sit to supine: Maximal assist of 1    Scooting: Maximal assist of 1   Rolling: Maximal assist of 1   Supine to sit: Maximal assist of 1   Sit to supine: Not assessed    Scooting: Maximal assist of 1    Rolling: Independent    Supine to sit:  Independent    Sit to supine: Independent    Scooting: Independent     Transfers Sit to stand: Maximal assist of 1 anterior approach x 2 reps; attempted with walker however patient fearful; patient able to bear wt bilateral however 2 person assist for wheeled walker trf will be necessary next visit Sit to stand: Maximal assist of 1 Cues for hand placement and safety     Sit to stand: Modified Independent     Ambulation    not assessed  3 feet using  wheeled walker with Maximal assist of 1   cues for sequencing, FWB (full weight bearing) weight bearing right lower extremity, upright posture, walker approximation, increased base of support, increased step length, and safety   100 feet using  wheeled walker with Modified Independent    ROM Within functional limits except Right hip 50%, pt has significant internal rotation with inability to tolerate > neutral    Increase range of motion 10% of affected joints    Strength Within functional limits  except  Right lower extremity 2-/5    Within functional limits   Balance Sitting EOB:  poor   Dynamic Standing:  poor    Sitting EOB: fair minus offloading right hip  Dynamic Standing: poor posterior lean with wheeled walker   Sitting EOB:  good    Dynamic Standing:  good wheeled walker      Patient is Alert & Oriented x person, place, time, and situation and follows one step directions  hard of hearing   Sensation:  Patient denies numbness/tingling  Edema:  yes right lower extremity   Vitals: 3 Liters of o2 via nasal cannula   Blood Pressure at rest   Blood Pressure during session     Heart Rate at rest   Heart Rate during session SPO2 at rest 97%  SPO2 during session %     Patient education   Patient educated on role of Physical Therapy, risks of immobility, safety and plan of care, importance of positional changes for oxygen exchange,  importance of mobility while in hospital , ankle pumps, quad set and glut set for edema control, blood clot prevention, importance and purpose of adaptive device and adjusted to proper height for the patient. , safety , weight bearing status , and need for neutral alignment in supine for proper healing       Patient response to education:   Pt verbalized understanding Pt demonstrated skill Pt requires further education in this area   Yes Partial Yes       Treatment:  Patient practiced and was instructed in the following treatment:     Therapist educated and facilitated patient on techniques to increase safety and independence with bed mobility, balance, functional transfers, and functional mobility. Patient performed ankle pumps, quad sets, glut sets x 15-20 each. Active assist heelslide, hip abduction/adduction, straight leg raise x 15 each a/a    Sat edge of bed 11 minutes with Minimal assist of 1 to increase dynamic sitting balance and activity tolerance. Working on sitting balance due to left lateral lean to offload her right hip. Pt stood, posterior lean, tactile cueing for increased hip extension, took steps to the chair. Stood again to work on standing balance and upright posture. At end of session, patient in chair with alarm call light and phone within reach,   all lines and tubes intact, nursing notified. Patient would benefit from continued skilled Physical Therapy to improve functional independence and quality of life. Patient's/ family goals   rehab      Patient and or family understand(s) diagnosis, prognosis, and plan of care.        Frequency of treatments: Patient will be seen  twice daily  for therapeutic exercise, functional retraining, endurance activities, balance exercises, family and patient education. Time in 09:27  Time out 09:55    Total Treatment Time  28 minutes        CPT codes:    Therapeutic activities (08059)   16 minutes  1 unit(s)  Therapeutic exercises (19171)   12 minutes  1 unit(s)    Surya Reynolds  [facility]  LIC # [id number]

## 2022-07-18 NOTE — H&P
Immediately prior to the procedure the patient's History and Physical was reviewed- there are no changes with the current vitals . Blood pressure (!) 158/68, pulse 78, temperature 97.7 °F (36.5 °C), temperature source Oral, resp. rate 16, height 5' 2\" (1.575 m), weight 91 lb 3.2 oz (41.4 kg), SpO2 97 %, not currently breastfeeding.

## 2022-07-18 NOTE — PLAN OF CARE
Problem: Pain  Goal: Verbalizes/displays adequate comfort level or baseline comfort level  7/17/2022 2358 by Conner Lee RN  Outcome: Progressing     Problem: Safety - Adult  Goal: Free from fall injury  7/17/2022 2358 by Conner Lee RN  Outcome: Progressing     Problem: ABCDS Injury Assessment  Goal: Absence of physical injury  7/17/2022 2358 by Conner Lee RN  Outcome: Progressing  7/17/2022 2353 by Conner Lee RN  Outcome: Progressing     Problem: Skin/Tissue Integrity  Goal: Absence of new skin breakdown  Description: 1. Monitor for areas of redness and/or skin breakdown  2. Assess vascular access sites hourly  3. Every 4-6 hours minimum:  Change oxygen saturation probe site  4. Every 4-6 hours:  If on nasal continuous positive airway pressure, respiratory therapy assess nares and determine need for appliance change or resting period.   7/17/2022 2358 by Conner Lee RN  Outcome: Progressing

## 2022-07-18 NOTE — PROGRESS NOTES
[address] [facility]  [facility] [place]. [place]        Date:2022                                                  Patient Name: Kenny Valentine    MRN: [id number]    : 10/17/1928    Room: [facility]97      Evaluating OT: Jessica Coe OTR/L #[id number]     Referring Provider and Specific Provider Orders/Date:      22  OT eval and treat  Start:  22,   End:  22 09,   ONE TIME,   Standing Count:  1 Occurrences,   R        Order went unreviewed at transfer on Sun 2022  1:59 PM    Kathy Lara MD      Placement Recommendation: Subacute Rehab        Diagnosis:   1. Closed fracture of right hip, initial encounter ([facility] [address]    2. Fall on same level from slipping, tripping or stumbling, initial encounter    3.  Traumatic rhabdomyolysis, initial encounter ([facility] [address]    4. Closed fracture of left hip, initial encounter [facility])           Surgery: S/p RIGHT INTROCHANTERIC CEPHALOMEDULLARY NAIL  INSERTION 7/15/22 by Dr. Domingo José      Pertinent Medical History:       Past Medical History:   Diagnosis Date    Arthritis     Osteoporosis     Paget's disease          Past Surgical History:   Procedure Laterality Date    COLON SURGERY      Diverticuli    JOINT REPLACEMENT      left 2010    KNEE SURGERY      Left x 2    LUNG BIOPSY      NERVE BLOCK  2011    NERVE BLOCK  11 16     right cervical paravertebral facet #2    NERVE BLOCK  11 30     NERVE BLOCK  12 28     cervical epidural #1    NERVE BLOCK      -    NERVE BLOCK  1 21 13    cerv ep #3    NERVE BLOCK Right 06 12     cervical paravertebral facet #1    NERVE BLOCK Right 6 24 13    cerv facet #2    NERVE BLOCK Right 07 24 2013    cervical paravertebral facet #3    NERVE BLOCK Right 1 15 14    cerv transforam #1    NERVE BLOCK Right 2014    right cervical transforaminal nerve block  #2    NERVE BLOCK  14 transforaminal nerve block right cervical #3    NERVE BLOCK Bilateral 10/29/2014    himanshu lumbar paravertebral facet  #1    NERVE BLOCK Bilateral 11/5/2014    bilateral paravertebral facet  lumbar  #2    NERVE BLOCK Bilateral 11/12/14    paravertebral facet lumbar #3    NERVE BLOCK Right 05/22/2017    transforaminal cervical #1    NERVE BLOCK Right 06/05/2017    transforaminal #2    NERVE BLOCK Bilateral 06/12/2017    lumbar paravertebral facet #1    NERVE BLOCK Bilateral 06/21/2017    Bilatera lumbar paravertebral facet block 32 l3-4 l4-5 l5-s1    NERVE BLOCK Bilateral 09/09/2020    lumbar facet block    NERVE BLOCK Bilateral 9/9/2020    BILATERAL LUMBAR FACET BLOCKS AT L3-4,L4-5,L5-S1 x2 UNDER X-RAY #1 performed by Cristobal Devlin DO at [address] [facility] Bilateral 09/16/2020    lumbar facet paravertebral    NERVE BLOCK Bilateral 9/16/2020    BILATERAL LUMBAR FACET BLOCKS AT L3-4,L4-5,L5-S1 x2 UNDER X-RAY #2 performed by Cristobal Devlin DO at [address] [address] Right 09 20 2013    radiofrequency neurolysis medial branch nerve cervical    TOTAL HIP ARTHROPLASTY      Left      Precautions:  Fall Risk, full weight bearing as tolerated right LE,  O2 , 2L via nasal canula      Assessment of current deficits    [x] Functional mobility  [x]ADLs  [x] Strength               [x]Cognition    [x] Functional transfers   [x] IADLs         [x] Safety Awareness   [x]Endurance    [] Fine Coordination              [x] Balance      [] Vision/perception   []Sensation     []Gross Motor Coordination  [] ROM  [] Delirium                   [] Motor Control     OT PLAN OF CARE   OT POC based on physician orders, patient diagnosis and results of clinical assessment    Frequency/Duration 2-5 days/wk for 2 weeks BID PRN     Specific OT Treatment Interventions to include:   * Instruction/training on adapted ADL techniques and AE recommendations to increase functional independence within precautions * Training on energy conservation strategies, correct breathing pattern and techniques to improve independence/tolerance for self-care routine  * Functional transfer/mobility training/DME recommendations for increased independence, safety, and fall prevention  * Patient/Family education to increase follow through with safety techniques and functional independence  * Recommendation of environmental modifications for increased safety with functional transfers/mobility and ADLs  * Cognitive retraining/development of therapeutic activities to improve problem solving, judgement, memory, and attention for increased safety/participation in ADL/IADL tasks  * Therapeutic exercise to improve motor endurance, ROM, and functional strength for ADLs/functional transfers  * Therapeutic activities to facilitate/challenge dynamic balance, stand tolerance for increased safety and independence with ADLs  * Positioning to improve skin integrity, interaction with environment and functional independence    Recommended Adaptive Equipment: TBD      Home Living: Lives with daughter, 3rd floor apartment with flights of stairs and rails. Bathroom set-up: tub/shower, shower chair        Equipment owned: cane, wheeled walker     Prior Level of Function: Pt reportedly independent with ADLs and most IADLs but daughter assists as needed; ambulated independently with cane. Driving: No     Pain Level: Pt reports 9/10 pain in right LE; Nursing notified.       Cognition: A&O: 3/4; Follows 2 step directions; pt lethargic but became increasingly alert at EOB    Memory: fair   Sequencing: fair    Problem solving: safia Mancia minus   Judgement/safety: safia Mancia minus     [facility]   AM-PAC Daily Activity Inpatient   How much help for putting on and taking off regular lower body clothing?: Total  How much help for Bathing?: A Lot  How much help for Toileting?: Total  How much help for putting on and taking off regular upper body clothing?: A Lot  How much help for taking care of personal grooming?: A Little  How much help for eating meals?: Total  AM-PAC Inpatient Daily Activity Raw Score: 10  AM-PAC Inpatient ADL T-Scale Score : 27.31  ADL Inpatient CMS 0-100% Score: 74.7  ADL Inpatient CMS G-Code Modifier : CL     Functional Assessment:    Initial Eval Status  Date: 7/18/22   Treatment Status  Date: STGs = LTGs  Time frame: 10-14 days   Feeding Dependent   NPO at time of eval; plans for PEG tube placement    N/A    Grooming Minimal Assist     Supervision    UB Dressing Moderate Assist    Supervision    LB Dressing Dependent     Moderate Assist    Bathing Maximal Assist     Minimal Assist    Toileting Dependent   Puriwick catheter in place     Moderate Assist    Bed Mobility  Supine to sit: Maximal Assist x 1   Sit to supine: Moderate Assist x 2     Supine to sit: Minimal Assist   Sit to supine: Minimal Assist    Functional Transfers Sit to stand: Moderate Assist  Stand to sit: Moderate Assist     Transfer training with verbal cues for hand/feet placement throughout session to improve safety. Minimal Assist    Functional Mobility Minimal Assist x 2 with 2-3 small side steps along side of bed to improve balance, verbal cues for walker sequence and safety.      Minimal Assist with use of wheeled walker    Balance Sitting:     Static: fair    Dynamic: fair/fair minus  Standing: fair minus/poor     Sitting:     Static: good    Dynamic: good  Standing: fair plus    Activity Tolerance fair    Increase standing tolerance >2-3 minutes for improved engagement with functional transfers and indep in ADLs     Visual/  Perceptual Glasses: Yes     Reports changes in vision since admission: N/A     NA      Hand Dominance:      AROM (PROM) Strength Additional Info:  Goal:   RUE  WFL 3+/5 good  and wfl FMC/dexterity noted during ADL tasks   Improve overall RUE strength  for participation in functional tasks   LUE WFL 3+/5 good  and wfl FMC/dexterity noted during ADL tasks   Improve overall LUE strength  for participation in functional tasks     Hearing:  [facility]   Sensation: No c/o numbness or tingling  Tone:  WFL   Edema: N/a     Comments: RN cleared patient for OT. Upon arrival patient in supine, daughter at bedside. Therapist facilitated and instructed pt on adapted  techniques & compensatory strategies to improve safety and independence with basic ADLs, bed mobility, functional transfers and mobility to allow pt to achieve highest level of independence and safely. Pt demonstrated fair understanding of education & follow through. At end of session, patient was supine with call light and phone within reach, all lines and tubes intact. Overall, patient demonstrated  decreased independence and safety during completion of ADL tasks. Pt would benefit from continued skilled OT to increase safety and independence with completion of ADL tasks and functional mobility for improved quality of life. Treatment: OT treatment provided this date includes:   Instructions/training on safety, sequencing, and adapted techniques for completion of ADLs. Facilitated bed mobility with cues for proper body mechanics and sequencing to prepare for ADL completion. Instruction/training on safe functional mobility/transfer techniques including hand and feet placement   Facilitated proper positioning/alignment to improve interaction with environment, breathing, overall functioning and decrease/prevent edema. Rehab Potential: Good for established goals. Patient / Family Goal: Pt/family in agreement with POC      Patient and/or family were instructed on functional diagnosis, prognosis/goals and OT plan of care. Demonstrated fair understanding.      Eval Complexity: Low    Time In: 11:25 AM   Time Out: 11:55 AM    Total Treatment Time: 10      Min Units   OT Eval Low 97165  X  1    OT Eval Medium 73191      OT Eval High 79741      OT Re-Eval [id number]            ADL/Self Care 07805 Therapeutic Activities 04178  10 1   Therapeutic Ex [id number]       Orthotic Management 50884       Manual [id number]     Neuro Re-Ed [id number]       Non-Billable Time        Evaluation Time additionally includes thorough review of current medical information, gathering information on past medical history/social history and prior level of function, interpretation of standardized testing/informal observation of tasks, assessment of data and development of plan of care and goals.         Evaluating OT: Suni Desouza OTR/L #[id number]

## 2022-07-18 NOTE — CONSULTS
Comprehensive Nutrition Assessment    Type and Reason for Visit:  Initial, Consult (TF O&M)     Nutrition Recommendations/Plan:   Will Order and Manage TF recommendations:     Standard w/ Fiber @ 40ml/hr + 1 pro-modular daily + [id number] water flush to provide: 960ml TV, 1440 calories, 61g pro, 730ml water, 1430ml total water  With daily pro-modular: 1544 total calories, 87g pro  Regimen meets 100% increased estimated nutrient needs with severe malnutrition. Patient should be considered at increase risk for metabolic complications related to refeeding, characterized by drops in serum K,Mg/Phos levels. These parameters should be closely monitored and be WNL prior to initiation or progression of feeding. Malnutrition Assessment:  Malnutrition Status:  Severe malnutrition (07/18/22 1403)    Context:  Chronic Illness     Findings of the 6 clinical characteristics of malnutrition:  Energy Intake:  75% or less estimated energy requirements for 1 month or longer  Weight Loss:  Unable to assess     Body Fat Loss:  Severe body fat loss Buccal region, Orbital   Muscle Mass Loss:  Severe muscle mass loss Clavicles (pectoralis & deltoids), Temples (temporalis)  Fluid Accumulation:  No significant fluid accumulation     Strength:  Not Performed    Nutrition Assessment:    Pt adm d/t R hip pain/fx s/p ORIF w/ cephalomedullary nail (7/15). Note severe malnutrition w/ Dysphagia/asp s/p failed MBSS, currently pending PEG placement. Will order/manage TF recs.     Nutrition Related Findings:    A&Ox4, flat/soft abd, +BS, pending PEG, +2.6L I/Os, trace edema  Wound Type: Multiple, Skin Tears, Surgical Incision       Current Nutrition Intake & Therapies:    Average Meal Intake: NPO  Average Supplements Intake: NPO  Diet NPO Exceptions are: Sips of Water with Meds    Anthropometric Measures:  Height: 5' 2\" (157.5 cm)  Ideal Body Weight (IBW): 110 lbs (50 kg)    Admission Body Weight: 91 lb 3.2 oz (41.4 kg)  Current Body Weight: 91 lb 3.2 oz (41.4 kg) (7/13 bed scale, CBW 106lbs. per RD ecounter (7/18) likely elevated w/ bed accessories), 82.9 % IBW.     Current BMI (kg/m2): 16.7  Usual Body Weight:  (lack measured wt hx per EMR)  Weight Adjustment For: No Adjustment  BMI Categories: Underweight (BMI less than 22) age over 72    Estimated Daily Nutrient Needs:  Energy Requirements Based On: Kcal/kg  Weight Used for Energy Requirements: Admission  Energy (kcal/day): 0973-9402  Weight Used for Protein Requirements: Admission  Protein (g/day): 75-85  Method Used for Fluid Requirements: 1 ml/kcal  Fluid (ml/day): 5595-7009    Nutrition Diagnosis:   Severe malnutrition, In context of chronic illness related to cognitive or neurological impairment as evidenced by Criteria as identified in malnutrition assessment, severe loss of subcutaneous fat, severe muscle loss, poor intake prior to admission, intake 0-25%    Nutrition Interventions:   Food and/or Nutrient Delivery: Continue NPO, Start Tube Feeding (Standard w/ Fiber @ 40ml/hr + 1 pro-modular daily + [id number] water flush to provide: 960ml TV, 1440 calories, 61g pro, 730ml water (1544 total calories, 87g pro, 1430ml total water))  Nutrition Education/Counseling: No recommendation at this time  Coordination of Nutrition Care: Continue to monitor while inpatient    Goals:  Goals: Initiate nutrition support, Tolerate nutrition support at goal rate    Nutrition Monitoring and Evaluation:   Behavioral-Environmental Outcomes: None Identified  Food/Nutrient Intake Outcomes: Enteral Nutrition Intake/Tolerance  Physical Signs/Symptoms Outcomes: Biochemical Data, Nutrition Focused Physical Findings, Skin, Weight, Chewing or Swallowing, GI Status, Fluid Status or Edema    Discharge Planning:    Enteral Nutrition     Joel Velez, MS, RD, LD  Contact: [phone]

## 2022-07-18 NOTE — PROGRESS NOTES
Department of Orthopedic Surgery  Resident Progress Note    Patient seen and examined. Her pain is well controlled, she has no complaints. Did have an aspiration event, family decided to have PEG tube placed, scheduled for today.     VITALS:  /68   Pulse 89   Temp 98.6 °F (37 °C) (Oral)   Resp 18   Ht 5' 2\" (1.575 m)   Wt 91 lb 3.2 oz (41.4 kg)   SpO2 99%   BMI 16.68 kg/m²     General: Awake and alert, no acute distress    MUSCULOSKELETAL:   right lower extremity:  Resting over right hip, strikethrough but all blood contained within Aquacel  Compartments soft and compressible  +PF/DF/EHL  +2/4 DP & PT pulses, Brisk Cap refill, Toes warm and perfused  Distal sensation grossly intact to Peroneals, Sural, Saphenous, and tibial nrs    CBC:   Lab Results   Component Value Date/Time    WBC 10.1 07/17/2022 09:13 AM    HGB 7.7 07/18/2022 03:55 AM    HCT 26.8 07/17/2022 09:13 AM     07/17/2022 09:13 AM     PT/INR:    Lab Results   Component Value Date/Time    PROTIME 12.3 07/14/2022 01:05 AM    INR 1.1 07/14/2022 01:05 AM         ASSESSMENT  S/P right hip ORIF with cephalomedullary nail on 7/15    PLAN      Continue physical therapy and protocol: WBAT -R LE  Deep venous thrombosis prophylaxis -ok to resume today per primary, early mobilization  PT/OT  Pain Control: IV and PO  D/C Plan: Likely SNF at [facility] pending PT/OT evaluation

## 2022-07-18 NOTE — PROGRESS NOTES
Cardiology  Progress Note      SUBJECTIVE:  No chest pain. No dyspnea. Very weak. Current Inpatient Medications  Current Facility-Administered Medications: sodium chloride flush 0.9 % injection 5-40 mL, 5-40 mL, IntraVENous, 2 times per day  sodium chloride flush 0.9 % injection 5-40 mL, 5-40 mL, IntraVENous, PRN  0.9 % sodium chloride infusion, 25 mL, IntraVENous, PRN  pantoprazole (PROTONIX) injection 40 mg, 40 mg, IntraVENous, Daily  dextrose 5 % solution, , IntraVENous, Continuous  metoprolol (LOPRESSOR) injection 5 mg, 5 mg, IntraVENous, Q6H  cefTRIAXone (ROCEPHIN) 1,000 mg in sterile water 10 mL IV syringe, 1,000 mg, IntraVENous, Q24H  sodium chloride flush 0.9 % injection 5-40 mL, 5-40 mL, IntraVENous, 2 times per day  sodium chloride flush 0.9 % injection 5-40 mL, 5-40 mL, IntraVENous, PRN  0.9 % sodium chloride infusion, , IntraVENous, PRN  fentaNYL (SUBLIMAZE) injection 25 mcg, 25 mcg, IntraVENous, Q5 Min PRN  fentaNYL (SUBLIMAZE) injection 50 mcg, 50 mcg, IntraVENous, Q5 Min PRN  meperidine (DEMEROL) injection 12.5 mg, 12.5 mg, IntraVENous, Q5 Min PRN  aspirin chewable tablet 81 mg, 81 mg, Oral, BID WC  morphine (PF) injection 2 mg, 2 mg, IntraVENous, Q2H PRN **OR** morphine injection 4 mg, 4 mg, IntraVENous, Q2H PRN  oxyCODONE (ROXICODONE) immediate release tablet 5 mg, 5 mg, Oral, Q4H PRN **OR** oxyCODONE (ROXICODONE) immediate release tablet 10 mg, 10 mg, Oral, Q4H PRN      Physical  VITALS:  /68   Pulse 89   Temp 98.6 °F (37 °C) (Oral)   Resp 18   Ht 5' 2\" (1.575 m)   Wt 91 lb 3.2 oz (41.4 kg)   SpO2 99%   BMI 16.68 kg/m²   CURRENT TEMPERATURE:  Temp: 98.6 °F (37 °C)  CONSTITUTIONAL: No acute distress. EYES: Vision is intact. ENT: No sore throat. No ear drainage. NECK: No JVD. BACK: Symmetric. LUNGS:  diminished breath sounds right base and left base  CARDIOVASCULAR:  normal S1 and S2, no S3, and no S4  ABDOMEN:  non-distended  NEUROLOGIC: No focal deficits.     EXTREMITIES: No edema cyanosis or clubbing. DATA:        Cardiology Labs:  BMP:    Lab Results   Component Value Date/Time     07/18/2022 07:43 AM    K 3.6 07/18/2022 07:43 AM     07/18/2022 07:43 AM    CO2 29 07/18/2022 07:43 AM    BUN 47 07/18/2022 07:43 AM     CBC:    Lab Results   Component Value Date/Time    WBC 12.3 07/18/2022 07:43 AM    RBC 2.75 07/18/2022 07:43 AM    HGB 8.0 07/18/2022 07:43 AM    HCT 27.4 07/18/2022 07:43 AM    MCV 99.6 07/18/2022 07:43 AM    RDW 15.3 07/18/2022 07:43 AM     07/18/2022 07:43 AM     PT/INR:  No results found for: PTINR  TROPONIN:  No components found for: TROP    ASSESSMENT    1.right hip fracture. Patient underwent surgery and she tolerated that fairly well from cardiac standpoint. 2.sinus tachycardia with PACs. Metoprolol p.o. was switched to IV as patient failed her swallow study. Heart rate is not well controlled. Dose of metoprolol IV was increased for better control of heart rate and in view of her atrial arrhythmias. 3.aspiration problem. Family decided to proceed with PEG tube. Long discussion with the family last evening about all above. All their questions were answered. PLAN  As per orders.

## 2022-07-18 NOTE — PROGRESS NOTES
Physician Progress Note      Seven Delcid  CSN #:                  [id number]  :                       10/17/1928  ADMIT DATE:       2022 6:59 PM  DISCH DATE:  RESPONDING  PROVIDER #:        Lexie Templeton MD        QUERY TEXT:    Type of Anemia: Please provide further specificity, if known. Clinical indicators include: malignancy, anemia, 2 units of prbc, hg  Options provided:  -- Anemia due to acute blood loss  -- Anemia due to chronic blood loss  -- Anemia due to iron deficiency  -- Anemia due to postoperative blood loss  -- Anemia due to chronic disease  -- Other - I will add my own diagnosis  -- Disagree - Not applicable / Not valid  -- Disagree - Clinically Unable to determine / Unknown        PROVIDER RESPONSE TEXT:    The patient has anemia due to postoperative blood loss. 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 If possible, please document in progress notes and discharge   summary if you are evaluating and/or treating any of the following: The medical record reflects the following:  Risk Factors: Elderly, Hx osteoporosis, pagets disease . DJD, Fall  Clinical Indicators: The fall occurred while walking Standing height. She   landed on a hard floor  Treatment: IM nail surgical repair. Vitamin D    Thank you,  Pamela Ivy RN BSN CCDS  Options provided:  -- Osteoporotic Right  IT hip  fracture following fall which would not usually   break a normal, healthy bone  -- Traumatic Right  IT hip fracture  -- Other - I will add my own diagnosis  -- Disagree - Not applicable / Not valid  -- Disagree - Clinically unable to determine / Unknown  -- Refer to Clinical Documentation Reviewer    PROVIDER RESPONSE TEXT:    This patient has a traumatic right IT hip fracture.     Query created by: Rosalba Ozuna on 2022 12:43 PM      Electronically signed by:  Lexie Templeton MD 2022

## 2022-07-18 NOTE — PROGRESS NOTES
[facility]  [facility]  Progress Note    NAME: Juan Pablo Garcia  MR:  [id number]  :   10/17/1928  DATE OF SERVICE:22    This is a face to face encounter with Juan Pablo Garcia 80 y.o. female on 22  Elements of this note, including Diagnosis,  Interval History, Past Medical/Surgical/Family/Social Histories, ROS, physical exam, and Assessment and Plan were copied and pasted from Previous. Updates have been made where noted and reflect current exam and medical decision making from the DOS of this encounter. CHIEF COMPLAINT     ID following for   Chief Complaint   Patient presents with    Fall     HISTORY OF PRESENT ILLNESS     Pt seen and examined  22  In bed asleep briefly arousable     Patient is tolerating medications. No reported adverse drug reactions. REVIEW OF SYSTEMS     As stated above in the chief complaint, otherwise negative. CURRENT MEDICATIONS      ceFAZolin  2,000 mg IntraVENous On Call    sodium chloride flush  5-40 mL IntraVENous 2 times per day    pantoprazole  40 mg IntraVENous Daily    metoprolol  5 mg IntraVENous Q6H    cefTRIAXone (ROCEPHIN) IV  1,000 mg IntraVENous Q24H    sodium chloride flush  5-40 mL IntraVENous 2 times per day    aspirin  81 mg Oral BID WC     Continuous Infusions:   sodium chloride      dextrose 50 mL/hr at 22 1247    sodium chloride       PRN Meds:sodium chloride flush, sodium chloride, sodium chloride flush, sodium chloride, fentanNYL, fentanNYL, meperidine, morphine **OR** morphine, oxyCODONE **OR** oxyCODONE    PHYSICAL EXAM     BP (!) 158/68   Pulse 78   Temp 97.7 °F (36.5 °C) (Oral)   Resp 16   Ht 5' 2\" (1.575 m)   Wt 91 lb 3.2 oz (41.4 kg)   SpO2 97%   BMI 16.68 kg/m²   Temp  Av.9 °F (36.6 °C)  Min: 97.5 °F (36.4 °C)  Max: 98.6 °F (37 °C)  Constitutional:  The patient is awake . thin  Skin:    Warm and dry. HEENT:    AT/NC  Chest:   No use of accessory muscles to breathe.  Symmetrical expansion. Dec bs bases  Cardiovascular:  S1 and S2 are rhythmic and regular. No murmurs appreciated. Abdomen:   Positive bowel sounds to auscultation. Benign to palpation. Extremities:   No clubbing, no cyanosis,  edema. ble dressed  CNS    AAxO   Lines: piv      DIAGNOSTIC RESULTS   Radiology:    Recent Labs     07/16/22  0711 07/17/22  0913 07/17/22  1933 07/18/22  0355 07/18/22  0743 07/18/22  1111   WBC 8.1 10.1  --   --  12.3*  --    RBC 2.72* 2.71*  --   --  2.75*  --    HGB 8.3* 8.1*   < > 7.7* 8.0* 8.7*   HCT 26.8* 26.8*  --   --  27.4*  --    MCV 98.5 98.9  --   --  99.6  --    MCH 30.5 29.9  --   --  29.1  --    MCHC 31.0* 30.2*  --   --  29.2*  --    RDW 15.5* 15.5*  --   --  15.3*  --     193  --   --  187  --    MPV 10.6 10.6  --   --  10.4  --     < > = values in this interval not displayed. Recent Labs     07/16/22  0711 07/17/22  0913 07/17/22  1534 07/18/22  0743    150* 148* 147*   K 4.6 4.1 3.9 3.6   * 116* 113* 111*   CO2 27 28 27 29   BUN 47* 54* 53* 47*   CREATININE 0.7 0.7 0.7 0.7   GLUCOSE 138* 132* 128* 107*   PROT 5.4* 5.4*  --  5.5*   LABALBU 2.8* 2.9*  --  3.0*   CALCIUM 8.8 9.1 9.7 9.1   BILITOT 0.8 0.6  --  0.8   ALKPHOS 57 59  --  60   AST 32* 28  --  30   ALT 18 13  --  16       No results found for: CRP  Lab Results   Component Value Date    SEDRATE 12 02/04/2013     Recent Labs     07/16/22  0711 07/17/22  0913 07/18/22  0743   INR  --   --  1.1   PROTIME  --   --  12.8*   AST 32* 28 30   ALT 18 13 16            Microbiology:      FINAL IMPRESSION    Pt had   Chief Complaint   Patient presents with    Fall    Admitted for Closed fracture of right hip, initial encounter ([facility] [S72.001A]  Hip fracture requiring operative repair, left, open type I or II, initial encounter ([facility] [S72.002B]  Fall on same level from slipping, tripping or stumbling, initial encounter [W01. 0XXA]  Traumatic rhabdomyolysis, initial encounter ([facility] Codey Aviles. 6XXA]  On treatment for Leukocytosis  s/p left hip fracture  Procedure(s): 7/15  RIGHT INTROCHANTERIC CEPHALOMEDULLARY NAIL  INSERTION  UTI      -bladder scan  has purwick        cefTRIAXone (ROCEPHIN) 1,000 mg in sterile water 10 mL IV syringe,  qday         Monitor labs    Imaging and labs were reviewed per medical records.         Electronically signed by Tate Franz MD on 7/18/2022 at 3:51 PM

## 2022-07-18 NOTE — PROCEDURES
Procedure:  Esophagogastroduodenoscopy with PEG tube placement    Indication: Inability to take p.o., malnutrition    Sedation  MAC, patient on Rocephin IV    Endoscope was advanced easily through mouth to second portion of duodenum      Oropharynx views are limited but grossly normal.    Esophagus:   Mucosa is normal.  GEJ at 40 cm. Stomach:   Antrum is normal    Gastric body is normal.    Retroflexed views show normal fundus and cardia. Duodenum: Bulb is normal.    Second portion of duodenum is normal.  A spot in the left upper quadrant was palpated with showing direct red light reflex. It was then prepped and draped in sterile fashion. Local anesthesia was given. Safety track was placed using the anesthesia needle. Incision performed. A 20 British soft dome PEG was placed using the pull-through technique. Patient tolerated the procedure well. No apparent postprocedure complications. Bumper at 2 cm on the anterior abdominal wall    EBL: 1 to 2 cc    IMPRESSION AND PLAN:     1. Normal upper endoscopy. 2.  20 British soft dome PEG placed with difficulty    Recommendations: Medications and flushes through PEG. Tube feeds to begin tomorrow after PEG tube assessed by GI. Follow up as outpatient in office, call [phone] to schedule for appointment.       KWADWO Yuen MD  7/18/2022  4:22 PM

## 2022-07-18 NOTE — PROGRESS NOTES
Department of Internal Medicine  General Internal Medicine  Attending Progress Note      SUBJECTIVE:    Patient seen and examined this morning  Awake , somewhat confused no distress  No complaints. Failed swallowing study and NPO for now. Medications    Current Facility-Administered Medications: sodium chloride flush 0.9 % injection 5-40 mL, 5-40 mL, IntraVENous, 2 times per day  sodium chloride flush 0.9 % injection 5-40 mL, 5-40 mL, IntraVENous, PRN  0.9 % sodium chloride infusion, 25 mL, IntraVENous, PRN  pantoprazole (PROTONIX) injection 40 mg, 40 mg, IntraVENous, Daily  dextrose 5 % solution, , IntraVENous, Continuous  metoprolol (LOPRESSOR) injection 5 mg, 5 mg, IntraVENous, Q6H  cefTRIAXone (ROCEPHIN) 1,000 mg in sterile water 10 mL IV syringe, 1,000 mg, IntraVENous, Q24H  aspirin chewable tablet 81 mg, 81 mg, Oral, BID WC  morphine (PF) injection 2 mg, 2 mg, IntraVENous, Q2H PRN **OR** morphine injection 4 mg, 4 mg, IntraVENous, Q2H PRN  oxyCODONE (ROXICODONE) immediate release tablet 5 mg, 5 mg, Oral, Q4H PRN **OR** oxyCODONE (ROXICODONE) immediate release tablet 10 mg, 10 mg, Oral, Q4H PRN    Physical    VITALS:  BP (!) 141/57   Pulse 69   Temp 98.1 °F (36.7 °C) (Infrared)   Resp 22   Ht 5' 2\" (1.575 m)   Wt 91 lb 3.2 oz (41.4 kg)   SpO2 97%   BMI 16.68 kg/m²   TEMPERATURE:  Current - Temp: 98.1 °F (36.7 °C);  Max - Temp  Av °F (36.7 °C)  Min: 97.5 °F (36.4 °C)  Max: 98.6 °F (37 °C)  RESPIRATIONS RANGE: Resp  Av.8  Min: 16  Max: 24  PULSE RANGE: Pulse  Av.5  Min: 69  Max: 99  BLOOD PRESSURE RANGE:  Systolic (65IPT), GNX:534 , Min:129 , WLS:149   ; Diastolic (03BPL), TCD:64, Min:57, Max:85    PULSE OXIMETRY RANGE: SpO2  Av.6 %  Min: 93 %  Max: 100 %  24HR INTAKE/OUTPUT:    Intake/Output Summary (Last 24 hours) at 2022 1802  Last data filed at 2022 1646  Gross per 24 hour   Intake 725 ml   Output 550 ml   Net 175 ml         CONSTITUTIONAL: Awake, no distress, appears as stated age. Frail. HEENT: Normocephalic atraumatic. No icterus, mild pallor  NECK: Supple, no JVD , no lymphadenopathy, no bruits, no thyromegaly  LUNGS: Clear to auscultation bilaterally. Diminished over bases  CARDIOVASCULAR: Regularly irregular, no murmur ,rub or gallop. ABDOMEN: Soft, nontender, bowel sounds are positive, no organomegaly appreciated. NEUROLOGIC: Awake, alert, oriented to place and partially in time, no focal deficits noted. EXT: No edema, clubbing, or cyanosis. CBC with Differential:    Lab Results   Component Value Date/Time    WBC 12.3 07/18/2022 07:43 AM    RBC 2.75 07/18/2022 07:43 AM    HGB 8.7 07/18/2022 11:11 AM    HCT 27.4 07/18/2022 07:43 AM     07/18/2022 07:43 AM    MCV 99.6 07/18/2022 07:43 AM    MCH 29.1 07/18/2022 07:43 AM    MCHC 29.2 07/18/2022 07:43 AM    RDW 15.3 07/18/2022 07:43 AM    SEGSPCT 73 02/04/2013 02:45 PM    LYMPHOPCT 8.5 07/18/2022 07:43 AM    MONOPCT 11.6 07/18/2022 07:43 AM    BASOPCT 0.1 07/18/2022 07:43 AM    MONOSABS 1.42 07/18/2022 07:43 AM    LYMPHSABS 1.04 07/18/2022 07:43 AM    EOSABS 0.00 07/18/2022 07:43 AM    BASOSABS 0.01 07/18/2022 07:43 AM     CMP:    Lab Results   Component Value Date/Time     07/18/2022 07:43 AM    K 3.6 07/18/2022 07:43 AM     07/18/2022 07:43 AM    CO2 29 07/18/2022 07:43 AM    BUN 47 07/18/2022 07:43 AM    CREATININE 0.7 07/18/2022 07:43 AM    GFRAA >60 07/18/2022 07:43 AM    LABGLOM >60 07/18/2022 07:43 AM    GLUCOSE 107 07/18/2022 07:43 AM    PROT 5.5 07/18/2022 07:43 AM    LABALBU 3.0 07/18/2022 07:43 AM    CALCIUM 9.1 07/18/2022 07:43 AM    BILITOT 0.8 07/18/2022 07:43 AM    ALKPHOS 60 07/18/2022 07:43 AM    AST 30 07/18/2022 07:43 AM    ALT 16 07/18/2022 07:43 AM       Assessment and plan    1. S/P Fall. 2.Rt intertrochanteric fracture of the neck of femur, Orthopedics following , S/P ORIF with intramedullary nail 7/15th  Pain controlled. PT/OT, awaiting rehab placement.   3.Sinus tachycardia with

## 2022-07-18 NOTE — PLAN OF CARE
Problem: Pain  Goal: Verbalizes/displays adequate comfort level or baseline comfort level  Outcome: Progressing Towards Goal     Problem: Safety - Adult  Goal: Free from fall injury  Outcome: Progressing Towards Goal

## 2022-07-19 NOTE — PROGRESS NOTES
OT BEDSIDE TREATMENT NOTE      Date:2022  Patient Name: Maggie Paz  MRN: [id number]  : 10/17/1928  Room: [address] [place] [address]         Evaluating OT: Gita Prieto OTR/L #[id number]      Referring Provider and Specific Provider Orders/Date:      22   OT eval and treat  Start:  22 0900,   End:  22 09,   ONE TIME,   Standing Count:  1 Occurrences,   R        Order went unreviewed at transfer on Sun 2022  1:59 PM    Paz Bah MD       Placement Recommendation: Subacute Rehab         Diagnosis:   1. Closed fracture of right hip, initial encounter ([facility]   2. Fall on same level from slipping, tripping or stumbling, initial encounter   3.  Traumatic rhabdomyolysis, initial encounter ([facility]   4. Closed fracture of left hip, initial encounter [facility])            Surgery: S/p RIGHT INTROCHANTERIC CEPHALOMEDULLARY NAIL  INSERTION 7/15/22 by Dr. Minal Suarez      Pertinent Medical History:       Past Medical History        Past Medical History:   Diagnosis Date    Arthritis      Osteoporosis      Paget's disease              Past Surgical History         Past Surgical History:   Procedure Laterality Date    COLON SURGERY         Diverticuli    JOINT REPLACEMENT         left 2010    KNEE SURGERY         Left x 2    LUNG BIOPSY        NERVE BLOCK   2011    NERVE BLOCK   11 16      right cervical paravertebral facet #2    NERVE BLOCK   11 30 2011    NERVE BLOCK   12 28 2012     cervical epidural #1    NERVE BLOCK         2013    NERVE BLOCK   1 21 13     cerv ep #3    NERVE BLOCK Right 06 12 2013     cervical paravertebral facet #1    NERVE BLOCK Right 6 24 13     cerv facet #2    NERVE BLOCK Right 07 24 2013     cervical paravertebral facet #3    NERVE BLOCK Right 1 15 14     cerv transforam #1    NERVE BLOCK Right 2014     right cervical transforaminal nerve block  #2    NERVE BLOCK   14     transforaminal nerve block right cervical #3    NERVE BLOCK Bilateral on energy conservation strategies, correct breathing pattern and techniques to improve independence/tolerance for self-care routine  * Functional transfer/mobility training/DME recommendations for increased independence, safety, and fall prevention  * Patient/Family education to increase follow through with safety techniques and functional independence  * Recommendation of environmental modifications for increased safety with functional transfers/mobility and ADLs  * Cognitive retraining/development of therapeutic activities to improve problem solving, judgement, memory, and attention for increased safety/participation in ADL/IADL tasks  * Therapeutic exercise to improve motor endurance, ROM, and functional strength for ADLs/functional transfers  * Therapeutic activities to facilitate/challenge dynamic balance, stand tolerance for increased safety and independence with ADLs  * Positioning to improve skin integrity, interaction with environment and functional independence     Recommended Adaptive Equipment: TBD       Home Living: Lives with daughter, 3rd floor apartment with flights of stairs and rails. Bathroom set-up: tub/shower, shower chair         Equipment owned: cane, wheeled walker      Prior Level of Function: Pt reportedly independent with ADLs and most IADLs but daughter assists as needed; ambulated independently with cane.       Driving: No      Pain Level: pt had no c/o pain at this time, although pt lethargice with cuing to open eyes during session          Cognition: A&O: 3/4; Follows 2 step directions; pt lethargic but became increasingly alert with activities               Memory: fair              Sequencing: fair               Problem solving: safia Olson minus              Judgement/safety: safia Olson minus      [facility]   AM-PAC Daily Activity Inpatient  How much help for putting on and taking off regular lower body clothing?: Total  How much help for Bathing?: A Lot  How much help for Toileting?: Total  How much help for putting on and taking off regular upper body clothing?: A Lot  How much help for taking care of personal grooming?: A Little  How much help for eating meals?: Total  AM-PAC Inpatient Daily Activity Raw Score: 10  AM-PAC Inpatient ADL T-Scale Score : 27.31  ADL Inpatient CMS 0-100% Score: 74.7  ADL Inpatient CMS G-Code Modifier : CL                Functional Assessment:    Initial Eval Status  Date: 7/18/22    Treatment Status  Date:7/19/22 STGs = LTGs  Time frame: 10-14 days   Feeding Dependent  NPO at time of eval; plans for PEG tube placement    Dep - feeding tube running on arrival and throughout and at end of session  N/A    Grooming Minimal Assist    Pt able to wash face with set up assist; assist to moisten [place] for pt to complete oral hygiene  Supervision    UB Dressing Moderate Assist    Mod A to change gowns and thread IV and PEG tube around gown Supervision    LB Dressing Dependent    Dep to change socks Moderate Assist    Bathing Maximal Assist    Mod A; pt able to wash UB when seated in bed with cuing for follow through ; pt has  binder donned on torso d/t PEG tube; assist for LE bathing Minimal Assist    Toileting Dependent   Puriwick catheter in place     Dep - purewick catheter in place  Moderate Assist    Bed Mobility Supine to sit: Maximal Assist x 1   Sit to supine: Moderate Assist x 2     N/T d/t lethargy initially and decreased alertness  Supine to sit: Minimal Assist   Sit to supine: Minimal Assist    Functional Transfers Sit to stand: Moderate Assist  Stand to sit: Moderate Assist      Transfer training with verbal cues for hand/feet placement throughout session to improve safety. N/T  Minimal Assist    Functional Mobility Minimal Assist x 2 with 2-3 small side steps along side of bed to improve balance, verbal cues for walker sequence and safety.     N/T  Minimal Assist with use of wheeled walker    Balance Sitting:    Static: fair    Dynamic: fair/fair minus  Standing: fair minus/poor     Sitting:    Static: fair    Dynamic: fN/T  Standing: N/T   Sitting:    Static: good    Dynamic: good  Standing: fair plus    Activity Tolerance fair   Fair/fair minus; pt able to count during UE ex's; cuing to keep eyes open during session; pt able to assist in washing UB  Increase standing tolerance >2-3 minutes for improved engagement with functional transfers and indep in ADLs      Visual/  Perceptual Glasses: Yes      Reports changes in vision since admission: N/A      NA      Hand Dominance:       AROM (PROM) Strength Additional Info: Goal:   RUE WFL 3+/5 Fair  and wfl FMC/dexterity noted during ADL tasks    Improve overall RUE strength  for participation in functional tasks   LUE [place] [place] 3+/5 Fair  and wfl FMC/dexterity noted during ADL tasks    Improve overall LUE strength  for participation in functional tasks    - BUE PROM exercises: 15 reps in all planes of movement to increase ROM/endurance/strength required for functional transfers/ADL participation. Exercises completed in shoulder and elbow flexion/extension, internal/external rotation, abduction/adduction, supination/pronation, digit and wrist flexion/extension and digit opposition. B UE ROM appears to be [email or web address] with assist; Min rest breaks provided 2* to decreased endurance/tolerance. Hearing:  WFL  Sensation: No c/o numbness or tingling  Tone:  WFL (weakness noted in B UE's; deformity noted in R wrist)  Edema: N/a      Comments: Patient cleared by nursing staff. Upon arrival pt seated in bed with HOB elevated greater than 30* 2* to tube feed running. Pt agreeable to OT tx session. Pt educated with regards to bed mobility, hand placement, safety awareness, ADL retraining,  grooming tasks, UE/LE bathing, LE/UE dressing,  BUE ROM ex's,  ECT's. At end of session pt seated in bed with HOB elevated greater than 30* to tube feed running; all lines and tubes intact, call light within reach.  Overall, pt

## 2022-07-19 NOTE — PROGRESS NOTES
Physical Therapy        [id number]    Patient unavailable for physical therapy treatment due to patient sleeping in bed and doesn't wake up, snoring. Patient opens eyes briefly and closes again. Will check back on patient at later time/date.      Contreras Morales, PTA  #[id number]

## 2022-07-19 NOTE — PLAN OF CARE
Problem: Pain  Goal: Verbalizes/displays adequate comfort level or baseline comfort level  7/19/2022 0208 by Wolf Mcbride RN  Outcome: Progressing Towards Goal     Problem: Safety - Adult  Goal: Free from fall injury  7/19/2022 0208 by Wolf Mcbride RN  Outcome: Progressing Towards Goal     Problem: ABCDS Injury Assessment  Goal: Absence of physical injury  7/19/2022 0208 by Wolf Mcbride RN  Outcome: Progressing Towards Goal     Problem: Skin/Tissue Integrity  Goal: Absence of new skin breakdown  Description: 1. Monitor for areas of redness and/or skin breakdown  2. Assess vascular access sites hourly  3. Every 4-6 hours minimum:  Change oxygen saturation probe site  4. Every 4-6 hours:  If on nasal continuous positive airway pressure, respiratory therapy assess nares and determine need for appliance change or resting period.   7/19/2022 0208 by Wolf Mcbride RN  Outcome: Progressing Towards Goal

## 2022-07-19 NOTE — PROGRESS NOTES
Cardiology  Progress Note      SUBJECTIVE: Chest sounded very congested this morning when I came to see her. Current Inpatient Medications  Current Facility-Administered Medications: sodium chloride flush 0.9 % injection 5-40 mL, 5-40 mL, IntraVENous, 2 times per day  sodium chloride flush 0.9 % injection 5-40 mL, 5-40 mL, IntraVENous, PRN  0.9 % sodium chloride infusion, 25 mL, IntraVENous, PRN  pantoprazole (PROTONIX) injection 40 mg, 40 mg, IntraVENous, Daily  dextrose 5 % solution, , IntraVENous, Continuous  metoprolol (LOPRESSOR) injection 5 mg, 5 mg, IntraVENous, Q6H  cefTRIAXone (ROCEPHIN) 1,000 mg in sterile water 10 mL IV syringe, 1,000 mg, IntraVENous, Q24H  aspirin chewable tablet 81 mg, 81 mg, Oral, BID WC  morphine (PF) injection 2 mg, 2 mg, IntraVENous, Q2H PRN **OR** morphine injection 4 mg, 4 mg, IntraVENous, Q2H PRN  oxyCODONE (ROXICODONE) immediate release tablet 5 mg, 5 mg, Oral, Q4H PRN **OR** oxyCODONE (ROXICODONE) immediate release tablet 10 mg, 10 mg, Oral, Q4H PRN      Physical  VITALS:  BP (!) 172/68   Pulse 78   Temp 98.1 °F (36.7 °C) (Axillary)   Resp 16   Ht 5' 2\" (1.575 m)   Wt 91 lb 3.2 oz (41.4 kg)   SpO2 92%   BMI 16.68 kg/m²   CURRENT TEMPERATURE:  Temp: 98.1 °F (36.7 °C)  CONSTITUTIONAL: No acute distress. EYES: Vision is intact. ENT: No sore throat. No ear drainage. NECK: No JVD. BACK: Symmetric. LUNGS:  rhonchi right base and left base, diminished breath sounds right base and left base  CARDIOVASCULAR:  normal S1 and S2 and murmurs include systolic murmur I/VI located at left sternal border without radiation  ABDOMEN:  non-distended  NEUROLOGIC: No focal deficits. EXTREMITIES: No edema cyanosis or clubbing.     DATA:        Cardiology Labs:  BMP:    Lab Results   Component Value Date/Time     07/19/2022 07:16 AM    K 3.4 07/19/2022 07:16 AM     07/19/2022 07:16 AM    CO2 29 07/19/2022 07:16 AM    BUN 30 07/19/2022 07:16 AM     CBC:    Lab Results

## 2022-07-19 NOTE — PROGRESS NOTES
[address] [facility]  [facility]  Progress Note    NAME: Thanh Schumacher  MR:  [id number]  :   10/17/1928  DATE OF SERVICE:22    This is a face to face encounter with Thanh Schumacher 80 y.o. female on 22  Elements of this note, including Diagnosis,  Interval History, Past Medical/Surgical/Family/Social Histories, ROS, physical exam, and Assessment and Plan were copied and pasted from Previous. Updates have been made where noted and reflect current exam and medical decision making from the DOS of this encounter. CHIEF COMPLAINT     ID following for   Chief Complaint   Patient presents with    Fall     HISTORY OF PRESENT ILLNESS     Pt seen and examined  22  In bed asleep briefly arousable   ON QUESTIONING PT DENIES DISCOMFORT  DAUGHTER PRESENT     Patient is tolerating medications. No reported adverse drug reactions. REVIEW OF SYSTEMS     As stated above in the chief complaint, otherwise negative. CURRENT MEDICATIONS      metoprolol tartrate  50 mg Oral BID    sodium chloride flush  5-40 mL IntraVENous 2 times per day    pantoprazole  40 mg IntraVENous Daily    cefTRIAXone (ROCEPHIN) IV  1,000 mg IntraVENous Q24H    aspirin  81 mg Oral BID WC     Continuous Infusions:   sodium chloride      dextrose 50 mL/hr at 22 0734     PRN Meds:sodium chloride flush, sodium chloride, morphine **OR** morphine, oxyCODONE **OR** oxyCODONE    PHYSICAL EXAM     BP (!) 134/56   Pulse 89   Temp 98.1 °F (36.7 °C) (Axillary)   Resp 16   Ht 5' 2\" (1.575 m)   Wt 91 lb 3.2 oz (41.4 kg)   SpO2 98%   BMI 16.68 kg/m²   Temp  Av.3 °F (36.8 °C)  Min: 98.1 °F (36.7 °C)  Max: 98.6 °F (37 °C)  Constitutional:  The patient is awake . thin  Skin:    Warm and dry. HEENT:    AT/NC  Chest:   No use of accessory muscles to breathe. Symmetrical expansion. Dec bs bases  Cardiovascular:  S1 and S2 are rhythmic and regular. No murmurs appreciated.    Abdomen:   Positive bowel sounds to auscultation. Benign to palpation. Extremities:     edema. ble dressed RIGH THIP DRESSED  CNS    AAxO   Lines: piv  [place] RIGHT LABIA/THIGH BRUISED      DIAGNOSTIC RESULTS   Radiology:    Recent Labs     07/17/22  0913 07/17/22  1933 07/18/22  0743 07/18/22  1111 07/18/22  1844 07/19/22  0329 07/19/22  0716   WBC 10.1  --  12.3*  --   --   --  9.8   RBC 2.71*  --  2.75*  --   --   --  2.97*   HGB 8.1*   < > 8.0*   < > 8.5* 8.4* 8.7*   HCT 26.8*  --  27.4*  --   --   --  29.0*   MCV 98.9  --  99.6  --   --   --  97.6   MCH 29.9  --  29.1  --   --   --  29.3   MCHC 30.2*  --  29.2*  --   --   --  30.0*   RDW 15.5*  --  15.3*  --   --   --  14.8     --  187  --   --   --  194   MPV 10.6  --  10.4  --   --   --  10.4    < > = values in this interval not displayed. Recent Labs     07/17/22 0913 07/17/22  1534 07/18/22  0743 07/19/22  0716   * 148* 147* 146   K 4.1 3.9 3.6 3.4*   * 113* 111* 107   CO2 28 27 29 29   BUN 54* 53* 47* 30*   CREATININE 0.7 0.7 0.7 0.5   GLUCOSE 132* 128* 107* 104*   PROT 5.4*  --  5.5* 5.4*   LABALBU 2.9*  --  3.0* 3.0*   CALCIUM 9.1 9.7 9.1 8.9   BILITOT 0.6  --  0.8 1.1   ALKPHOS 59  --  60 61   AST 28  --  30 26   ALT 13  --  16 17       No results found for: CRP  Lab Results   Component Value Date    SEDRATE 12 02/04/2013     Recent Labs     07/17/22  0913 07/18/22  0743 07/19/22  0716   INR  --  1.1  --    PROTIME  --  12.8*  --    AST 28 30 26   ALT 13 16 17            Microbiology:      FINAL IMPRESSION    Pt had   Chief Complaint   Patient presents with    Fall    Admitted for Closed fracture of right hip, initial encounter ([facility] [S72.001A]  Hip fracture requiring operative repair, left, open type I or II, initial encounter ([facility] [S72.002B]  Fall on same level from slipping, tripping or stumbling, initial encounter [W01. 0XXA]  Traumatic rhabdomyolysis, initial encounter ([facility] Lukas [place]. 6XXA]  On treatment for   Leukocytosis  s/p left hip fracture  Procedure(s): 7/15  RIGHT INTROCHANTERIC CEPHALOMEDULLARY NAIL  INSERTION  UTI      has purwick        cefTRIAXone (ROCEPHIN) 1,000 mg in sterile water 10 mL IV syringe,  qday  WILL STOP ATBX        Monitor labs    Imaging and labs were reviewed per medical records.         Electronically signed by Veronique House MD on 7/19/2022 at 2:31 PM

## 2022-07-19 NOTE — PROGRESS NOTES
Physical Therapy    Physical Therapy Treatment Note/Plan of Care    Room #:  2256/0439-00  Patient Name: Carlos A Altman  YOB: 1928  MRN: [id number]    Date of Service: 7/19/2022     Tentative placement recommendation: subacute rehab   Equipment recommendation: To be determined      Evaluating Physical Therapist: Jenifer Doll  #[id number]     Specific Provider Orders/Date/Referring Provider :  07/16/22 0900    PT eval and treat  Start:  07/16/22 0900,   End:  07/16/22 0900,   ONE TIME,   Standing Count:  1 Occurrences,   Speedy Latham MD     Admitting Diagnosis:   Closed fracture of right hip, initial encounter [facility]) [S72.001A]  Hip fracture requiring operative repair, left, open type I or II, initial encounter ([facility] [address] [S72.002B]  Fall on same level from slipping, tripping or stumbling, initial encounter [W01. 0XXA]  Traumatic rhabdomyolysis, initial encounter ([facility] [address] Rafael Eliana. 6XXA]   Visit diagnosis:   Visit Diagnoses         Codes    Closed fracture of right hip, initial encounter [facility])    -  Primary S72.001A    Fall on same level from slipping, tripping or stumbling, initial encounter     W01. 0XXA    Traumatic rhabdomyolysis, initial encounter ([facility] [address]     T79. 6XXA    Closed fracture of left hip, initial encounter [facility])     S72.002A        Admitted with  fall, unknown time on floor 7/13    Surgery:     right   INTROCHANTERIC CEPHALOMEDULLARY NAIL  INSERTION    Patient Active Problem List   Diagnosis    Degenerative arthropathy of spinal facet joint, Multilevel of the cervical spine    DDD (degenerative disc disease), cervicalC6-C7    Spondylosis, lumbosacral    Lumbar facet arthropathy    Lumbar spinal stenosis    Osteoarthrosis, hip    Knee pain    Facet syndrome right C2-C6     Degenerative osteoarthrosis right C2-C6    Protruded cervical disc    Cervical radiculopathy    Neural foraminal stenosis of lumbar spine    Neural foraminal stenosis of cervical spine    Osteoarthritis of lumbar spine, degenerative with facet syndrome    Hip fracture requiring operative repair, left, open type I or II, initial encounter ([facility] [address]    Severe protein-calorie malnutrition ([facility] [address]       ASSESSMENT of current deficits: Patient exhibits decreased strength, balance, endurance, range of motion, coordination, and pain    impairing functional mobility, transfers, gait , gait distance, and tolerance to activity are barriers to d/c and require skilled intervention during hospital stay to attain pre hospital level of function. Patient out of it today from medications. Requires max encouragement to participate with therapy. Unsafe for out of bed activity due to unable to keep eyes open. Patient would benefit from continued therapy for above deficits to decrease all risk. PHYSICAL THERAPY  PLAN OF CARE       Physical therapy plan of care is established based on physician order,  patient diagnosis and clinical assessment    Current Treatment Recommendations:    -Bed Mobility: Lower extremity exercises , Upper extremity exercises , and Trunk control activities   -Sitting Balance: Incorporate reaching activities to activate trunk muscles , Hands on support to maintain midline , and Facilitate postural control in all planes   -Standing Balance: Perform strengthening exercises in standing to promote motor control with or without upper extremity support  and Instruct patient on adequate base of support to maintain balance  -Transfers: Provide instruction on proper hand and foot position for adequate transfer of weight onto lower extremities and use of gait device if needed, Cues for hand placement, technique and safety.  Provide stabilization to prevent fall , and Facilitate weight shift forward on to lower extremities and provide necessary stabilization of bilateral lower extremities   -Gait: Gait training, Standing activities to improve: base of support, weight shift, weight bearing , and Exercises to improve hip and knee control   -Endurance: Utilize Supervised activities to increase level of endurance to allow for safe functional mobility including transfers and gait     PT long term treatment goals are located in below grid    Patient and or family understand(s) diagnosis, prognosis, and plan of care. Frequency of treatments: Patient will be seen  twice daily  for therapeutic exercise, functional retraining, endurance activities, balance exercises, family and patient education.        Prior Level of Function: Patient ambulated independently and climbed steps  Rehab Potential: good   for baseline    Past medical history:   Past Medical History:   Diagnosis Date    Arthritis     Osteoporosis     Paget's disease      Past Surgical History:   Procedure Laterality Date    COLON SURGERY      Diverticuli    FEMUR FRACTURE SURGERY Left 7/15/2022    RIGHT INTROCHANTERIC CEPHALOMEDULLARY NAIL  INSERTION performed by Maddison Greene DO at [address] [facility]      left 2010    KNEE SURGERY      Left x 2    LUNG BIOPSY      NERVE BLOCK  11-    NERVE BLOCK  11 16 2011    right cervical paravertebral facet #2    NERVE BLOCK  11 30 2011    NERVE BLOCK  12 28 2012    cervical epidural #1    NERVE BLOCK      1-9-2013    NERVE BLOCK  1 21 13    cerv ep #3    NERVE BLOCK Right 06 12 2013    cervical paravertebral facet #1    NERVE BLOCK Right 6 24 13    cerv facet #2    NERVE BLOCK Right 07 24 2013    cervical paravertebral facet #3    NERVE BLOCK Right 1 15 14    cerv transforam #1    NERVE BLOCK Right 2/19/2014    right cervical transforaminal nerve block  #2    NERVE BLOCK  03/05/14    transforaminal nerve block right cervical #3    NERVE BLOCK Bilateral 10/29/2014    himanshu lumbar paravertebral facet  #1    NERVE BLOCK Bilateral 11/5/2014    bilateral paravertebral facet  lumbar  #2    NERVE BLOCK Bilateral 11/12/14    paravertebral facet lumbar #3    NERVE BLOCK Right 05/22/2017    transforaminal cervical #1    NERVE BLOCK Right PT treatment. Patient's daughter present during session. OBJECTIVE:   Initial Evaluation  Date: 7/17/2022 Treatment Date:  7/19/2022   Short Term/ Long Term   Goals   Was pt agreeable to Eval/treatment? Yes yes    Pain Level  Unrated however with movement of  Right hip grimace and vocalized pain No number assigned to pain     Bed Mobility  Rolling: Maximal assist of 1    Supine to sit: Maximal assist of 1    Sit to supine: Maximal assist of 1    Scooting: Maximal assist of 1   Rolling: Not assessed    Supine to sit: Not assessed    Sit to supine: Not assessed    Scooting: Not assessed     Rolling: Independent    Supine to sit:  Independent    Sit to supine: Independent    Scooting: Independent     Transfers Sit to stand: Maximal assist of 1 anterior approach x 2 reps; attempted with walker however patient fearful; patient able to bear wt bilateral however 2 person assist for wheeled walker trf will be necessary next visit Sit to stand: Not assessed       Sit to stand: Modified Independent     Ambulation    not assessed  Not assessed   100 feet using  wheeled walker with Modified Independent    ROM Within functional limits except Right hip 50%, pt has significant internal rotation with inability to tolerate > neutral    Increase range of motion 10% of affected joints    Strength Within functional limits  except  Right lower extremity 2-/5    Within functional limits   Balance Sitting EOB:  poor   Dynamic Standing:  poor    Sitting EOB: not assessed   Dynamic Standing: not assessed     Sitting EOB:  good    Dynamic Standing:  good wheeled walker      Patient is Alert & Oriented x person, place, time, and situation and follows one step directions  hard of hearing   Sensation:  Patient denies numbness/tingling  Edema:  yes right lower extremity   Vitals: 2 Liters of o2 via nasal cannula   Blood Pressure at rest   Blood Pressure during session     Heart Rate at rest   Heart Rate during session     SPO2 at rest % SPO2 during session %     Patient education   Patient educated on role of Physical Therapy, risks of immobility, safety and plan of care,  importance of mobility while in hospital , ankle pumps, quad set and glut set for edema control, blood clot prevention, safety , and positioning for skin integrity and comfort      Patient response to education:   Pt verbalized understanding Pt demonstrated skill Pt requires further education in this area   Yes Partial Yes       Treatment:  Patient practiced and was instructed in the following treatment:     Therapist educated and facilitated patient on techniques to increase safety and independence with bed mobility, balance, functional transfers, and functional mobility. Patient assisted with supine exercises in bed. Patient positioned for comfort with pillow under R LE and blanket rolled up to avoid IR. Foam heel floaters donned. At end of session, patient in bed with alarm and daughter present call light and phone within reach,   all lines and tubes intact, nursing notified. Patient would benefit from continued skilled Physical Therapy to improve functional independence and quality of life. Patient's/ family goals   rehab      Patient and or family understand(s) diagnosis, prognosis, and plan of care. Frequency of treatments: Patient will be seen  twice daily  for therapeutic exercise, functional retraining, endurance activities, balance exercises, family and patient education.      Time in 119  Time out 135    Total Treatment Time  16 minutes    CPT codes:  Therapeutic exercises (53271)   16 minutes  1 unit(s)  Adriana Basilio, PTA  #[id number]

## 2022-07-20 NOTE — PROGRESS NOTES
Department of Internal Medicine  General Internal Medicine  Attending Progress Note      SUBJECTIVE:    Patient seen and examined this morning  Awake , somewhat confused no distress  No complaints. PEG tube in place and tolerating tube feeds    Medications    Current Facility-Administered Medications: metoprolol tartrate (LOPRESSOR) tablet 50 mg, 50 mg, Oral, BID  sodium chloride flush 0.9 % injection 5-40 mL, 5-40 mL, IntraVENous, 2 times per day  sodium chloride flush 0.9 % injection 5-40 mL, 5-40 mL, IntraVENous, PRN  0.9 % sodium chloride infusion, 25 mL, IntraVENous, PRN  pantoprazole (PROTONIX) injection 40 mg, 40 mg, IntraVENous, Daily  cefTRIAXone (ROCEPHIN) 1,000 mg in sterile water 10 mL IV syringe, 1,000 mg, IntraVENous, Q24H  aspirin chewable tablet 81 mg, 81 mg, Oral, BID WC  morphine (PF) injection 2 mg, 2 mg, IntraVENous, Q2H PRN **OR** morphine injection 4 mg, 4 mg, IntraVENous, Q2H PRN  oxyCODONE (ROXICODONE) immediate release tablet 5 mg, 5 mg, Oral, Q4H PRN **OR** oxyCODONE (ROXICODONE) immediate release tablet 10 mg, 10 mg, Oral, Q4H PRN    Physical    VITALS:  /69   Pulse 84   Temp 97.7 °F (36.5 °C) (Oral)   Resp 18   Ht 5' 2\" (1.575 m)   Wt 91 lb 3.2 oz (41.4 kg)   SpO2 97%   BMI 16.68 kg/m²   TEMPERATURE:  Current - Temp: 97.7 °F (36.5 °C); Max - Temp  Av.9 °F (36.6 °C)  Min: 97.7 °F (36.5 °C)  Max: 98.1 °F (36.7 °C)  RESPIRATIONS RANGE: Resp  Av.7  Min: 16  Max: 18  PULSE RANGE: Pulse  Av  Min: 84  Max: 89  BLOOD PRESSURE RANGE:  Systolic (65WZP), AMF:094 , Min:112 , EMA:389   ; Diastolic (55LPU), FTF:61, Min:56, Max:69    PULSE OXIMETRY RANGE: SpO2  Av.3 %  Min: 97 %  Max: 98 %  24HR INTAKE/OUTPUT:    Intake/Output Summary (Last 24 hours) at 2022 1318  Last data filed at 2022 1030  Gross per 24 hour   Intake 1947.74 ml   Output 350 ml   Net 1597.74 ml         CONSTITUTIONAL: Awake, no distress, appears as stated age. Frail.   HEENT: Normocephalic Controlled rate on Lopressor 50 mg bid per PEG  4. Dysphagia and possible aspiration, NPO for now, s/p PEG tube placement and tolerating tube feeds  Tolerating tube feeds  5. Paget's disease of bone. 6.Generalized osteoarthritis. 7.Leukocytosis, urine with pyuria and possible aspiration pneumonia, resolved. ID following and iv Rocephin per ID . Blood and urine cultures negative  8. Hypernatremia, resolved, dc iv fluids  9. Anemia, post op Hgb at 9.5 .  10. Senile Dementia most likely underlying her confusion.   D/W Daughter Dorothy Subramanian over the phone 7/19th  Plan is discharge to rehab later today , awaiting precert    Hamlet Gan MD, MD.  1:18 PM  7/20/2022

## 2022-07-20 NOTE — PLAN OF CARE
Problem: Pain  Goal: Verbalizes/displays adequate comfort level or baseline comfort level  Outcome: Progressing     Problem: Safety - Adult  Goal: Free from fall injury  7/20/2022 0346 by Espinoza Dodson RN  Outcome: Progressing  7/19/2022 2042 by Abdi Oviedo RN  Outcome: Progressing     Problem: ABCDS Injury Assessment  Goal: Absence of physical injury  7/20/2022 0346 by Espinoza Dodson RN  Outcome: Progressing  7/19/2022 2042 by Abdi Oviedo RN  Outcome: Progressing

## 2022-07-20 NOTE — PROGRESS NOTES
Physical Therapy    Physical Therapy Treatment Note/Plan of Care    Room #:  6961/6376-52  Patient Name: Priyanka Fung  YOB: 1928  MRN: [id number]    Date of Service: 7/20/2022     Tentative placement recommendation: subacute rehab   Equipment recommendation: To be determined      Evaluating Physical Therapist: Jenifer Mancera  #[id number]     Specific Provider Orders/Date/Referring Provider :  07/16/22 0900    PT eval and treat  Start:  07/16/22 0900,   End:  07/16/22 0900,   ONE TIME,   Standing Count:  1 Occurrences,   Faisal Carson MD     Admitting Diagnosis:   Closed fracture of right hip, initial encounter [facility]) [S72.001A]  Hip fracture requiring operative repair, left, open type I or II, initial encounter ([facility] [address] [S72.002B]  Fall on same level from slipping, tripping or stumbling, initial encounter [W01. 0XXA]  Traumatic rhabdomyolysis, initial encounter ([facility] [address] [place] 6XXA]   Visit diagnosis:   Visit Diagnoses         Codes    Closed fracture of right hip, initial encounter [facility])    -  Primary S72.001A    Fall on same level from slipping, tripping or stumbling, initial encounter     W01. 0XXA    Traumatic rhabdomyolysis, initial encounter ([facility] [address]     T79. 6XXA    Closed fracture of left hip, initial encounter [facility])     S72.002A        Admitted with  fall, unknown time on floor 7/13    Surgery:     right   INTROCHANTERIC CEPHALOMEDULLARY NAIL  INSERTION    Patient Active Problem List   Diagnosis    Degenerative arthropathy of spinal facet joint, Multilevel of the cervical spine    DDD (degenerative disc disease), cervicalC6-C7    Spondylosis, lumbosacral    Lumbar facet arthropathy    Lumbar spinal stenosis    Osteoarthrosis, hip    Knee pain    Facet syndrome right C2-C6     Degenerative osteoarthrosis right C2-C6    Protruded cervical disc    Cervical radiculopathy    Neural foraminal stenosis of lumbar spine    Neural foraminal stenosis of cervical spine    Osteoarthritis of lumbar spine, degenerative with facet syndrome    Hip fracture requiring operative repair, left, open type I or II, initial encounter ([facility] [address]    Severe protein-calorie malnutrition ([facility] [address]       ASSESSMENT of current deficits: Patient exhibits decreased strength, balance, endurance, range of motion, coordination, and pain    impairing functional mobility, transfers, gait , gait distance, and tolerance to activity are barriers to d/c and require skilled intervention during hospital stay to attain pre hospital level of function. Patient slightly improved with alertness today, however experiencing 10/10 pain in R hip with movement, patient was medicated by nursing for pain prior to session. Patient requires assist for supine exercises and Max assist to edge of bed and back to bed. Macario only tolerates sitting upright x1min with hard left lateral lean to off weight R hip and requires Max assist for support on edge of bed. Patient would benefit from continued therapy for above deficits to decrease all risk. PHYSICAL THERAPY  PLAN OF CARE       Physical therapy plan of care is established based on physician order,  patient diagnosis and clinical assessment    Current Treatment Recommendations:    -Bed Mobility: Lower extremity exercises , Upper extremity exercises , and Trunk control activities   -Sitting Balance: Incorporate reaching activities to activate trunk muscles , Hands on support to maintain midline , and Facilitate postural control in all planes   -Standing Balance: Perform strengthening exercises in standing to promote motor control with or without upper extremity support  and Instruct patient on adequate base of support to maintain balance  -Transfers: Provide instruction on proper hand and foot position for adequate transfer of weight onto lower extremities and use of gait device if needed, Cues for hand placement, technique and safety.  Provide stabilization to prevent fall , and Facilitate weight shift forward on to lower extremities and provide necessary stabilization of bilateral lower extremities   -Gait: Gait training, Standing activities to improve: base of support, weight shift, weight bearing , and Exercises to improve hip and knee control   -Endurance: Utilize Supervised activities to increase level of endurance to allow for safe functional mobility including transfers and gait     PT long term treatment goals are located in below grid    Patient and or family understand(s) diagnosis, prognosis, and plan of care. Frequency of treatments: Patient will be seen  twice daily  for therapeutic exercise, functional retraining, endurance activities, balance exercises, family and patient education.        Prior Level of Function: Patient ambulated independently and climbed steps  Rehab Potential: good   for baseline    Past medical history:   Past Medical History:   Diagnosis Date    Arthritis     Osteoporosis     Paget's disease      Past Surgical History:   Procedure Laterality Date    COLON SURGERY      Diverticuli    FEMUR FRACTURE SURGERY Left 7/15/2022    RIGHT INTROCHANTERIC CEPHALOMEDULLARY NAIL  INSERTION performed by Shaan Zimmer DO at [address] [facility]      left 2010    KNEE SURGERY      Left x 2    LUNG BIOPSY      NERVE BLOCK  11-    NERVE BLOCK  11 16 2011    right cervical paravertebral facet #2    NERVE BLOCK  11 30 2011    NERVE BLOCK  12 28 2012    cervical epidural #1    NERVE BLOCK      1-9-2013    NERVE BLOCK  1 21 13    cerv ep #3    NERVE BLOCK Right 06 12 2013    cervical paravertebral facet #1    NERVE BLOCK Right 6 24 13    cerv facet #2    NERVE BLOCK Right 07 24 2013    cervical paravertebral facet #3    NERVE BLOCK Right 1 15 14    cerv transforam #1    NERVE BLOCK Right 2/19/2014    right cervical transforaminal nerve block  #2    NERVE BLOCK  03/05/14    transforaminal nerve block right cervical #3    NERVE BLOCK Bilateral 10/29/2014    himanshu lumbar paravertebral facet #1    NERVE BLOCK Bilateral 11/5/2014    bilateral paravertebral facet  lumbar  #2    NERVE BLOCK Bilateral 11/12/14    paravertebral facet lumbar #3    NERVE BLOCK Right 05/22/2017    transforaminal cervical #1    NERVE BLOCK Right 06/05/2017    transforaminal #2    NERVE BLOCK Bilateral 06/12/2017    lumbar paravertebral facet #1    NERVE BLOCK Bilateral 06/21/2017    Bilatera lumbar paravertebral facet block 32 l3-4 l4-5 l5-s1    NERVE BLOCK Bilateral 09/09/2020    lumbar facet block    NERVE BLOCK Bilateral 9/9/2020    BILATERAL LUMBAR FACET BLOCKS AT L3-4,L4-5,L5-S1 x2 UNDER X-RAY #1 performed by Jennifer Barlow DO at [address] Bilateral 09/16/2020    lumbar facet paravertebral    NERVE BLOCK Bilateral 9/16/2020    BILATERAL LUMBAR FACET BLOCKS AT L3-4,L4-5,L5-S1 x2 UNDER X-RAY #2 performed by Jennifer Barlow DO at [address] Right 09 20 2013    radiofrequency neurolysis medial branch nerve cervical    TOTAL HIP ARTHROPLASTY      Left    UPPER GASTROINTESTINAL ENDOSCOPY N/A 7/18/2022    EGD ESOPHAGOGASTRODUODENOSCOPY PEG TUBE INSERTION performed by Tashi Ruff MD at [facility]:    Precautions:  Continuous Pulse Oximetry , falls, alarm, and O2 ,right lower extremity FWB (full weight bearing)     Social history: Patient lives with daughter in a  3 story home in [facility] on 3rd level   with  multiple steps   to enter with [address]: [employer],       [address] [facility]   How much difficulty turning over in bed?: A Lot  How much difficulty sitting down on / standing up from a chair with arms?: Unable  How much difficulty moving from lying on back to sitting on side of bed?: A Lot  How much help from another person moving to and from a bed to a chair?: Total  How much help from another person needed to walk in hospital room?: Total  How much help from another person for climbing 3-5 steps with a railing?: Total  AM-PAC Inpatient Mobility Raw Score : 8  AM-PAC Inpatient T-Scale Score : 28.52  Mobility Inpatient CMS 0-100% Score: 86.62  Mobility Inpatient CMS G-Code Modifier : CM    Nursing cleared patient for PT treatment. Nursing reports patient was recently medicated for pain. OBJECTIVE:   Initial Evaluation  Date: 7/17/2022 Treatment Date:  7/20/2022   Short Term/ Long Term   Goals   Was pt agreeable to Eval/treatment? Yes yes    Pain Level  Unrated however with movement of  Right hip grimace and vocalized pain No number assigned to pain     Bed Mobility  Rolling: Maximal assist of 1    Supine to sit: Maximal assist of 1    Sit to supine: Maximal assist of 1    Scooting: Maximal assist of 1   Rolling: Maximal assist of 1   Supine to sit: Maximal assist of 1 use of TAPS  Sit to supine: Maximal assist of 1   Scooting: Maximal assist of 1  Dependent x2 to head of bed    Rolling: Independent    Supine to sit: Independent    Sit to supine: Independent    Scooting: Independent     Transfers Sit to stand: Maximal assist of 1 anterior approach x 2 reps; attempted with walker however patient fearful; patient able to bear wt bilateral however 2 person assist for wheeled walker trf will be necessary next visit Sit to stand: Not assessed       Sit to stand: Modified Independent     Ambulation    not assessed  Not assessed   100 feet using  wheeled walker with Modified Independent    ROM Within functional limits except Right hip 50%, pt has significant internal rotation with inability to tolerate > neutral    Increase range of motion 10% of affected joints    Strength Within functional limits  except  Right lower extremity 2-/5    Within functional limits   Balance Sitting EOB:  poor   Dynamic Standing:  poor    Sitting EOB: poor hard left lateral lean, requires Max assist for upright.    Dynamic Standing: not assessed     Sitting EOB:  good    Dynamic Standing:  good wheeled walker      Patient is Alert & Oriented x person, place, time, and situation and follows one step directions  hard of hearing   Sensation:  Patient denies numbness/tingling  Edema:  yes right lower extremity   Vitals: 2 Liters of o2 via nasal cannula   Blood Pressure at rest   Blood Pressure during session     Heart Rate at rest   Heart Rate during session     SPO2 at rest %  SPO2 during session %     Patient education   Patient educated on role of Physical Therapy, risks of immobility, safety and plan of care,  importance of mobility while in hospital , ankle pumps, quad set and glut set for edema control, blood clot prevention, safety , and positioning for skin integrity and comfort      Patient response to education:   Pt verbalized understanding Pt demonstrated skill Pt requires further education in this area   Yes Partial Yes       Treatment:  Patient practiced and was instructed in the following treatment:     Therapist educated and facilitated patient on techniques to increase safety and independence with bed mobility, balance, functional transfers, and functional mobility. Patient assisted with supine exercises in bed. Patient assisted to edge of bed and sat x1min before requesting back to bed. Patient assisted to supine position and assist x2 to head of bed. Patient positioned for comfort with pillow under R LE and blanket rolled up to avoid IR. Foam heel floaters donned and wedge replaced under R side. At end of session, patient in bed with alarm call light and phone within reach,   all lines and tubes intact, nursing notified. Patient would benefit from continued skilled Physical Therapy to improve functional independence and quality of life. Patient's/ family goals   rehab      Patient and or family understand(s) diagnosis, prognosis, and plan of care.        Frequency of treatments: Patient will be seen  twice daily  for therapeutic exercise, functional retraining, endurance activities, balance exercises, family and patient education.      Time in 1123  Time out 1142    Total Treatment Time  19 minutes    CPT codes:  Therapeutic activities (48698)   19 minutes  1 unit(s)  Kevan Hahn, DANNY  #[id number]

## 2022-07-20 NOTE — PROGRESS NOTES
Speech Language Pathology  SPEECH LANGUAGE PATHOLOGY  DAILY PROGRESS NOTE        PATIENT NAME:  Tessie Sharma      :  10/17/1928          TODAY'S DATE:  2022 ROOM:  [address] [place] [address]    Pt seen in f/u for dysphagia management, resting in bed with TF infusing via PEG. Pt c/o 10/10 hip pain, RN aware and was going to medicate appropriately. Xerostomia noted, oral care completed accordingly. Pt with poor oral-motor manipulation of swab and water content, allowing the water to pool sublingually and requiring mod verbal and tactile cues to initiate a-p bolus propulsion for a swallow. Pt able to follow commands for basic lingual protrusion and lateralization exercises but could not cognitively follow commands for more complex swallowing exercises for tongue base retraction and laryngeal elevation. Pt's family present for part of session and therefore a review of session was provided and all questions were answered to satisfaction per verbal response. Will cont as per POC.      Kalpesh Cedeno, SLP  SP [id number]    CPT code(s) 87947  dysphagia tx  Total minutes :  18 minutes

## 2022-07-20 NOTE — PROGRESS NOTES
Physician Progress Note      PATIENT:               Cortes To  CSN #:                  [id number]  :                       10/17/1928  ADMIT DATE:       2022 6:59 PM  [address] [place] DATE:  RESPONDING  PROVIDER #:        Rita Garcia MD          QUERY TEXT:    Patient admitted with Right IT hip fracture and noted per cardio consult noted   She had slight troponin elevation around 25 and that can be demand ischemia   from the pain of her fracture with type 2 non-STEMI. If possible, please   document in the progress notes and discharge summary if you are evaluating   and/or treating any of the following:[[Non-ischemic myocardial injury due to   other[de-identified] The non-ischemic myocardial injury due to other condition##Please   specify cause.]      The medical record reflects the following:  Risk Factors: [place]. Elderly. Clinical Indicators: EKG not showing any ischemic changes, Trop 25, no c/o   chest pain  Treatment: Cardio clearance for surgery Metoprolol p.o. - switched to IV,   monitoring    Thank you,  Chelsea Reyna RN BSN CCDS  Options provided:  -- NSTEMI  -- Type 2 MI  -- Demand Ischemia with MI  -- Demand Ischemia only, no MI  -- Other - I will add my own diagnosis  -- Disagree - Not applicable / Not valid  -- Disagree - Clinically unable to determine / Unknown  -- Refer to Clinical Documentation Reviewer    PROVIDER RESPONSE TEXT:    Provider disagreed with this query. no evidence    Query created by: [employer] on 2022 12:49 PM      QUERY TEXT:    Pt admitted with fall - Rt. hip fracture and  has malnutrition documented. Per dietcian evaluation on  pt. meets criteria for severe protein calorie   malnutrition. Please further specify type of malnutrition with documentation   in the medical record.     The medical record reflects the following:  Risk Factors: Elderly, Dysphagia failed MBSS  Clinical Indicators:  Per dietician assessment \"Severe malnutrition, In   context of chronic illness related to cognitive or neurological impairment as   evidenced by Criteria as identified in malnutrition assessment, severe loss of   subcutaneous fat, severe muscle loss, poor intake prior to admission, intake   0-25%,\"    BMI 16.78 wt 91#  Treatment: Peg placed 7/18, TF dietician evaluation, Speech therapy    Thank you,  Joseph Clark RN BSN CCDS    ASPEN Criteria:    https://aspenjournals. onlinelibrary. lenz. com/doi/full/10.1177/530752248097967  5  Options provided:  -- Severe Protein calorie malnutrition  -- Other - I will add my own diagnosis  -- Disagree - Not applicable / Not valid  -- Disagree - Clinically unable to determine / Unknown  -- Refer to Clinical Documentation Reviewer    PROVIDER RESPONSE TEXT:    This patient has severe protein calorie malnutrition.     Query created by: Pennie Sapp on 7/19/2022 1:15 PM      Electronically signed by:  Nikki Otero MD 7/20/2022 8:34 AM

## 2022-07-20 NOTE — DISCHARGE INSTR - COC
Continuity of Care Form    Patient Name: Dawn Ramos   :  10/17/1928  MRN:  [id number]    Admit date:  2022  Discharge date:  ***    Code Status Order: Full Code   Advance Directives:   Advance Care Flowsheet Documentation       Date/Time Healthcare Directive Type of Healthcare Directive Copy in [address] Agent's Name Healthcare Agent's Phone Number    22 1400 No, patient does not have an advance directive for healthcare treatment -- -- -- -- --            Admitting Physician:  Rose Ahuja MD  PCP: Rose Ahuja MD    Discharging Nurse: [facility] Unit/Room#: 1633/3523-81  Discharging Unit Phone Number: ***    Emergency Contact:   Extended Emergency Contact Information  Primary Emergency Contact: [facility]  Address: [address] [place] [address] Phone: [phone]  Mobile Phone: [phone]  Relation: Brother/Sister   needed? No  Secondary Emergency Contact: FarhanmaxxОльга  [place] Phone: [phone]  Mobile Phone: [phone]  Relation: Brother/Sister   needed?  No    Past Surgical History:  Past Surgical History:   Procedure Laterality Date    COLON SURGERY      Diverticuli    FEMUR FRACTURE SURGERY Left 7/15/2022    RIGHT INTROCHANTERIC CEPHALOMEDULLARY NAIL  INSERTION performed by Kevin Billingsley DO at [facility] [address] [facility]      left 2010    KNEE SURGERY      Left x 2    LUNG BIOPSY      NERVE BLOCK  2011    NERVE BLOCK  11 16     right cervical paravertebral facet #2    NERVE BLOCK  11 30     NERVE BLOCK  12 28 2012    cervical epidural #1    NERVE BLOCK      --    NERVE BLOCK  1 21 13    cerv ep #3    NERVE BLOCK Right 06 12 2013    cervical paravertebral facet #1    NERVE BLOCK Right 6 24 13    cerv facet #2    NERVE BLOCK Right 07 24 2013    cervical paravertebral facet #3    NERVE BLOCK Right 1 15 14    cerv transforam #1    NERVE BLOCK Right 2/19/2014    right cervical transforaminal nerve block  #2    NERVE BLOCK  03/05/14    transforaminal nerve block right cervical #3    NERVE BLOCK Bilateral 10/29/2014    himanshu lumbar paravertebral facet  #1    NERVE BLOCK Bilateral 11/5/2014    bilateral paravertebral facet  lumbar  #2    NERVE BLOCK Bilateral 11/12/14    paravertebral facet lumbar #3    NERVE BLOCK Right 05/22/2017    transforaminal cervical #1    NERVE BLOCK Right 06/05/2017    transforaminal #2    NERVE BLOCK Bilateral 06/12/2017    lumbar paravertebral facet #1    NERVE BLOCK Bilateral 06/21/2017    Bilatera lumbar paravertebral facet block 32 l3-4 l4-5 l5-s1    NERVE BLOCK Bilateral 09/09/2020    lumbar facet block    NERVE BLOCK Bilateral 9/9/2020    BILATERAL LUMBAR FACET BLOCKS AT L3-4,L4-5,L5-S1 x2 UNDER X-RAY #1 performed by John Camargo DO at [facility] Bilateral 09/16/2020    lumbar facet paravertebral    NERVE BLOCK Bilateral 9/16/2020    BILATERAL LUMBAR FACET BLOCKS AT L3-4,L4-5,L5-S1 x2 UNDER X-RAY #2 performed by John Camargo DO at [address] [place] [address] Right 09 20 2013    radiofrequency neurolysis medial branch nerve cervical    TOTAL HIP ARTHROPLASTY      Left    UPPER GASTROINTESTINAL ENDOSCOPY N/A 7/18/2022    EGD ESOPHAGOGASTRODUODENOSCOPY PEG TUBE INSERTION performed by Rosibel Keating MD at [facility] ENDOSCOPY       Immunization History:   Immunization History   Administered Date(s) Administered    COVID-19, PFIZER PURPLE top, DILUTE for use, (age 15 y+), 30mcg/0.3mL 01/20/2021, 02/10/2021, 11/17/2021       Active Problems:  Patient Active Problem List   Diagnosis Code    Degenerative arthropathy of spinal facet joint, Multilevel of the cervical spine M47.819    DDD (degenerative disc disease), cervicalC6-C7 M50.30    Spondylosis, lumbosacral M47.817    Lumbar facet arthropathy M47.816    Lumbar spinal stenosis M48.061    Osteoarthrosis, hip M16.9    Knee pain M25.569 Facet syndrome right C2-C6 M47.899     Degenerative osteoarthrosis right C2-C6 M19.90    Protruded cervical disc M50.20    Cervical radiculopathy M54.12    Neural foraminal stenosis of lumbar spine M48.061    Neural foraminal stenosis of cervical spine M48.02    Osteoarthritis of lumbar spine, 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    Hip fracture requiring operative repair, left, open type I or II, initial encounter ([facility] S72.002B    Severe protein[facility]calorie malnutrition ([facility] E43       Isolation/Infection:   Isolation            No Isolation          Patient Infection Status       Infection Onset Added Last Indicated Last Indicated By Review Planned Expiration Resolved Resolved By    None active    Resolved    COVID-19 (Rule Out) 09/11/20 09/11/20 09/11/20 COVID-19 Ambulatory (Ordered)   09/16/20 Rule-Out Test Resulted    COVID-19 (Rule Out) 09/02/20 09/02/20 09/02/20 COVID-19 Ambulatory (Ordered)   09/04/20 Rule-Out Test Resulted            Nurse Assessment:  Last Vital Signs: /65   Pulse 85   Temp 98 °F (36.7 °C) (Infrared)   Resp 16   Ht 5' 2\" (1.575 m)   Wt 91 lb 3.2 oz (41.4 kg)   SpO2 97%   BMI 16.68 kg/m²     Last documented pain score (0-10 scale): Pain Level: 0  Last Weight:   Wt Readings from Last 1 Encounters:   07/13/22 91 lb 3.2 oz (41.4 kg)     Mental Status:  {IP PT MENTAL STATUS:10578}    IV Access:  { PATRICIA IV ACCESS:901692338}    Nursing Mobility/ADLs:  Walking   {[facility] DME OGBT:176481748}  Transfer  {[facility] DME TGHN:046335504}  Bathing  {[facility] WWSK:380140012}  Dressing  {[facility] QHTZ:636498458}  Toileting  {[facility] DME ROUJ:643458964}  Feeding  {[facility] JIHU:208480779}  Med Admin  {[facility] JKJA:946836525}  Med Delivery   { PATRICIA MED Delivery:032997720}    Wound Care Documentation and Therapy:  Wound 07/14/22 Pretibial Left (Active)   Wound Etiology Skin Tear 07/18/22 211   Dressing Status Clean;Dry; Intact 07/20/22 0346   Wound Cleansed Irrigated with saline 07/14/22 0135 22 0900   Closure Other (Comment) 22 0346   Margins Other (Comment) 22 0346   Incision Assessment Other (Comment) 22 0346   Drainage Amount None 22 0346   Odor None 22 0346   Number of days: 4        Elimination:  Continence: Bowel: {YES / DF:60835}  Bladder: {YES / IZ:75788}  Urinary Catheter: {Urinary Catheter:264082979}   Colostomy/Ileostomy/Ileal Conduit: {YES / DI:00604}       Date of Last BM: ***    Intake/Output Summary (Last 24 hours) at 2022 0815  Last data filed at 2022 7920  Gross per 24 hour   Intake 1947.74 ml   Output 350 ml   Net 1597.74 ml     I/O last 3 completed shifts:   In: 2547.7 [I.V.:1632.7; NG/GT:915]  Out: 350 [Urine:350]    Safety Concerns:     [address] Safety Concerns:930158092}    Impairments/Disabilities:      [address] Impairments/Disabilities:925225265}    Nutrition Therapy:  Current Nutrition Therapy:   [address] Diet List:932071639}    Routes of Feeding: {P DME Other Feedings:773984110}  Liquids: {Slp liquid thickness:82130}  Daily Fluid Restriction: {CHP DME Yes amt example:766931216}  Last Modified Barium Swallow with Video (Video Swallowing Test): {Done Not Done XDVK:259890585}    Treatments at the Time of Hospital Discharge:   Respiratory Treatments: ***  Oxygen Therapy:  {Therapy; copd oxygen:78801}  Ventilator:    { CC Vent GEQU:486250293}    Rehab Therapies: {THERAPEUTIC INTERVENTION:8499590703}  Weight Bearing Status/Restrictions: [employer]  Weight Bearin}  Other Medical Equipment (for information only, NOT a DME order):  {EQUIPMENT:328527223}  Other Treatments: ***    Patient's personal belongings (please select all that are sent with patient):  {CHP DME Belongings:135491451}    RN SIGNATURE:  {Esignature:399260020}    CASE MANAGEMENT/SOCIAL WORK SECTION    Inpatient Status Date: ***    Readmission Risk Assessment Score:  Readmission Risk              Risk of Unplanned Readmission:  [id number] Discharging to Facility/ Agency   Name:   Address:  Phone:  Fax:    Dialysis Facility (if applicable)   Name:  Address:  Dialysis Schedule:  Phone:  Fax:    / signature: {Esignature:721129117}    PHYSICIAN SECTION    Prognosis: {Prognosis:3228258655}    Condition at Discharge: Thea Connelly Patient Condition:742313056}    Rehab Potential (if transferring to Rehab): {Prognosis:5867015750}    Recommended Labs or Other Treatments After Discharge: ***    Physician Certification: I certify the above information and transfer of Rob Simmons  is necessary for the continuing treatment of the diagnosis listed and that she requires {Admit to Appropriate Level of Care:88519} for {GREATER/LESS:473446868} 30 days.      Update Admission H&P: {CHP DME Changes in RRSKV:064136321}    PHYSICIAN SIGNATURE:  Electronically signed by Walter Landa MD on 7/20/22 at 8:15 AM EDT

## 2022-07-20 NOTE — PROGRESS NOTES
Department of Orthopedic Surgery  Resident Progress Note    Patient seen and examined. Her pain is well controlled, she has no complaints. Status post PEG tube placement    VITALS:  /65   Pulse 85   Temp 98 °F (36.7 °C) (Infrared)   Resp 16   Ht 5' 2\" (1.575 m)   Wt 91 lb 3.2 oz (41.4 kg)   SpO2 97%   BMI 16.68 kg/m²     General: Awake and alert, no acute distress    MUSCULOSKELETAL:   right lower extremity:  Aquacel dressing over right hip, strikethrough but all blood contained within   Compartments soft and compressible  +PF/DF/EHL  +2/4 DP & PT pulses, Brisk Cap refill, Toes warm and perfused  Distal sensation grossly intact to Peroneals, Sural, Saphenous, and tibial nrs    CBC:   Lab Results   Component Value Date/Time    WBC 11.7 07/20/2022 03:38 AM    HGB 8.5 07/20/2022 03:38 AM    HCT 26.6 07/20/2022 03:38 AM     07/20/2022 03:38 AM     PT/INR:    Lab Results   Component Value Date/Time    PROTIME 12.8 07/18/2022 07:43 AM    INR 1.1 07/18/2022 07:43 AM         ASSESSMENT  S/P right hip ORIF with cephalomedullary nail on 7/15    PLAN      Continue physical therapy and protocol: WBAT -R LE  Deep venous thrombosis prophylaxis -per primary  PT/OT  Pain Control: IV and PO  D/C Plan: From an orthopedic standpoint, the patient can be discharged whenever medically stable and with appropriate plan in place.   Orthopedics will sign off at this time

## 2022-07-20 NOTE — PROGRESS NOTES
Cardiology  Progress Note      SUBJECTIVE:  Patient is lethargic. Sluggish responses to verbal stimuli. Current Inpatient Medications  Current Facility-Administered Medications: metoprolol tartrate (LOPRESSOR) tablet 50 mg, 50 mg, Oral, BID  sodium chloride flush 0.9 % injection 5-40 mL, 5-40 mL, IntraVENous, 2 times per day  sodium chloride flush 0.9 % injection 5-40 mL, 5-40 mL, IntraVENous, PRN  0.9 % sodium chloride infusion, 25 mL, IntraVENous, PRN  pantoprazole (PROTONIX) injection 40 mg, 40 mg, IntraVENous, Daily  cefTRIAXone (ROCEPHIN) 1,000 mg in sterile water 10 mL IV syringe, 1,000 mg, IntraVENous, Q24H  aspirin chewable tablet 81 mg, 81 mg, Oral, BID WC  morphine (PF) injection 2 mg, 2 mg, IntraVENous, Q2H PRN **OR** morphine injection 4 mg, 4 mg, IntraVENous, Q2H PRN  oxyCODONE (ROXICODONE) immediate release tablet 5 mg, 5 mg, Oral, Q4H PRN **OR** oxyCODONE (ROXICODONE) immediate release tablet 10 mg, 10 mg, Oral, Q4H PRN      Physical  VITALS:  /69   Pulse 84   Temp 97.7 °F (36.5 °C) (Oral)   Resp 18   Ht 5' 2\" (1.575 m)   Wt 91 lb 3.2 oz (41.4 kg)   SpO2 97%   BMI 16.68 kg/m²   CURRENT TEMPERATURE:  Temp: 97.7 °F (36.5 °C)  CONSTITUTIONAL: No acute distress. EYES: Vision is intact. ENT: No sore throat. No ear drainage. NECK: No JVD. BACK: Symmetric. LUNGS:  diminished breath sounds right base and left base  CARDIOVASCULAR:  normal S1 and S2 and murmurs include systolic murmur I/VI located at left sternal border without radiation  ABDOMEN:  non-distended  NEUROLOGIC: No focal deficits. EXTREMITIES: No edema cyanosis or clubbing.     DATA:      ECG:  I have reviewed EKG with the following interpretation:  normal sinus rhythm with PACs    Cardiology Labs:  BMP:    Lab Results   Component Value Date/Time     07/20/2022 03:38 AM    K 3.5 07/20/2022 03:38 AM     07/20/2022 03:38 AM    CO2 30 07/20/2022 03:38 AM    BUN 21 07/20/2022 03:38 AM     CBC:    Lab Results Component Value Date/Time    WBC 11.7 07/20/2022 03:38 AM    RBC 2.83 07/20/2022 03:38 AM    HGB 9.5 07/20/2022 12:02 PM    HCT 26.6 07/20/2022 03:38 AM    MCV 94.0 07/20/2022 03:38 AM    RDW 14.7 07/20/2022 03:38 AM     07/20/2022 03:38 AM     PT/INR:  No results found for: PTINR  TROPONIN:  No components found for: TROP    ASSESSMENT    1.right hip fracture. Patient underwent surgery and she tolerated that fairly well from cardiac standpoint. 2.sinus tachycardia with PACs. On metoprolol. Less frequent PACs. 3.aspiration problem. Status post PEG tube.

## 2022-07-20 NOTE — PROGRESS NOTES
[facility]  [facility]  Progress Note    NAME: Dawn Ramos  MR:  [id number]  :   10/17/1928  DATE OF SERVICE:22    This is a face to face encounter with Dawn Ramos 80 y.o. female on 22  Elements of this note, including Diagnosis,  Interval History, Past Medical/Surgical/Family/Social Histories, ROS, physical exam, and Assessment and Plan were copied and pasted from Previous. Updates have been made where noted and reflect current exam and medical decision making from the DOS of this encounter. CHIEF COMPLAINT     ID following for   Chief Complaint   Patient presents with    Fall     HISTORY OF PRESENT ILLNESS     Pt seen and examined  22  In bed asleep arouses briefly  Daughter present  Upset about pt not being medicated with pain        Patient is tolerating medications. No reported adverse drug reactions. REVIEW OF SYSTEMS     As stated above in the chief complaint, otherwise negative. CURRENT MEDICATIONS      metoprolol tartrate  50 mg Oral BID    sodium chloride flush  5-40 mL IntraVENous 2 times per day    pantoprazole  40 mg IntraVENous Daily    cefTRIAXone (ROCEPHIN) IV  1,000 mg IntraVENous Q24H    aspirin  81 mg Oral BID WC     Continuous Infusions:   sodium chloride       PRN Meds:sodium chloride flush, sodium chloride, morphine **OR** morphine, oxyCODONE **OR** oxyCODONE    PHYSICAL EXAM     /69   Pulse 84   Temp 97.7 °F (36.5 °C) (Oral)   Resp 18   Ht 5' 2\" (1.575 m)   Wt 91 lb 3.2 oz (41.4 kg)   SpO2 97%   BMI 16.68 kg/m²   Temp  Av.9 °F (36.6 °C)  Min: 97.7 °F (36.5 °C)  Max: 98 °F (36.7 °C)  Constitutional:  The patient is  supine thin  Skin:    Warm and dry. HEENT:    AT/NC oral mucosa dry  Chest:    Symmetrical expansion. Dec bs bases  Cardiovascular:  S1 and S2 are rhythmic and regular. Abdomen:   Positive bowel sounds to auscultation. Benign to palpation.    Extremities:    RIGH THIP DRESSED  CNS Arouses   Lines: piv         DIAGNOSTIC RESULTS   Radiology:    Recent Labs     07/18/22  0743 07/18/22  1111 07/19/22  0716 07/19/22  1920 07/20/22  0338 07/20/22  1202   WBC 12.3*  --  9.8  --  11.7*  --    RBC 2.75*  --  2.97*  --  2.83*  --    HGB 8.0*   < > 8.7* 8.5* 8.5* 9.5*   HCT 27.4*  --  29.0*  --  26.6*  --    MCV 99.6  --  97.6  --  94.0  --    MCH 29.1  --  29.3  --  30.0  --    MCHC 29.2*  --  30.0*  --  32.0  --    RDW 15.3*  --  14.8  --  14.7  --      --  194  --  193  --    MPV 10.4  --  10.4  --  10.3  --     < > = values in this interval not displayed. Recent Labs     07/18/22  0743 07/19/22  0716 07/20/22  0338   * 146 137   K 3.6 3.4* 3.5   * 107 101   CO2 29 29 30*   BUN 47* 30* 21   CREATININE 0.7 0.5 0.5   GLUCOSE 107* 104* 145*   PROT 5.5* 5.4* 4.8*   LABALBU 3.0* 3.0* 2.6*   CALCIUM 9.1 8.9 8.5*   BILITOT 0.8 1.1 0.7   ALKPHOS 60 61 64   AST 30 26 24   ALT 16 17 17       No results found for: CRP  Lab Results   Component Value Date    SEDRATE 12 02/04/2013     Recent Labs     07/18/22  0743 07/19/22  0716 07/20/22  0338   INR 1.1  --   --    PROTIME 12.8*  --   --    AST 30 26 24   ALT 16 17 17            Microbiology:      FINAL IMPRESSION    Pt had   Chief Complaint   Patient presents with    Fall    Admitted for Closed fracture of right hip, initial encounter ([facility] [S72.001A]  Hip fracture requiring operative repair, left, open type I or II, initial encounter ([address] [S72.002B]  Fall on same level from slipping, tripping or stumbling, initial encounter [W01. 0XXA]  Traumatic rhabdomyolysis, initial encounter ([facility] Hershal Brine. 6XXA]  On treatment for   Leukocytosis stable afebrile  s/p left hip fracture  Procedure(s): 7/15  RIGHT INTROCHANTERIC CEPHALOMEDULLARY NAIL  INSERTION  UTI      Stop atbx            Monitor labs    Imaging and labs were reviewed per medical records.         Electronically signed by Kassandra Lisa MD on 7/20/2022 at 2:07 PM

## 2022-07-20 NOTE — PLAN OF CARE
Problem: Pain  Goal: Verbalizes/displays adequate comfort level or baseline comfort level  Outcome: Progressing     Problem: Safety - Adult  Goal: Free from fall injury  Outcome: Progressing     Problem: ABCDS Injury Assessment  Goal: Absence of physical injury  Outcome: Progressing     Problem: Skin/Tissue Integrity  Goal: Absence of new skin breakdown  Description: 1. Monitor for areas of redness and/or skin breakdown  2. Assess vascular access sites hourly  3. Every 4-6 hours minimum:  Change oxygen saturation probe site  4. Every 4-6 hours:  If on nasal continuous positive airway pressure, respiratory therapy assess nares and determine need for appliance change or resting period.   Outcome: Progressing     Problem: Discharge Planning  Goal: Discharge to home or other facility with appropriate resources  Outcome: Progressing     Problem: Nutrition Deficit:  Goal: Optimize nutritional status  Outcome: Progressing

## 2022-07-21 NOTE — PROGRESS NOTES
Cardiology  Progress Note      SUBJECTIVE:  Lethargic. Sluggish responses to verbal stimuli. Current Inpatient Medications  Current Facility-Administered Medications: metoprolol tartrate (LOPRESSOR) tablet 50 mg, 50 mg, Oral, TID  sodium chloride flush 0.9 % injection 5-40 mL, 5-40 mL, IntraVENous, 2 times per day  sodium chloride flush 0.9 % injection 5-40 mL, 5-40 mL, IntraVENous, PRN  0.9 % sodium chloride infusion, 25 mL, IntraVENous, PRN  pantoprazole (PROTONIX) injection 40 mg, 40 mg, IntraVENous, Daily  cefTRIAXone (ROCEPHIN) 1,000 mg in sterile water 10 mL IV syringe, 1,000 mg, IntraVENous, Q24H  aspirin chewable tablet 81 mg, 81 mg, Oral, BID WC  morphine (PF) injection 2 mg, 2 mg, IntraVENous, Q2H PRN **OR** morphine injection 4 mg, 4 mg, IntraVENous, Q2H PRN  oxyCODONE (ROXICODONE) immediate release tablet 5 mg, 5 mg, Oral, Q4H PRN **OR** oxyCODONE (ROXICODONE) immediate release tablet 10 mg, 10 mg, Oral, Q4H PRN      Physical  VITALS:  /67   Pulse 92   Temp 98.9 °F (37.2 °C) (Oral)   Resp 22   Ht 5' 2\" (1.575 m)   Wt 91 lb 3.2 oz (41.4 kg)   SpO2 96%   BMI 16.68 kg/m²   CURRENT TEMPERATURE:  Temp: 98.9 °F (37.2 °C)  CONSTITUTIONAL: No acute distress. EYES: Vision is intact. ENT: No sore throat. No ear drainage. NECK: No JVD. BACK: Symmetric. LUNGS:  rhonchi right base and left base, diminished breath sounds right base and left base  CARDIOVASCULAR:  irregularly irregular rhythm  ABDOMEN:  non-distended  NEUROLOGIC: No focal deficits. EXTREMITIES: No edema cyanosis or clubbing.     DATA:      ECG:  I have reviewed EKG with the following interpretation:      Cardiology Labs:  BMP:    Lab Results   Component Value Date/Time     07/20/2022 03:38 AM    K 3.5 07/20/2022 03:38 AM     07/20/2022 03:38 AM    CO2 30 07/20/2022 03:38 AM    BUN 21 07/20/2022 03:38 AM     CBC:    Lab Results   Component Value Date/Time    WBC 11.7 07/20/2022 03:38 AM    RBC 2.83 07/20/2022 03:38 AM    HGB 8.4 07/21/2022 03:29 AM    HCT 26.6 07/20/2022 03:38 AM    MCV 94.0 07/20/2022 03:38 AM    RDW 14.7 07/20/2022 03:38 AM     07/20/2022 03:38 AM     PT/INR:  No results found for: PTINR  TROPONIN:  No components found for: TROP    ASSESSMENT    1.right hip fracture. Patient underwent surgery and she tolerated that fairly well from cardiac standpoint. 2.sinus tachycardia with PACs. On metoprolol. Patient was still having runs of atrial tachycardia on the monitor today. Dose of metoprolol was increased further to 50 mg 3 times a day. 3.aspiration problem. Status post PEG tube. PLAN  As per orders.

## 2022-07-21 NOTE — PROGRESS NOTES
Department of Internal Medicine  General Internal Medicine  Attending Progress Note      SUBJECTIVE:    Patient seen and examined this morning  Awake , somewhat confused no distress  Complains of rt hip pain  PEG tube in place and tolerating tube feeds    Medications    Current Facility-Administered Medications: metoprolol tartrate (LOPRESSOR) tablet 50 mg, 50 mg, Oral, BID  sodium chloride flush 0.9 % injection 5-40 mL, 5-40 mL, IntraVENous, 2 times per day  sodium chloride flush 0.9 % injection 5-40 mL, 5-40 mL, IntraVENous, PRN  0.9 % sodium chloride infusion, 25 mL, IntraVENous, PRN  pantoprazole (PROTONIX) injection 40 mg, 40 mg, IntraVENous, Daily  cefTRIAXone (ROCEPHIN) 1,000 mg in sterile water 10 mL IV syringe, 1,000 mg, IntraVENous, Q24H  aspirin chewable tablet 81 mg, 81 mg, Oral, BID WC  morphine (PF) injection 2 mg, 2 mg, IntraVENous, Q2H PRN **OR** morphine injection 4 mg, 4 mg, IntraVENous, Q2H PRN  oxyCODONE (ROXICODONE) immediate release tablet 5 mg, 5 mg, Oral, Q4H PRN **OR** oxyCODONE (ROXICODONE) immediate release tablet 10 mg, 10 mg, Oral, Q4H PRN    Physical    VITALS:  /67   Pulse 92   Temp 98.9 °F (37.2 °C) (Oral)   Resp 22   Ht 5' 2\" (1.575 m)   Wt 91 lb 3.2 oz (41.4 kg)   SpO2 96%   BMI 16.68 kg/m²   TEMPERATURE:  Current - Temp: 98.9 °F (37.2 °C); Max - Temp  Av.3 °F (36.8 °C)  Min: 97.7 °F (36.5 °C)  Max: 98.9 °F (37.2 °C)  RESPIRATIONS RANGE: Resp  Av  Min: 18  Max: 22  PULSE RANGE: Pulse  Av  Min: 84  Max: 92  BLOOD PRESSURE RANGE:  Systolic (51ORW), LIP:067 , Min:114 , LCU:833   ; Diastolic (22ISW), KSF:96, Min:67, Max:69    PULSE OXIMETRY RANGE: SpO2  Av.5 %  Min: 96 %  Max: 97 %  24HR INTAKE/OUTPUT:    Intake/Output Summary (Last 24 hours) at 2022 0735  Last data filed at 2022 0423  Gross per 24 hour   Intake 615 ml   Output 500 ml   Net 115 ml         CONSTITUTIONAL: Awake, no distress, appears as stated age. Frail.   HEENT: Normocephalic atraumatic. No icterus, mild pallor  NECK: Supple, no JVD , no lymphadenopathy, no bruits, no thyromegaly  LUNGS: Clear to auscultation bilaterally. Diminished over bases  CARDIOVASCULAR: Regularly irregular, no murmur ,rub or gallop. ABDOMEN: Soft, nontender, bowel sounds are positive, no organomegaly appreciated. NEUROLOGIC: Awake, alert, oriented only partially in time, no focal deficits noted. EXT: No edema, clubbing, or cyanosis. CBC with Differential:    Lab Results   Component Value Date/Time    WBC 11.7 07/20/2022 03:38 AM    RBC 2.83 07/20/2022 03:38 AM    HGB 8.4 07/21/2022 03:29 AM    HCT 26.6 07/20/2022 03:38 AM     07/20/2022 03:38 AM    MCV 94.0 07/20/2022 03:38 AM    MCH 30.0 07/20/2022 03:38 AM    MCHC 32.0 07/20/2022 03:38 AM    RDW 14.7 07/20/2022 03:38 AM    SEGSPCT 73 02/04/2013 02:45 PM    METASPCT 0.9 07/20/2022 03:38 AM    LYMPHOPCT 7.9 07/20/2022 03:38 AM    MONOPCT 3.5 07/20/2022 03:38 AM    BASOPCT 0.2 07/20/2022 03:38 AM    MONOSABS 0.47 07/20/2022 03:38 AM    LYMPHSABS 0.94 07/20/2022 03:38 AM    EOSABS 0.00 07/20/2022 03:38 AM    BASOSABS 0.00 07/20/2022 03:38 AM     CMP:    Lab Results   Component Value Date/Time     07/20/2022 03:38 AM    K 3.5 07/20/2022 03:38 AM     07/20/2022 03:38 AM    CO2 30 07/20/2022 03:38 AM    BUN 21 07/20/2022 03:38 AM    CREATININE 0.5 07/20/2022 03:38 AM    GFRAA >60 07/20/2022 03:38 AM    LABGLOM >60 07/20/2022 03:38 AM    GLUCOSE 145 07/20/2022 03:38 AM    PROT 4.8 07/20/2022 03:38 AM    LABALBU 2.6 07/20/2022 03:38 AM    CALCIUM 8.5 07/20/2022 03:38 AM    BILITOT 0.7 07/20/2022 03:38 AM    ALKPHOS 64 07/20/2022 03:38 AM    AST 24 07/20/2022 03:38 AM    ALT 17 07/20/2022 03:38 AM       Assessment and plan    1. S/P Fall. 2.Rt intertrochanteric fracture of the neck of femur, Orthopedics following , S/P ORIF with intramedullary nail 7/15th  Pain controlled. PT/OT, awaiting rehab placement.   Complains of Rt hip pain , will check x ray Rt hip  3. Sinus tachycardia with PAC's   Controlled rate on Lopressor 50 mg bid per PEG  4. 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 7.Leukocytosis, urine with pyuria and possible aspiration pneumonia, resolved. ID following and iv Rocephin discontinued per ID . Blood and urine cultures negative  8. Hypernatremia, resolved, dc iv fluids  9. Anemia, post op Hgb at 8.4  10. Senile Dementia most likely underlying her confusion.   D/W Daughter Zofia Wills over the phone 7/19th  Plan is discharge to rehab ? later today , awaiting precert    Kan Griffin MD, MD.  7:35 AM  7/21/2022

## 2022-07-21 NOTE — PROGRESS NOTES
[facility]  [facility]  Progress Note    NAME: Franklin Murphy  MR:  [id number]  :   10/17/1928  DATE OF SERVICE:22    This is a face to face encounter with Franklin Murphy 80 y.o. female on 22  Elements of this note, including Diagnosis,  Interval History, Past Medical/Surgical/Family/Social Histories, ROS, physical exam, and Assessment and Plan were copied and pasted from Previous. Updates have been made where noted and reflect current exam and medical decision making from the DOS of this encounter. CHIEF COMPLAINT     ID following for   Chief Complaint   Patient presents with    Fall     HISTORY OF PRESENT ILLNESS     Pt seen and examined  22  TMAX99.5   In bed asleep arouses briefly HAS PAIN   BLE CALF DRESSING CHANGED        Patient is tolerating medications. No reported adverse drug reactions. REVIEW OF SYSTEMS     As stated above in the chief complaint, otherwise negative. CURRENT MEDICATIONS      metoprolol tartrate  50 mg Oral TID    sodium chloride flush  5-40 mL IntraVENous 2 times per day    pantoprazole  40 mg IntraVENous Daily    cefTRIAXone (ROCEPHIN) IV  1,000 mg IntraVENous Q24H    aspirin  81 mg Oral BID WC     Continuous Infusions:   sodium chloride       PRN Meds:polyethylene glycol, sodium chloride flush, sodium chloride, morphine **OR** morphine, oxyCODONE **OR** oxyCODONE    PHYSICAL EXAM     /68   Pulse (!) 113   Temp 98.8 °F (37.1 °C) (Oral)   Resp 20   Ht 5' 2\" (1.575 m)   Wt 91 lb 3.2 oz (41.4 kg)   SpO2 95%   BMI 16.68 kg/m²   Temp  Av.1 °F (37.3 °C)  Min: 98.8 °F (37.1 °C)  Max: 99.5 °F (37.5 °C)  Constitutional:  The patient is  supine thin  Skin:    Warm and dry. HEENT:    AT/NC oral mucosa dry  Chest:    Symmetrical expansion. Dec bs bases  Cardiovascular:  S1 and S2 are rhythmic and regular. Abdomen:   Positive bowel sounds to auscultation. Benign to palpation.    Extremities:    RIGH THIP DRESSED  CNS    Arouses   Lines: piv         DIAGNOSTIC RESULTS   Radiology:    Recent Labs     07/19/22  0716 07/19/22  1920 07/20/22  0338 07/20/22  1202 07/20/22  1843 07/21/22  0329   WBC 9.8  --  11.7*  --   --   --    RBC 2.97*  --  2.83*  --   --   --    HGB 8.7*   < > 8.5* 9.5* 8.3* 8.4*   HCT 29.0*  --  26.6*  --   --   --    MCV 97.6  --  94.0  --   --   --    MCH 29.3  --  30.0  --   --   --    MCHC 30.0*  --  32.0  --   --   --    RDW 14.8  --  14.7  --   --   --      --  193  --   --   --    MPV 10.4  --  10.3  --   --   --     < > = values in this interval not displayed. Recent Labs     07/19/22  0716 07/20/22  0338    137   K 3.4* 3.5    101   CO2 29 30*   BUN 30* 21   CREATININE 0.5 0.5   GLUCOSE 104* 145*   PROT 5.4* 4.8*   LABALBU 3.0* 2.6*   CALCIUM 8.9 8.5*   BILITOT 1.1 0.7   ALKPHOS 61 64   AST 26 24   ALT 17 17       No results found for: CRP  Lab Results   Component Value Date    SEDRATE 12 02/04/2013     Recent Labs     07/19/22  0716 07/20/22  0338   AST 26 24   ALT 17 17            Microbiology:      FINAL IMPRESSION    Pt had   Chief Complaint   Patient presents with    Fall    Admitted for Closed fracture of right hip, initial encounter ([facility] [S72.001A]  Hip fracture requiring operative repair, left, open type I or II, initial encounter ([facility] [S72.002B]  Fall on same level from slipping, tripping or stumbling, initial encounter [W01. 0XXA]  Traumatic rhabdomyolysis, initial encounter ([facility] Amanda Amin. 6XXA]  On treatment for   Leukocytosis stable   Febrile THIS AM CHECK CZ  s/p left hip fracture  Procedure(s): 7/15  RIGHT INTROCHANTERIC CEPHALOMEDULLARY NAIL  INSERTION  UTI      BLOOD/URINE CX   VANCO/FLUCONAZOLE  CXRY          Monitor labs    Imaging and labs were reviewed per medical records.         Electronically signed by Bisi Rodriguez MD on 7/21/2022 at 12:51 PM

## 2022-07-21 NOTE — PROGRESS NOTES
Physical Therapy        2586/7431-09    Patient unavailable for physical therapy treatment due to patient out of room for xray. Will attempt session at later time/date.      Adriana Basilio, PTA  #[id number]

## 2022-07-21 NOTE — PROGRESS NOTES
Physical Therapy        0584/3765-38    Patient unavailable for physical therapy treatment due to patient not appropriate for therapy at this time due to not being alert and low BP (55/37). Will attempt session at later time/date.      Ally Leila, DANNY  #[id number]

## 2022-07-21 NOTE — PLAN OF CARE
Problem: Pain  Goal: Verbalizes/displays adequate comfort level or baseline comfort level  7/21/2022 0102 by Reese Murphy RN  Outcome: Progressing  7/20/2022 1810 by Cristel Singh RN  Outcome: Progressing     Problem: Safety - Adult  Goal: Free from fall injury  7/21/2022 0102 by Reese Murphy RN  Outcome: Progressing  7/20/2022 1810 by Cristel Singh RN  Outcome: Progressing     Problem: ABCDS Injury Assessment  Goal: Absence of physical injury  7/21/2022 0102 by Reese Murphy RN  Outcome: Progressing  7/20/2022 1810 by Cristel Singh RN  Outcome: Progressing     Problem: Skin/Tissue Integrity  Goal: Absence of new skin breakdown  Description: 1. Monitor for areas of redness and/or skin breakdown  2. Assess vascular access sites hourly  3. Every 4-6 hours minimum:  Change oxygen saturation probe site  4. Every 4-6 hours:  If on nasal continuous positive airway pressure, respiratory therapy assess nares and determine need for appliance change or resting period.   7/20/2022 1810 by Cristel Singh RN  Outcome: Progressing     Problem: Discharge Planning  Goal: Discharge to home or other facility with appropriate resources  7/20/2022 1810 by Cristel Singh RN  Outcome: Progressing     Problem: Nutrition Deficit:  Goal: Optimize nutritional status  7/20/2022 1810 by Cristel Singh RN  Outcome: Progressing

## 2022-07-22 NOTE — PROGRESS NOTES
Cardiology  Progress Note      SUBJECTIVE:  chest sounds congested. Sluggish responses to verbal stimuli. Current Inpatient Medications  Current Facility-Administered Medications: ferric gluconate (FERRLECIT) 125 mg in sodium chloride 0.9 % 100 mL IVPB, 125 mg, IntraVENous, Daily  metoprolol tartrate (LOPRESSOR) tablet 50 mg, 50 mg, Oral, TID  polyethylene glycol (GLYCOLAX) packet 17 g, 17 g, Per G Tube, Daily PRN  sodium chloride flush 0.9 % injection 5-40 mL, 5-40 mL, IntraVENous, 2 times per day  sodium chloride flush 0.9 % injection 5-40 mL, 5-40 mL, IntraVENous, PRN  0.9 % sodium chloride infusion, 25 mL, IntraVENous, PRN  pantoprazole (PROTONIX) injection 40 mg, 40 mg, IntraVENous, Daily  aspirin chewable tablet 81 mg, 81 mg, Oral, BID WC  morphine (PF) injection 2 mg, 2 mg, IntraVENous, Q2H PRN **OR** morphine injection 4 mg, 4 mg, IntraVENous, Q2H PRN  oxyCODONE (ROXICODONE) immediate release tablet 5 mg, 5 mg, Oral, Q4H PRN **OR** [DISCONTINUED] oxyCODONE (ROXICODONE) immediate release tablet 10 mg, 10 mg, Oral, Q4H PRN      Physical  VITALS:  /68   Pulse (!) 104   Temp 97.8 °F (36.6 °C) (Axillary)   Resp 18   Ht 5' 2\" (1.575 m)   Wt 91 lb 3.2 oz (41.4 kg)   SpO2 96%   BMI 16.68 kg/m²   CURRENT TEMPERATURE:  Temp: 97.8 °F (36.6 °C)  CONSTITUTIONAL: No acute distress. EYES: Vision is intact. ENT: No sore throat. No ear drainage. NECK: No JVD. BACK: Symmetric. LUNGS:  rhonchi right base and left base, diminished breath sounds right base and left base  CARDIOVASCULAR:  normal S1 and S2 and no S3  ABDOMEN:  non-distended  NEUROLOGIC: sluggish responses to verbal stimuli. EXTREMITIES: No edema cyanosis or clubbing.     DATA:        Cardiology Labs:  BMP:    Lab Results   Component Value Date/Time     07/20/2022 03:38 AM    K 3.5 07/20/2022 03:38 AM     07/20/2022 03:38 AM    CO2 30 07/20/2022 03:38 AM    BUN 21 07/20/2022 03:38 AM     CBC:    Lab Results   Component Value Date/Time    WBC 11.7 07/20/2022 03:38 AM    RBC 2.83 07/20/2022 03:38 AM    HGB 7.8 07/22/2022 11:16 AM    HCT 26.6 07/20/2022 03:38 AM    MCV 94.0 07/20/2022 03:38 AM    RDW 14.7 07/20/2022 03:38 AM     07/20/2022 03:38 AM     PT/INR:  No results found for: PTINR  TROPONIN:  No components found for: TROP    ASSESSMENT    1.right hip fracture. Patient underwent surgery and she tolerated that fairly well from cardiac standpoint. 2.sinus tachycardia with PACs. On metoprolol. Patient was still having runs of atrial tachycardia on the monitor today. Dose of metoprolol was increased further to 50 mg 3 times a day. 3.aspiration problem. Status post PEG tube. PLAN  As per orders.

## 2022-07-22 NOTE — PROGRESS NOTES
spine, degenerative with facet syndrome    Hip fracture requiring operative repair, left, open type I or II, initial encounter ([facility]    Severe protein-calorie malnutrition ([facility]       ASSESSMENT of current deficits: Patient exhibits decreased strength, balance, endurance, range of motion, coordination, and pain    impairing functional mobility, transfers, gait , gait distance, and tolerance to activity are barriers to d/c and require skilled intervention during hospital stay to attain pre hospital level of function. Patient oriented x 1, confused throughout tx session, having difficulty with following cueing. Patient requires assist for supine exercises and Max assist to edge of bed and back to bed. Patient with poor tolerance to sitting upright ;  only 2 minutes with hard left lateral lean to off weight R hip and requires Max assist for support on edge of bed. Patient would benefit from continued therapy for above deficits to decrease all risk. PHYSICAL THERAPY  PLAN OF CARE       Physical therapy plan of care is established based on physician order,  patient diagnosis and clinical assessment    Current Treatment Recommendations:    -Bed Mobility: Lower extremity exercises , Upper extremity exercises , and Trunk control activities   -Sitting Balance: Incorporate reaching activities to activate trunk muscles , Hands on support to maintain midline , and Facilitate postural control in all planes   -Standing Balance: Perform strengthening exercises in standing to promote motor control with or without upper extremity support  and Instruct patient on adequate base of support to maintain balance  -Transfers: Provide instruction on proper hand and foot position for adequate transfer of weight onto lower extremities and use of gait device if needed, Cues for hand placement, technique and safety.  Provide stabilization to prevent fall , and Facilitate weight shift forward on to lower extremities and provide necessary bilateral paravertebral facet  lumbar  #2    NERVE BLOCK Bilateral 11/12/14    paravertebral facet lumbar #3    NERVE BLOCK Right 05/22/2017    transforaminal cervical #1    NERVE BLOCK Right 06/05/2017    transforaminal #2    NERVE BLOCK Bilateral 06/12/2017    lumbar paravertebral facet #1    NERVE BLOCK Bilateral 06/21/2017    Bilatera lumbar paravertebral facet block 32 l3-4 l4-5 l5-s1    NERVE BLOCK Bilateral 09/09/2020    lumbar facet block    NERVE BLOCK Bilateral 9/9/2020    BILATERAL LUMBAR FACET BLOCKS AT L3-4,L4-5,L5-S1 x2 UNDER X-RAY #1 performed by Echo Burgess DO at [address] Bilateral 09/16/2020    lumbar facet paravertebral    NERVE BLOCK Bilateral 9/16/2020    BILATERAL LUMBAR FACET BLOCKS AT L3-4,L4-5,L5-S1 x2 UNDER X-RAY #2 performed by Echo Burgess DO at [address] Right 09 20 2013    radiofrequency neurolysis medial branch nerve cervical    TOTAL HIP ARTHROPLASTY      Left    UPPER GASTROINTESTINAL ENDOSCOPY N/A 7/18/2022    EGD ESOPHAGOGASTRODUODENOSCOPY PEG TUBE INSERTION performed by Lonny Desouza MD at [facility]:    Precautions:  Continuous Pulse Oximetry , falls, alarm, and O2 ,right lower extremity FWB (full weight bearing)     Social history: Patient lives with daughter in a  3 story home in [facility] on 3rd level   with  multiple steps   to enter with [place] [employer] owned: [employer]olson,       [address] [facility]   How much difficulty turning over in bed?: A Lot  How much difficulty sitting down on / standing up from a chair with arms?: Unable  How much difficulty moving from lying on back to sitting on side of bed?: A Lot  How much help from another person moving to and from a bed to a chair?: Total  How much help from another person needed to walk in hospital room?: Total  How much help from another person for climbing 3-5 steps with a railing?: Total  AM-PAC Inpatient Mobility Raw Score : 8  AM-PAC Inpatient T-Scale Score : 28.52  Mobility Inpatient CMS 0-100% Score: 86.62  Mobility Inpatient CMS G-Code Modifier : CM    Nursing cleared patient for PT treatment. Nursing reports patient was recently medicated for pain. OBJECTIVE:   Initial Evaluation  Date: 7/17/2022 Treatment Date:  7/22/2022   Short Term/ Long Term   Goals   Was pt agreeable to Eval/treatment? Yes yes    Pain Level  Unrated however with movement of  Right hip grimace and vocalized pain No number assigned to pain     Bed Mobility  Rolling: Maximal assist of 1    Supine to sit: Maximal assist of 1    Sit to supine: Maximal assist of 1    Scooting: Maximal assist of 1   Rolling: Maximal assist of 1   Supine to sit: Maximal assist of 1 use of TAPS  Sit to supine: Maximal assist of 1   Scooting: Maximal assist of 1  Dependent x2 to head of bed    Rolling: Independent    Supine to sit: Independent    Sit to supine: Independent    Scooting: Independent     Transfers Sit to stand: Maximal assist of 1 anterior approach x 2 reps; attempted with walker however patient fearful; patient able to bear wt bilateral however 2 person assist for wheeled walker trf will be necessary next visit Sit to stand: Not assessed       Sit to stand: Modified Independent     Ambulation    not assessed  Not assessed   100 feet using  wheeled walker with Modified Independent    ROM Within functional limits except Right hip 50%, pt has significant internal rotation with inability to tolerate > neutral    Increase range of motion 10% of affected joints    Strength Within functional limits  except  Right lower extremity 2-/5    Within functional limits   Balance Sitting EOB:  poor   Dynamic Standing:  poor    Sitting EOB: poor hard left lateral lean, requires Max assist for upright.    Dynamic Standing: not assessed     Sitting EOB:  good    Dynamic Standing:  good wheeled walker      Patient is Alert & Oriented x person and follows one step directions  hard of hearing   Sensation:  Patient denies numbness/tingling  Edema:  yes right lower extremity   Vitals: 1 Liters of o2 via nasal cannula   Blood Pressure at rest   Blood Pressure during session     Heart Rate at rest   Heart Rate during session     SPO2 at rest 91% SPO2 during session %     Patient education   Patient educated on role of Physical Therapy, risks of immobility, safety and plan of care,  importance of mobility while in hospital , ankle pumps, quad set and glut set for edema control, blood clot prevention, safety , and positioning for skin integrity and comfort      Patient response to education:   Pt verbalized understanding Pt demonstrated skill Pt requires further education in this area   Yes Partial Yes       Treatment:  Patient practiced and was instructed in the following treatment:     Therapist educated and facilitated patient on techniques to increase safety and independence with bed mobility, balance, functional transfers, and functional mobility. Patient assisted with supine exercises in bed. Patient assisted to edge of bed and sat x 2 minutes,before requesting back to bed. Patient assisted to supine position and assist x2 to head of bed. Assisted nursing with rolling patient multiple times for depends change, changing bed linens, and chux pads. Patient positioned for comfort with pillow under R LE and blanket rolled up to avoid IR. Foam heel floaters donned. At end of session, patient in bed with alarm call light and phone within reach,   all lines and tubes intact, nursing notified. Patient would benefit from continued skilled Physical Therapy to improve functional independence and quality of life. Patient's/ family goals   rehab      Patient and or family understand(s) diagnosis, prognosis, and plan of care.        Frequency of treatments: Patient will be seen  twice daily  for therapeutic exercise, functional retraining, endurance activities, balance exercises, family and patient education. Time in 09:55  Time out 10:25    Total Treatment Time  30 minutes    CPT codes:  Therapeutic activities (74961)   18 minutes  1 unit(s)  Therapeutic exercises (38208)   12 minutes  1 unit(s)  Surya Reynolds  [facility]  LIC # [id number]

## 2022-07-22 NOTE — PROGRESS NOTES
Department of Internal Medicine  General Internal Medicine  Attending Progress Note  Chief Complaint   Patient presents with    Fall     SUBJECTIVE:    Reports that she is doing well. No fever or chills. No chest pain.     OBJECTIVE      Medications    Current Facility-Administered Medications: ferric gluconate (FERRLECIT) 125 mg in sodium chloride 0.9 % 100 mL IVPB, 125 mg, IntraVENous, Daily  metoprolol tartrate (LOPRESSOR) tablet 50 mg, 50 mg, Oral, TID  polyethylene glycol (GLYCOLAX) packet 17 g, 17 g, Per G Tube, Daily PRN  sodium chloride flush 0.9 % injection 5-40 mL, 5-40 mL, IntraVENous, 2 times per day  sodium chloride flush 0.9 % injection 5-40 mL, 5-40 mL, IntraVENous, PRN  0.9 % sodium chloride infusion, 25 mL, IntraVENous, PRN  pantoprazole (PROTONIX) injection 40 mg, 40 mg, IntraVENous, Daily  cefTRIAXone (ROCEPHIN) 1,000 mg in sterile water 10 mL IV syringe, 1,000 mg, IntraVENous, Q24H  aspirin chewable tablet 81 mg, 81 mg, Oral, BID WC  morphine (PF) injection 2 mg, 2 mg, IntraVENous, Q2H PRN **OR** morphine injection 4 mg, 4 mg, IntraVENous, Q2H PRN  oxyCODONE (ROXICODONE) immediate release tablet 5 mg, 5 mg, Oral, Q4H PRN **OR** [DISCONTINUED] oxyCODONE (ROXICODONE) immediate release tablet 10 mg, 10 mg, Oral, Q4H PRN  Physical    VITALS:  BP (!) 114/54   Pulse (!) 106   Temp 97.8 °F (36.6 °C) (Axillary)   Resp 18   Ht 5' 2\" (1.575 m)   Wt 91 lb 3.2 oz (41.4 kg)   SpO2 96%   BMI 16.68 kg/m²   CONSTITUTIONAL:  awake and alert, cachectic  EYES:  extra-ocular muscles intact and vision intact  ENT:  atraumatic  NECK:  supple, symmetrical, trachea midline  LUNGS:  no increased work of breathing, no retractions, and clear to auscultation  CARDIOVASCULAR:  normal apical pulses and normal S1 and S2  ABDOMEN:  normal bowel sounds, non-distended, and non-tender  MUSCULOSKELETAL:  there is no redness, warmth, or swelling of the joints  NEUROLOGIC:  Mental Status Exam:  Level of Alertness: awake  SKIN:  no bruising or bleeding  Data    CBC:   Lab Results   Component Value Date/Time    WBC 11.7 07/20/2022 03:38 AM    RBC 2.83 07/20/2022 03:38 AM    HGB 7.8 07/22/2022 11:16 AM    HCT 26.6 07/20/2022 03:38 AM    MCV 94.0 07/20/2022 03:38 AM    MCH 30.0 07/20/2022 03:38 AM    MCHC 32.0 07/20/2022 03:38 AM    RDW 14.7 07/20/2022 03:38 AM     07/20/2022 03:38 AM    MPV 10.3 07/20/2022 03:38 AM     BMP:    Lab Results   Component Value Date/Time     07/20/2022 03:38 AM    K 3.5 07/20/2022 03:38 AM     07/20/2022 03:38 AM    CO2 30 07/20/2022 03:38 AM    BUN 21 07/20/2022 03:38 AM    LABALBU 2.6 07/20/2022 03:38 AM    CREATININE 0.5 07/20/2022 03:38 AM    CALCIUM 8.5 07/20/2022 03:38 AM    GFRAA >60 07/20/2022 03:38 AM    LABGLOM >60 07/20/2022 03:38 AM    GLUCOSE 145 07/20/2022 03:38 AM       ASSESSMENT AND PLAN      1. Hip fracture requiring operative repair, right, open type I or II, initial encounter     2. Severe protein-calorie malnutrition     3. Malnutrition Severe:    4. Anemia due to acute blood loss:    5. Dysphagia    6. Hypernatremia    7. Dementia Senile:    Plans:  PT/OT  Anticoagulation per ortho  Pain control  Tube feeding in place  Added IV iron for anemia  Change H/H blood draw to BID  Transfuse if needed.   GI following

## 2022-07-22 NOTE — PROGRESS NOTES
Speech Language Pathology  SPEECH LANGUAGE PATHOLOGY  DAILY PROGRESS NOTE        PATIENT NAME:  Abdirashid Rodriguez      :  10/17/1928          TODAY'S DATE:  2022 ROOM:  [address] [place] [address]    Pt seen in f/u for dysphagia management, resting in bed on room air, in no acute distress. Pt was more lethargic this date and therefore had difficulty sustaining wakefulness to successfully engage in a structured task. SLP completed oral care with removal of mild amount of dried mucous from the oral cavity. Pt was somewhat more resistive to oral hygiene this date and attempted to bite the swab and clamp her teeth down several times. Pt was unable to follow commands for basic OMEs or other laryngeal/pharyngeal strengthening exercises this date. Pt has limited rehab potential at this time. Will cont as per POC.      Amanda Sanon, SLP  SP [id number]    CPT code(s) 26223  dysphagia tx  Total minutes :  13 minutes

## 2022-07-22 NOTE — PROGRESS NOTES
PROGRESS NOTE  By Adrian Nash M.D. [facility]  Dr. Dipti Mauricio M.D.,  Dr. Rober Avila M.D.,   Dr. Daryle Staple, D.O.,  Dr. Shawn Brewer M.D.,  Dr. Aaron Park D.O.,  Lori Crouch D.O. Rosalba Avendano  80 y.o.  female    SUBJECTIVE:  Confused and virtually nonverbal.  Granddaughter at bedside    OBJECTIVE:    BP (!) 114/54   Pulse (!) 106   Temp 97.8 °F (36.6 °C) (Axillary)   Resp 18   Ht 5' 2\" (1.575 m)   Wt 91 lb 3.2 oz (41.4 kg)   SpO2 96%   BMI 16.68 kg/m²     General: Elderly  female  HEENT: Anicteric sclera/moist oral mucosa  Neck: Appears supple with trachea midline  Chest: Symmetrical excursion/labored respirations  Cor: Appears regular  Abd.: Soft and nontender. PEG in place  Extr.:  Decreased muscle tone and bulk throughout  Skin: Warm and dry      DATA:    Monitor data reviewed -sinus rhythm noted.     Stool (measured) : 0 mL  Lab Results   Component Value Date/Time    WBC 11.7 07/20/2022 03:38 AM    RBC 2.83 07/20/2022 03:38 AM    HGB 7.9 07/22/2022 03:47 AM    HCT 26.6 07/20/2022 03:38 AM    MCV 94.0 07/20/2022 03:38 AM    MCH 30.0 07/20/2022 03:38 AM    MCHC 32.0 07/20/2022 03:38 AM    RDW 14.7 07/20/2022 03:38 AM     07/20/2022 03:38 AM    MPV 10.3 07/20/2022 03:38 AM     Lab Results   Component Value Date/Time     07/20/2022 03:38 AM    K 3.5 07/20/2022 03:38 AM     07/20/2022 03:38 AM    CO2 30 07/20/2022 03:38 AM    BUN 21 07/20/2022 03:38 AM    CREATININE 0.5 07/20/2022 03:38 AM    CALCIUM 8.5 07/20/2022 03:38 AM    PROT 4.8 07/20/2022 03:38 AM    LABALBU 2.6 07/20/2022 03:38 AM    BILITOT 0.7 07/20/2022 03:38 AM    ALKPHOS 64 07/20/2022 03:38 AM    AST 24 07/20/2022 03:38 AM    ALT 17 07/20/2022 03:38 AM     No results found for: LIPASE  No results found for: AMYLASE      ASSESSMENT/PLAN:  Patient Active Problem List   Diagnosis    Degenerative arthropathy of spinal facet joint, Multilevel of the cervical spine    DDD (degenerative disc disease), cervicalC6-C7    Spondylosis, lumbosacral    Lumbar facet arthropathy    Lumbar spinal stenosis    Osteoarthrosis, hip    Knee pain    Facet syndrome right C2-C6     Degenerative osteoarthrosis right C2-C6    Protruded cervical disc    Cervical radiculopathy    Neural foraminal stenosis of lumbar spine    Neural foraminal stenosis of cervical spine    Osteoarthritis of lumbar spine, degenerative with facet syndrome    Hip fracture requiring operative repair, left, open type I or II, initial encounter ([facility]    Severe protein-calorie malnutrition ([facility]     1. Dysphagia/Aspiration   -S/P PEG 7/18  -Tube feedings per admitting service     2. Anemia   -New anemia first noted in July of this year  -Iron deficient/normocytic  -Decreasing H&H without evidence of overt bleed  -Pending FOBT   -See discussion below regarding endoscopies    Above has been discussed in detail with patient's granddaughter at bedside. I have explained that PEG tube will need to stay in until tract is healed after which time it can be safely removed if this is what patient/family desire. I also have explained to the granddaughter that presence of PEG tube does not preclude patient from oral feeding which will dependent on patient ability to swallow and like of aspiration. I also have explained to the granddaughter downward trend of hemoglobin however no evidence of overt bleed. I have explained that further evaluation of her anemia will require colonoscopy and patient's granddaughter verbalized understanding and wishes to further discuss this with her mother. In the meanwhile await FOBT and consider CT of the abdomen and pelvis to rule out retroperitoneal/soft tissue hematoma. Syed Mock MD  7/22/2022  10:45 AM    NOTE:  This report was transcribed using voice recognition software. Every effort was made to ensure accuracy; however, inadvertent computerized transcription errors may be present.

## 2022-07-22 NOTE — PROGRESS NOTES
[facility]  [facility]  Progress Note    NAME: Gypsy Garza  MR:  [id number]  :   10/17/1928  DATE OF SERVICE:22    This is a face to face encounter with Gypsy Garza 80 y.o. female on 22  Elements of this note, including Diagnosis,  Interval History, Past Medical/Surgical/Family/Social Histories, ROS, physical exam, and Assessment and Plan were copied and pasted from Previous. Updates have been made where noted and reflect current exam and medical decision making from the DOS of this encounter. CHIEF COMPLAINT     ID following for   Chief Complaint   Patient presents with    Fall     HISTORY OF PRESENT ILLNESS     Pt seen and examined  22  [id number] afebrile this am   More awake has no c/o           Patient is tolerating medications. No reported adverse drug reactions. REVIEW OF SYSTEMS     As stated above in the chief complaint, otherwise negative. CURRENT MEDICATIONS      ferric gluconate (FERRLECIT) IVPB  125 mg IntraVENous Daily    metoprolol tartrate  50 mg Oral TID    sodium chloride flush  5-40 mL IntraVENous 2 times per day    pantoprazole  40 mg IntraVENous Daily    aspirin  81 mg Oral BID WC     Continuous Infusions:   sodium chloride       PRN Meds:polyethylene glycol, sodium chloride flush, sodium chloride, morphine **OR** morphine, oxyCODONE **OR** [DISCONTINUED] oxyCODONE    PHYSICAL EXAM     /68   Pulse (!) 104   Temp 97.8 °F (36.6 °C) (Axillary)   Resp 18   Ht 5' 2\" (1.575 m)   Wt 91 lb 3.2 oz (41.4 kg)   SpO2 96%   BMI 16.68 kg/m²   Temp  Av.8 °F (37.1 °C)  Min: 97.4 °F (36.3 °C)  Max: 100.7 °F (38.2 °C)  Constitutional:  The patient is  supine thin   Skin:    Warm and dry. HEENT:    AT/NC oral mucosa dry  Chest:    Symmetrical expansion. Dec bs bases  Cardiovascular:  S1 and S2 are rhythmic and regular. Abdomen:   Positive bowel sounds to auscultation. Benign to palpation.    Extremities:    Edema   CNS Arouses   Lines: heidy David          DIAGNOSTIC RESULTS   Radiology:    Recent Labs     07/20/22  0338 07/20/22  1202 07/21/22  1344 07/22/22  0347 07/22/22  1116   WBC 11.7*  --   --   --   --    RBC 2.83*  --   --   --   --    HGB 8.5*   < > 8.0* 7.9* 7.8*   HCT 26.6*  --   --   --   --    MCV 94.0  --   --   --   --    MCH 30.0  --   --   --   --    MCHC 32.0  --   --   --   --    RDW 14.7  --   --   --   --      --   --   --   --    MPV 10.3  --   --   --   --     < > = values in this interval not displayed. Recent Labs     07/20/22  0338      K 3.5      CO2 30*   BUN 21   CREATININE 0.5   GLUCOSE 145*   PROT 4.8*   LABALBU 2.6*   CALCIUM 8.5*   BILITOT 0.7   ALKPHOS 64   AST 24   ALT 17       No results found for: CRP  Lab Results   Component Value Date    SEDRATE 12 02/04/2013     Recent Labs     07/20/22  0338 07/21/22  1344   FERRITIN  --  411   AST 24  --    ALT 17  --             Microbiology:      FINAL IMPRESSION    Pt had   Chief Complaint   Patient presents with    Fall    Admitted for Closed fracture of right hip, initial encounter ([facility] [S72.001A]  Hip fracture requiring operative repair, left, open type I or II, initial encounter ([facility] [S72.002B]  Fall on same level from slipping, tripping or stumbling, initial encounter [W01. 0XXA]  Traumatic rhabdomyolysis, [facility] Murleen Sameer. 6XXA]  On treatment for   Leukocytosis stable   Febrile THIS AM CHECK CZ  s/p left hip fracture  Procedure(s): 7/15  RIGHT INTROCHANTERIC CEPHALOMEDULLARY NAIL  INSERTION  UTI      BLOOD/URINE CX   VANCO/FLUCONAZOLE redose as needed  CXRY noted     Check labs    Monitor labs    Imaging and labs were reviewed per medical records.         Electronically signed by Jackie Bonner MD on 7/22/2022 at 3:49 PM

## 2022-07-22 NOTE — PROGRESS NOTES
Attempted tx with pt; pt initially agreeable to OT treatment, however when lifting R LE from pillow, pt began saying \"no, no , no\";  positioned R LE in neutral position on pillow; attempted UE ex's, as pt agreeable, however when attempting UE ex's, pt c/o pain and refused therapy. Nsg notified. HOB greater than 30* d/t tube feed running. Will attempt tx with pt at later time/date. Hugo Cotton, [facility]

## 2022-07-22 NOTE — PROGRESS NOTES
Comprehensive Nutrition Assessment    Type and Reason for Visit:  Reassess    Nutrition Recommendations/Plan:   Continue current TF & monitor tolerance     Malnutrition Assessment:  Malnutrition Status:  Severe malnutrition (07/18/22 1403)    Context:  Chronic Illness     Findings of the 6 clinical characteristics of malnutrition:  Energy Intake:  75% or less estimated energy requirements for 1 month or longer  Weight Loss:  Unable to assess     Body Fat Loss:  Severe body fat loss Buccal region, Orbital   Muscle Mass Loss:  Severe muscle mass loss Clavicles (pectoralis & deltoids), Temples (temporalis)  Fluid Accumulation:  No significant fluid accumulation     Strength:  Not Performed    Nutrition Assessment:    Pt adm d/t R hip pain/fx s/p ORIF w/ cephalomedullary nail (7/15). Note severe malnutrition w/ Dysphagia/asp s/p failed MBSS, Noted s/p PEG. Continue current TF regimen & monitor tolerance    Nutrition Related Findings:    A/O x1, flat/soft/tender hypoactive BS, PEG,  I/O + 5.5L, +2 RLE edema. Wound Type: Multiple, Skin Tears, Surgical Incision       Current Nutrition Intake & Therapies:    Average Meal Intake: NPO  Average Supplements Intake: NPO  Diet NPO Exceptions are: Sips of Water with Meds  ADULT TUBE FEEDING; PEG; Standard with Fiber; Continuous; 20; Yes; 20; Q 6 hours; 40; 175; Q 6 hours; Protein; 1 daily    Anthropometric Measures:  Height: 5' 2\" (157.5 cm)  Ideal Body Weight (IBW): 110 lbs (50 kg)    Admission Body Weight: 91 lb 3.2 oz (41.4 kg)  Current Body Weight: 91 lb 3.2 oz (41.4 kg) (7/13 bed scale, CBW 106lbs. per RD ecounter (7/18) likely elevated w/ bed accessories), 82.9 % IBW.     Current BMI (kg/m2): 16.7  Usual Body Weight:  (lack measured wt hx per EMR)     Weight Adjustment For: No Adjustment        BMI Categories: Underweight (BMI less than 22) age over 72    Estimated Daily Nutrient Needs:  Energy Requirements Based On: Kcal/kg  Weight Used for Energy Requirements: Admission  Energy (kcal/day): 1015-0568  Weight Used for Protein Requirements: Admission  Protein (g/day): 75-85  Method Used for Fluid Requirements: 1 ml/kcal  Fluid (ml/day): 0265-4512    Nutrition Diagnosis:   Severe malnutrition, In context of chronic illness related to cognitive or neurological impairment as evidenced by Criteria as identified in malnutrition assessment, severe loss of subcutaneous fat, severe muscle loss, poor intake prior to admission, intake 0-25%    Nutrition Interventions:   Food and/or Nutrient Delivery: Continue Current Tube Feeding  Nutrition Education/Counseling: No recommendation at this time  Coordination of Nutrition Care: Continue to monitor while inpatient     Goals:     Goals:  Tolerate nutrition support at goal rate     Nutrition Monitoring and Evaluation:   Behavioral-Environmental Outcomes: None Identified  Food/Nutrient Intake Outcomes: Enteral Nutrition Intake/Tolerance  Physical Signs/Symptoms Outcomes: Biochemical Data, GI Status, Fluid Status or Edema, Weight, Skin, Nutrition Focused Physical Findings    Discharge Planning:    Enteral Nutrition     Mason Fairbanks RD  Contact: [phone]

## 2022-07-23 NOTE — PROGRESS NOTES
[facility]  [facility]  Progress Note    NAME: Gypsy Garza  MR:  [id number]  :   10/17/1928  DATE OF SERVICE:22    This is a face to face encounter with Gypsy Garza 80 y.o. female on 22  Elements of this note, including Diagnosis,  Interval History, Past Medical/Surgical/Family/Social Histories, ROS, physical exam, and Assessment and Plan were copied and pasted from Previous. Updates have been made where noted and reflect current exam and medical decision making from the DOS of this encounter. CHIEF COMPLAINT     ID following for   Chief Complaint   Patient presents with    Fall     HISTORY OF PRESENT ILLNESS     Pt seen and examined  22  febrile this am   More awake has no c/o [place]  G daughter present had no questions or concnerns   Nurse present      Patient is tolerating medications. No reported adverse drug reactions. REVIEW OF SYSTEMS     As stated above in the chief complaint, otherwise negative. CURRENT MEDICATIONS      ferric gluconate (FERRLECIT) IVPB  125 mg IntraVENous Daily    fluconazole  200 mg Per G Tube Daily    metoprolol tartrate  50 mg Oral TID    sodium chloride flush  5-40 mL IntraVENous 2 times per day    pantoprazole  40 mg IntraVENous Daily    aspirin  81 mg Oral BID WC     Continuous Infusions:   sodium chloride       PRN Meds:polyethylene glycol, sodium chloride flush, sodium chloride, morphine **OR** morphine, oxyCODONE **OR** [DISCONTINUED] oxyCODONE    PHYSICAL EXAM     BP (!) 117/59   Pulse 96   Temp 98.6 °F (37 °C) (Temporal)   Resp 18   Ht 5' 2\" (1.575 m)   Wt 91 lb 3.2 oz (41.4 kg)   SpO2 96%   BMI 16.68 kg/m²   Temp  Av.1 °F (37.3 °C)  Min: 98.6 °F (37 °C)  Max: 99.5 °F (37.5 °C)  Constitutional:  The patient is  thin   Skin:    Warm and dry. HEENT:    AT/NC oral mucosa dry   Chest:    Symmetrical expansion. Dec bs bases  Cardiovascular:  S1 and S2 are rhythmic and regular.     Abdomen:   Positive bowel sounds to auscultation. Benign to palpation. Extremities:    Edema   CNS    Awake   Lines: piv  [place]    Dajuan king has new dessing       DIAGNOSTIC RESULTS   Radiology:    Recent Labs     07/22/22  1116 07/22/22  2257 07/23/22  0833   WBC  --   --  17.9*   RBC  --   --  2.68*   HGB 7.8* 7.3* 7.9*   HCT  --   --  26.5*   MCV  --   --  98.9   MCH  --   --  29.5   MCHC  --   --  29.8*   RDW  --   --  15.7*   PLT  --   --  371   MPV  --   --  10.4       Recent Labs     07/22/22  1747      K 5.1*   CL 98   CO2 31*   BUN 30*   CREATININE 0.7   GLUCOSE 131*   PROT 5.3*   LABALBU 2.2*   CALCIUM 8.5*   BILITOT 0.8   ALKPHOS 60   AST 20   ALT 12       No results found for: CRP  Lab Results   Component Value Date    SEDRATE 12 02/04/2013     Recent Labs     07/21/22  1344 07/22/22  1747   FERRITIN 411  --    AST  --  20   ALT  --  12            Microbiology:      FINAL IMPRESSION    Pt had   Chief Complaint   Patient presents with    Fall    Admitted for Closed fracture of right hip, initial encounter ([facility] [S72.001A]  Hip fracture requiring operative repair, left, open type I or II, initial encounter ([facility] [S72.002B]  Fall on same level from slipping, tripping or stumbling, initial encounter [W01. 0XXA]  Tra[facility]habdomyolysis, [facility] Brittany Snow. 6XXA]  On treatment for   Leukocytosis    Fevers   s/p left hip fracture  Procedure(s): 7/15  RIGHT INTROCHANTERIC CEPHALOMEDULLARY NAIL  INSERTION  UTI s/p ceftriaxone     BLOOD/URINE CX   VANCO/FLUCONAZOLE   CXRY noted follow and repeat cbc     Check labs    Monitor labs    Imaging and labs were reviewed per medical records.         Electronically signed by Marcelino Morton MD on 7/23/2022 at 10:53 AM

## 2022-07-23 NOTE — PROGRESS NOTES
Department of Internal Medicine  General Internal Medicine  Attending Progress Note  Chief Complaint   Patient presents with    Fall     SUBJECTIVE:    Reports that she is doing well. No fever and chills. Patient is minimally responsive.     OBJECTIVE      Medications    Current Facility-Administered Medications: ferric gluconate (FERRLECIT) 125 mg in sodium chloride 0.9 % 100 mL IVPB, 125 mg, IntraVENous, Daily  fluconazole (DIFLUCAN) 40 MG/ML suspension 200 mg, 200 mg, Per G Tube, Daily  metoprolol tartrate (LOPRESSOR) tablet 50 mg, 50 mg, Oral, TID  polyethylene glycol (GLYCOLAX) packet 17 g, 17 g, Per G Tube, Daily PRN  sodium chloride flush 0.9 % injection 5-40 mL, 5-40 mL, IntraVENous, 2 times per day  sodium chloride flush 0.9 % injection 5-40 mL, 5-40 mL, IntraVENous, PRN  0.9 % sodium chloride infusion, 25 mL, IntraVENous, PRN  pantoprazole (PROTONIX) injection 40 mg, 40 mg, IntraVENous, Daily  aspirin chewable tablet 81 mg, 81 mg, Oral, BID WC  morphine (PF) injection 2 mg, 2 mg, IntraVENous, Q2H PRN **OR** morphine injection 4 mg, 4 mg, IntraVENous, Q2H PRN  oxyCODONE (ROXICODONE) immediate release tablet 5 mg, 5 mg, Oral, Q4H PRN **OR** [DISCONTINUED] oxyCODONE (ROXICODONE) immediate release tablet 10 mg, 10 mg, Oral, Q4H PRN  Physical    VITALS:  BP (!) 117/59   Pulse 96   Temp 98.6 °F (37 °C) (Temporal)   Resp 18   Ht 5' 2\" (1.575 m)   Wt 91 lb 3.2 oz (41.4 kg)   SpO2 96%   BMI 16.68 kg/m²   CONSTITUTIONAL:  awake, alert, and cooperative  EYES:  extra-ocular muscles intact and vision intact  ENT:  normocepalic, without obvious abnormality  NECK:  supple, symmetrical, trachea midline  LUNGS:  no increased work of breathing, no retractions, and no crackles or wheezing  CARDIOVASCULAR:  normal apical pulses and normal S1 and S2  ABDOMEN:  normal bowel sounds, non-distended, and non-tender  MUSCULOSKELETAL:  there is no redness, warmth, or swelling of the joints  NEUROLOGIC:  Mental Status Exam: Level of Alertness:   awake  Orientation:   person,   SKIN:  no bruising or bleeding  Data    CBC:   Lab Results   Component Value Date/Time    WBC 17.9 2022 08:33 AM    RBC 2.68 2022 08:33 AM    HGB 7.9 2022 08:33 AM    HCT 26.5 2022 08:33 AM    MCV 98.9 2022 08:33 AM    MCH 29.5 2022 08:33 AM    MCHC 29.8 2022 08:33 AM    RDW 15.7 2022 08:33 AM     2022 08:33 AM    MPV 10.4 2022 08:33 AM     BMP:    Lab Results   Component Value Date/Time     2022 05:47 PM    K 5.1 2022 05:47 PM    CL 98 2022 05:47 PM    CO2 31 2022 05:47 PM    BUN 30 2022 05:47 PM    LABALBU 2.2 2022 05:47 PM    CREATININE 0.7 2022 05:47 PM    CALCIUM 8.5 2022 05:47 PM    GFRAA >60 2022 05:47 PM    LABGLOM >60 2022 05:47 PM    GLUCOSE 131 2022 05:47 PM       ASSESSMENT AND PLAN      1. Hip fracture requiring operative repair, Right, open type I or II, initial encounter:  S/P repair  PT/OT  Anticoagulation per needed    2. Severe protein-calorie malnutrition:    3. Anemia: may be due to blood loss  Continue to monitor and transfuse blood if needed    4. Dysphagia: on tube feed. Concern that patient may be having more dysphagia. 5.  Hypernatremia: resolved    6. Leucocytosis:  No source of infected. Review of chest X ray done on  concerning for pleural effusion. Unsure if this is pna. Infectious disease following. Defer management to them. 7.  Debility/Deconditionin.   Senile Dementia:

## 2022-07-23 NOTE — PROGRESS NOTES
PROGRESS NOTE  By Juliana Livingston M.D. [facility]  Dr. Iris Singh M.D.,  Dr. Aileen Garcia M.D.,   Dr. Jose Salcido D.O.,  Dr. Antoinette Betts M.D.,  Dr. Lore Castillo D.O.,  Maryam Cary D.O. Britany Moses  80 y.o.  female    SUBJECTIVE:  Remains somewhat confused but appears to be denying abdominal pain. Granddaughter at bedside    OBJECTIVE:    BP (!) 117/59   Pulse 96   Temp 98.6 °F (37 °C) (Temporal)   Resp 18   Ht 5' 2\" (1.575 m)   Wt 91 lb 3.2 oz (41.4 kg)   SpO2 96%   BMI 16.68 kg/m²     General: Elderly  female. NAD  HEENT: Anicteric sclera/moist oral mucosa  Neck: Supple with trachea midline  Chest: CTA B  Cor: Appears irregular  Abd.: Soft and nontender. PEG tube in place  Extr.:  Decreased muscle tone and bulk throughout  Skin: Warm and dry/anicteric      DATA:    Monitor data reviewed -atrial fibrillation noted.     Stool (measured) : 0 mL  Lab Results   Component Value Date/Time    WBC 17.9 07/23/2022 08:33 AM    RBC 2.68 07/23/2022 08:33 AM    HGB 7.9 07/23/2022 08:33 AM    HCT 26.5 07/23/2022 08:33 AM    MCV 98.9 07/23/2022 08:33 AM    MCH 29.5 07/23/2022 08:33 AM    MCHC 29.8 07/23/2022 08:33 AM    RDW 15.7 07/23/2022 08:33 AM     07/23/2022 08:33 AM    MPV 10.4 07/23/2022 08:33 AM     Lab Results   Component Value Date/Time     07/22/2022 05:47 PM    K 5.1 07/22/2022 05:47 PM    CL 98 07/22/2022 05:47 PM    CO2 31 07/22/2022 05:47 PM    BUN 30 07/22/2022 05:47 PM    CREATININE 0.7 07/22/2022 05:47 PM    CALCIUM 8.5 07/22/2022 05:47 PM    PROT 5.3 07/22/2022 05:47 PM    LABALBU 2.2 07/22/2022 05:47 PM    BILITOT 0.8 07/22/2022 05:47 PM    ALKPHOS 60 07/22/2022 05:47 PM    AST 20 07/22/2022 05:47 PM    ALT 12 07/22/2022 05:47 PM     No results found for: LIPASE  No results found for: AMYLASE      ASSESSMENT/PLAN:  Patient Active Problem List   Diagnosis    Degenerative arthropathy of spinal facet joint, Multilevel of the cervical spine    DDD (degenerative disc disease), cervicalC6-C7    Spondylosis, lumbosacral    Lumbar facet arthropathy    Lumbar spinal stenosis    Osteoarthrosis, hip    Knee pain    Facet syndrome right C2-C6     Degenerative osteoarthrosis right C2-C6    Protruded cervical disc    Cervical radiculopathy    Neural foraminal stenosis of lumbar spine    Neural foraminal stenosis of cervical spine    Osteoarthritis of lumbar spine, degenerative with facet syndrome    Hip fracture requiring operative repair, left, open type I or II, initial encounter ([facility] [address]    Severe protein-calorie malnutrition ([facility] [address]        1. Dysphagia/Aspiration   -S/P PEG 7/18  -Tube feedings per admitting service     2. Anemia   -New anemia first noted in July of this year  -Iron deficient/normocytic  -H&H appears stabilized   -No evidence of overt bleed  -Pending FOBT -still no bowel movement  -See discussion below regarding endoscopies    3. Constipation  -Laxatives/stool softeners via PEG tube     Above has been discussed in detail with patient's different granddaughter at bedside. I explained to the granddaughter downward trend of hemoglobin however no evidence of overt bleed. I have explained that further evaluation of her anemia may require colonoscopy and patient's granddaughter verbalized understanding. Margie Prince MD  7/23/2022  12:28 PM    NOTE:  This report was transcribed using voice recognition software. Every effort was made to ensure accuracy; however, inadvertent computerized transcription errors may be present.

## 2022-07-23 NOTE — PROGRESS NOTES
Cardiology  Progress Note      SUBJECTIVE: Somewhat more alert and appropriate today. Chest sounds less congested. Current Inpatient Medications  Current Facility-Administered Medications: ferric gluconate (FERRLECIT) 125 mg in sodium chloride 0.9 % 100 mL IVPB, 125 mg, IntraVENous, Daily  fluconazole (DIFLUCAN) 40 MG/ML suspension 200 mg, 200 mg, Per G Tube, Daily  metoprolol tartrate (LOPRESSOR) tablet 50 mg, 50 mg, Oral, TID  polyethylene glycol (GLYCOLAX) packet 17 g, 17 g, Per G Tube, Daily PRN  sodium chloride flush 0.9 % injection 5-40 mL, 5-40 mL, IntraVENous, 2 times per day  sodium chloride flush 0.9 % injection 5-40 mL, 5-40 mL, IntraVENous, PRN  0.9 % sodium chloride infusion, 25 mL, IntraVENous, PRN  pantoprazole (PROTONIX) injection 40 mg, 40 mg, IntraVENous, Daily  aspirin chewable tablet 81 mg, 81 mg, Oral, BID WC  morphine (PF) injection 2 mg, 2 mg, IntraVENous, Q2H PRN **OR** morphine injection 4 mg, 4 mg, IntraVENous, Q2H PRN  oxyCODONE (ROXICODONE) immediate release tablet 5 mg, 5 mg, Oral, Q4H PRN **OR** [DISCONTINUED] oxyCODONE (ROXICODONE) immediate release tablet 10 mg, 10 mg, Oral, Q4H PRN      Physical  VITALS:  BP (!) 117/59   Pulse 96   Temp 98.6 °F (37 °C) (Temporal)   Resp 18   Ht 5' 2\" (1.575 m)   Wt 91 lb 3.2 oz (41.4 kg)   SpO2 96%   BMI 16.68 kg/m²   CURRENT TEMPERATURE:  Temp: 98.6 °F (37 °C)  CONSTITUTIONAL: No acute distress. EYES: Vision is intact. ENT: No sore throat. No ear drainage. NECK: No JVD. BACK: Symmetric. LUNGS:  diminished breath sounds right base and left base  CARDIOVASCULAR:  normal S1 and S2, no S3, and no S4  ABDOMEN:  non-tender. PEG tube in place  NEUROLOGIC: No focal deficits. EXTREMITIES: No edema cyanosis or clubbing.     DATA:      ECG:  I have reviewed EKG with the following interpretation:  normal sinus rhythm with frequent PACs    Cardiology Labs:  BMP:    Lab Results   Component Value Date/Time     07/22/2022 05:47 PM    K 5.1 07/22/2022 05:47 PM    CL 98 07/22/2022 05:47 PM    CO2 31 07/22/2022 05:47 PM    BUN 30 07/22/2022 05:47 PM     CBC:    Lab Results   Component Value Date/Time    WBC 17.9 07/23/2022 08:33 AM    RBC 2.68 07/23/2022 08:33 AM    HGB 7.9 07/23/2022 08:33 AM    HCT 26.5 07/23/2022 08:33 AM    MCV 98.9 07/23/2022 08:33 AM    RDW 15.7 07/23/2022 08:33 AM     07/23/2022 08:33 AM     PT/INR:  No results found for: PTINR  TROPONIN:  No components found for: TROP    ASSESSMENT    1.right hip fracture. Patient underwent surgery and she tolerated that fairly well from cardiac standpoint. 2.sinus tachycardia with PACs. On metoprolol. Patient was still having runs of atrial tachycardia on the monitor today. Dose of metoprolol was increased further to 50 mg 3 times a day and atrial arrhythmias are much better now    3.aspiration problem. Status post PEG tube. PLAN  As per orders.

## 2022-07-23 NOTE — PROGRESS NOTES
Physical Therapy    Physical Therapy Treatment Note/Plan of Care    Room #:  1682/9606-31  Patient Name: Juan Pablo Garcia  YOB: 1928  MRN: [id number]    Date of Service: 7/23/2022     Tentative placement recommendation: subacute rehab   Equipment recommendation: To be determined      Evaluating Physical Therapist: Jenifer Neri  #[id number]     Specific Provider Orders/Date/Referring Provider :  07/16/22 0900    PT eval and treat  Start:  07/16/22 0900,   End:  07/16/22 0900,   ONE TIME,   Standing Count:  1 Occurrences,   Chandana Ponce MD     Admitting Diagnosis:   Closed fracture of right hip, initial encounter [facility]) [S72.001A]  Hip fracture requiring operative repair, left, open type I or II, initial encounter ([facility] [address] [S72.002B]  Fall on same level from slipping, tripping or stumbling, initial encounter [W01. 0XXA]  Traumatic rhabdomyolysis, initial encounter ([facility] [address] Mia Alcala. 6XXA]   Visit diagnosis:   Visit Diagnoses         Codes    Closed fracture of right hip, initial encounter [facility])    -  Primary S72.001A    Fall on same level from slipping, tripping or stumbling, initial encounter     W01. 0XXA    Traumatic rhabdomyolysis, initial encounter ([facility] [address]     T79. 6XXA    Closed fracture of left hip, initial encounter [facility])     S72.002A        Admitted with  fall, unknown time on floor 7/13    Surgery:     right   INTROCHANTERIC CEPHALOMEDULLARY NAIL  INSERTION    Patient Active Problem List   Diagnosis    Degenerative arthropathy of spinal facet joint, Multilevel of the cervical spine    DDD (degenerative disc disease), cervicalC6-C7    Spondylosis, lumbosacral    Lumbar facet arthropathy    Lumbar spinal stenosis    Osteoarthrosis, hip    Knee pain    Facet syndrome right C2-C6     Degenerative osteoarthrosis right C2-C6    Protruded cervical disc    Cervical radiculopathy    Neural foraminal stenosis of lumbar spine    Neural foraminal stenosis of cervical spine    Osteoarthritis of lumbar spine, degenerative with facet syndrome    Hip fracture requiring operative repair, left, open type I or II, initial encounter ([facility] [address]    Severe protein-calorie malnutrition ([facility] [address]       ASSESSMENT of current deficits: Patient exhibits decreased strength, balance, endurance, range of motion, coordination, and pain    impairing functional mobility, transfers, gait , gait distance, and tolerance to activity are barriers to d/c and require skilled intervention during hospital stay to attain pre hospital level of function. Patient oriented x 1, confused throughout tx session, having difficulty with following cueing. Patient requires assist for supine exercises and Max assist to edge of bed and back to bed. Patient with poor tolerance to sitting upright ;  only 2 minutes with hard left lateral lean to off weight R hip and requires Max assist for support on edge of bed. Patient would benefit from continued therapy for above deficits to decrease all risk. PHYSICAL THERAPY  PLAN OF CARE       Physical therapy plan of care is established based on physician order,  patient diagnosis and clinical assessment    Current Treatment Recommendations:    -Bed Mobility: Lower extremity exercises , Upper extremity exercises , and Trunk control activities   -Sitting Balance: Incorporate reaching activities to activate trunk muscles , Hands on support to maintain midline , and Facilitate postural control in all planes   -Standing Balance: Perform strengthening exercises in standing to promote motor control with or without upper extremity support  and Instruct patient on adequate base of support to maintain balance  -Transfers: Provide instruction on proper hand and foot position for adequate transfer of weight onto lower extremities and use of gait device if needed, Cues for hand placement, technique and safety.  Provide stabilization to prevent fall , and Facilitate weight shift forward on to lower extremities and provide necessary stabilization of bilateral lower extremities   -Gait: Gait training, Standing activities to improve: base of support, weight shift, weight bearing , and Exercises to improve hip and knee control   -Endurance: Utilize Supervised activities to increase level of endurance to allow for safe functional mobility including transfers and gait     PT long term treatment goals are located in below grid    Patient and or family understand(s) diagnosis, prognosis, and plan of care. Frequency of treatments: Patient will be seen  twice daily  for therapeutic exercise, functional retraining, endurance activities, balance exercises, family and patient education.        Prior Level of Function: Patient ambulated independently and climbed steps  Rehab Potential: good   for baseline    Past medical history:   Past Medical History:   Diagnosis Date    Arthritis     Osteoporosis     Paget's disease      Past Surgical History:   Procedure Laterality Date    COLON SURGERY      Diverticuli    FEMUR FRACTURE SURGERY Left 7/15/2022    RIGHT INTROCHANTERIC CEPHALOMEDULLARY NAIL  INSERTION performed by Mireille Montalvo DO at [address] [facility]      left 2010    KNEE SURGERY      Left x 2    LUNG BIOPSY      NERVE BLOCK  11-    NERVE BLOCK  11 16 2011    right cervical paravertebral facet #2    NERVE BLOCK  11 30 2011    NERVE BLOCK  12 28 2012    cervical epidural #1    NERVE BLOCK      1-9-2013    NERVE BLOCK  1 21 13    cerv ep #3    NERVE BLOCK Right 06 12 2013    cervical paravertebral facet #1    NERVE BLOCK Right 6 24 13    cerv facet #2    NERVE BLOCK Right 07 24 2013    cervical paravertebral facet #3    NERVE BLOCK Right 1 15 14    cerv transforam #1    NERVE BLOCK Right 2/19/2014    right cervical transforaminal nerve block  #2    NERVE BLOCK  03/05/14    transforaminal nerve block right cervical #3    NERVE BLOCK Bilateral 10/29/2014    himanshu lumbar paravertebral facet  #1    NERVE BLOCK Bilateral 11/5/2014 bilateral paravertebral facet  lumbar  #2    NERVE BLOCK Bilateral 11/12/14    paravertebral facet lumbar #3    NERVE BLOCK Right 05/22/2017    transforaminal cervical #1    NERVE BLOCK Right 06/05/2017    transforaminal #2    NERVE BLOCK Bilateral 06/12/2017    lumbar paravertebral facet #1    NERVE BLOCK Bilateral 06/21/2017    Bilatera lumbar paravertebral facet block 32 l3-4 l4-5 l5-s1    NERVE BLOCK Bilateral 09/09/2020    lumbar facet block    NERVE BLOCK Bilateral 9/9/2020    BILATERAL LUMBAR FACET BLOCKS AT L3-4,L4-5,L5-S1 x2 UNDER X-RAY #1 performed by Agnieszka Hoang DO at [address] [facility] [facility] [address] Bilateral 09/16/2020    lumbar facet paravertebral    NERVE BLOCK Bilateral 9/16/2020    BILATERAL LUMBAR FACET BLOCKS AT L3-4,L4-5,L5-S1 x2 UNDER X-RAY #2 performed by Agnieszka Hoang DO at [address] Right 09 20 2013    radiofrequency neurolysis medial branch nerve cervical    TOTAL HIP ARTHROPLASTY      Left    UPPER GASTROINTESTINAL ENDOSCOPY N/A 7/18/2022    EGD ESOPHAGOGASTRODUODENOSCOPY PEG TUBE INSERTION performed by Edmundo Damico MD at [facility]:    Precautions:  Continuous Pulse Oximetry , falls, alarm, and O2 ,right lower extremity FWB (full weight bearing)     Social history: Patient lives with daughter in a  3 story home in [facility] on 3rd level   with  multiple steps   to enter with [address]: Jimmie Tsang,       [address] [place] [address]   How much difficulty turning over in bed?: Unable  How much difficulty sitting down on / standing up from a chair with arms?: Unable  How much difficulty moving from lying on back to sitting on side of bed?: Unable  How much help from another person moving to and from a bed to a chair?: Total  How much help from another person needed to walk in hospital room?: Total  How much help from another person for climbing 3-5 steps with a railing?: Total  AM-PAC Inpatient Mobility Raw Score : 6  AM-PAC Inpatient T-Scale Score : 23.55  Mobility Inpatient CMS 0-100% Score: 100  Mobility Inpatient CMS G-Code Modifier : CN    Nursing cleared patient for PT treatment. Nursing reports patient was recently medicated for pain. OBJECTIVE:   Initial Evaluation  Date: 7/17/2022 Treatment Date:  7/23/2022   Short Term/ Long Term   Goals   Was pt agreeable to Eval/treatment? Yes yes    Pain Level  Unrated however with movement of  Right hip grimace and vocalized pain No number assigned to pain     Bed Mobility  Rolling: Maximal assist of 1    Supine to sit: Maximal assist of 1    Sit to supine: Maximal assist of 1    Scooting: Maximal assist of 1   Rolling: Maximal assist of 1   Supine to sit: Not assessed  use of TAPS  Sit to supine: Maximal assist of 1   Scooting: Maximal assist of 1  Dependent x2 to head of bed    Rolling: Independent    Supine to sit: Independent    Sit to supine: Independent    Scooting: Independent     Transfers Sit to stand: Maximal assist of 1 anterior approach x 2 reps; attempted with walker however patient fearful; patient able to bear wt bilateral however 2 person assist for wheeled walker trf will be necessary next visit Sit to stand: Not assessed       Sit to stand: Modified Independent     Ambulation    not assessed  Not assessed   100 feet using  wheeled walker with Modified Independent    ROM Within functional limits except Right hip 50%, pt has significant internal rotation with inability to tolerate > neutral    Increase range of motion 10% of affected joints    Strength Within functional limits  except  Right lower extremity 2-/5    Within functional limits   Balance Sitting EOB:  poor   Dynamic Standing:  poor    Sitting EOB: poor hard left lateral lean, requires Max assist for upright.    Dynamic Standing: not assessed     Sitting EOB:  good    Dynamic Standing:  good wheeled walker      Patient is Alert & Oriented x person and follows one step directions  hard of hearing   Sensation:  Patient denies numbness/tingling  Edema:  yes right lower extremity   Vitals: 1 Liters of o2 via nasal cannula   Blood Pressure at rest   Blood Pressure during session     Heart Rate at rest   Heart Rate during session     SPO2 at rest 91% SPO2 during session %     Patient education   Patient educated on role of Physical Therapy, risks of immobility, safety and plan of care,  importance of mobility while in hospital , ankle pumps, quad set and glut set for edema control, blood clot prevention, safety , and positioning for skin integrity and comfort      Patient response to education:   Pt verbalized understanding Pt demonstrated skill Pt requires further education in this area   Yes Partial Yes       Treatment:  Patient practiced and was instructed in the following treatment:     Therapist educated and facilitated patient on techniques to increase safety and independence with bed mobility, balance, functional transfers, and functional mobility. Patient assisted with supine exercises in bed. Nursing present during treatment. Per nurse pt was having a lot of pain, meds were given approx 30 min earlier. Pt was lethargic and could not respond to questions. Per nursing pt has been sleeping. Assisted nursing with rolling patient multiple times for depends change, changing bed linens, and chux pads. Patient positioned for comfort with pillow under R LE and blanket rolled up to avoid IR. Foam heel floaters donned. ROM exs were performed on both LE's. Granddaughter was present at end of tx. At end of session, patient in bed with alarm call light and phone within reach,   all lines and tubes intact, nursing notified. Patient would benefit from continued skilled Physical Therapy to improve functional independence and quality of life. Patient's/ family goals   rehab      Patient and or family understand(s) diagnosis, prognosis, and plan of care.        Frequency of treatments: Patient will be seen  twice daily  for therapeutic exercise, functional retraining, endurance activities, balance exercises, family and patient education.      Time in 0851  Time out 0916    Total Treatment Time  25 minutes    CPT codes:  Therapeutic activities (84840)   17 minutes  1 unit(s)  Therapeutic exercises (34669)   12 minutes  1 unit(s)  Dante President, PTA

## 2022-07-24 PROBLEM — J69.0 ASPIRATION PNEUMONIA (HCC): Status: ACTIVE | Noted: 2022-01-01

## 2022-07-24 PROBLEM — U07.1 ACUTE HYPOXEMIC RESPIRATORY FAILURE DUE TO COVID-19 (HCC): Status: ACTIVE | Noted: 2022-01-01

## 2022-07-24 PROBLEM — G93.40 ACUTE ENCEPHALOPATHY: Status: ACTIVE | Noted: 2022-01-01

## 2022-07-24 PROBLEM — J96.01 ACUTE HYPOXEMIC RESPIRATORY FAILURE DUE TO COVID-19 (HCC): Status: ACTIVE | Noted: 2022-01-01

## 2022-07-24 NOTE — PROGRESS NOTES
[address] [place] [address] Hospitalist   Progress Note    Admitting Date and Time: 7/13/2022  6:59 PM  Admit Dx: Closed fracture of right hip, initial encounter [facility]) [S72.001A]  Hip fracture requiring operative repair, left, open type I or II, initial encounter ([facility] [S72.002B]  Fall on same level from slipping, tripping or stumbling, initial encounter [W01. 0XXA]  Traumatic rhabdomyolysis, initial encounter ([facility] Mia Alcala. 6XXA]    Subjective/interval history:    Pt does not respond much today, very somnolent. Per RN she received 1 dose of pain medication this morning, but did not have signs of sedation for most of the day. She had to be frequently cleaned last night due to bowel movements after a dose of magnesium citrate for constipation. Labs this morning distant with developing prerenal azotemia despite free water flushes via PEG. This afternoon, she became tachycardic and spiked fever. Started IV fluids and obtain blood cultures. She is rhonchorous on exam, and there is concern for aspiration of tube feeds. Tube feeds discontinued and started on IV Unasyn. ABG was obtained and shows significant hypoxemia as well as respiratory acidosis. Given her fever, tachycardia, and new significant hypoxemia she was retested for COVID-19 and this was found to be positive.     ROS: Unable to obtain review of systems due to encephalopathy     ampicillin-sulbactam  3,000 mg IntraVENous Q6H    dexamethasone  6 mg IntraVENous Q24H    polyethylene glycol  17 g Per G Tube BID    docusate  100 mg Per G Tube BID    fluconazole  200 mg Per G Tube Daily    metoprolol tartrate  50 mg Oral TID    sodium chloride flush  5-40 mL IntraVENous 2 times per day    pantoprazole  40 mg IntraVENous Daily    aspirin  81 mg Oral BID WC     bisacodyl, 10 mg, Daily PRN  sodium chloride flush, 5-40 mL, PRN  sodium chloride, 25 mL, PRN  morphine, 2 mg, Q2H PRN   Or  morphine, 4 mg, Q2H PRN  oxyCODONE, 5 mg, Q4H PRN       Objective:    BP (!) 144/54   Pulse (!) 112   Temp (!) 102.4 °F (39.1 °C) (Axillary)   Resp (!) 41   Ht 5' 2\" (1.575 m)   Wt 91 lb 3.2 oz (41.4 kg)   SpO2 91%   BMI 16.68 kg/m²   General Appearance: Somnolent will open eyes to voice but does not interact  Skin: warm and dry, poor turgor  Head: normocephalic and atraumatic  Eyes: pupils equal, round, and reactive to light, extraocular eye movements intact, conjunctivae normal  Neck: neck supple and non tender without mass   ENT: Oral mucosa very dry  Pulmonary/Chest: Nonlabored on 2 L nasal cannula. Mild bilateral rhonchi  Cardiovascular: Tachycardic, regular rhythm normal S1 and S2 and no carotid bruits  Abdomen: PEG tube in place. Soft, non-distended, normal bowel sounds, no masses or organomegaly  Extremities: no cyanosis, no clubbing and no edema  Neurologic: Somnolent, opens eyes but does not interact or follow commands      Recent Labs     07/22/22 1747 07/24/22  0834    138   K 5.1* 4.9   CL 98 99   CO2 31* 34*   BUN 30* 48*   CREATININE 0.7 0.7   GLUCOSE 131* 141*   CALCIUM 8.5* 9.0       Recent Labs     07/22/22  1747 07/24/22  0834   ALKPHOS 60 90   PROT 5.3* 6.0*   LABALBU 2.2* 2.6*   BILITOT 0.8 0.7   AST 20 26   ALT 12 14       Recent Labs     07/23/22  0833 07/23/22  1225 07/23/22  2256 07/24/22  0834 07/24/22  1207   WBC 17.9*  --   --  21.6*  --    RBC 2.68*  --   --  2.75*  --    HGB 7.9*   < > 8.0* 8.1* 8.3*   HCT 26.5*  --   --  27.6*  --    MCV 98.9  --   --  100.4*  --    MCH 29.5  --   --  29.5  --    MCHC 29.8*  --   --  29.3*  --    RDW 15.7*  --   --  15.7*  --      --   --  461*  --    MPV 10.4  --   --  10.3  --     < > = values in this interval not displayed. Radiology:   XR CHEST PORTABLE   Final Result   Increasing airspace disease and volume loss in left hemithorax suspicious at   least partial left lower lobe collapse with mucous plugging. Pulmonary consultation is recommended.          XR CHEST PORTABLE   Final Result Increasing bilateral airspace disease with bilateral pleural effusions and   moderate cardiomegaly. XR HIP RIGHT (2-3 VIEWS)   Final Result   It intact right femoral gamma nail with near anatomic alignment of femoral   fracture fragments and severe right hip osteoarthritis. CT HEAD WO CONTRAST   Final Result   No acute intracranial abnormality. Chronic microvascular ischemic changes. Small old lacunar infarcts. FL MODIFIED BARIUM SWALLOW W VIDEO   Final Result   Swallowing dysfunction as described above including aspiration of thin liquid   barium and barium nectar. Please see separate speech pathology report for full discussion of findings   and recommendations. Fluoro For Surgical Procedures   Final Result   Intraprocedural fluoroscopic spot images as above. See separate procedure   report for more information. XR CHEST PORTABLE   Final Result   Bilateral pulmonary opacities, left greater than right, likely   infectious/inflammatory. Aspiration pneumonitis would also be in the   differential diagnosis. XR HIP RIGHT (1 VIEW)   Final Result      Intertrochanteric fracture of the proximal right femur. XR FEMUR RIGHT (MIN 2 VIEWS)   Final Result   Intertrochanteric fracture of the proximal right femur         XR HIP RIGHT (2-3 VIEWS)   Final Result   Displaced right intertrochanteric fracture with varus deformity. .         XR CHEST 1 VIEW   Final Result   No acute process. CT HEAD WO CONTRAST   Final Result   No acute intracranial abnormality. CT CERVICAL SPINE WO CONTRAST   Final Result   No acute abnormality of the cervical spine. Multilevel degenerative changes.              Assessment and Plan:  Principal Problem:    Hip fracture requiring operative repair, left, open type I or II, initial encounter [facility])  Active Problems:    Severe protein-calorie malnutrition ([address]    Acute encephalopathy    Acute hypoxemic respiratory effort was made to ensure accuracy; however, inadvertent computerized transcription errors may be present.      Electronically signed by Billey Kanner, DO on 7/24/2022 at 6:36 PM

## 2022-07-24 NOTE — PROGRESS NOTES
Called and updated Dr. Nely Sue on ABG results, patients nurse had previously updated Dr. Nely Sue on vitals and patient condition. Dr. Nely Sue in to room to see patient.

## 2022-07-24 NOTE — PROGRESS NOTES
PROGRESS NOTE  By Syed Cantrell M.D. [facility]  Dr. Yoselin Martin M.D.,  Dr. Jefferson Ashby M.D.,   DEEPTHI AngelO.,  Dr. Isha Borrego M.D.,  Dr. Lexis Lu D.O.,  Wendi Ring D.O. Tessie Sharma  80 y.o.  female    SUBJECTIVE:  Virtually nonverbal.  Daughter/granddaughter at bedside    OBJECTIVE:    BP (!) 101/53   Pulse 92   Temp 98.1 °F (36.7 °C) (Oral)   Resp 18   Ht 5' 2\" (1.575 m)   Wt 91 lb 3.2 oz (41.4 kg)   SpO2 92%   BMI 16.68 kg/m²     General: Elderly  female. Somnolent/obtunded  HEENT: Anicteric sclera/moist oral mucosa  Neck: Supple/trachea midline  Chest: Symmetric excursion/no wheezing  Cor: Regular  Abd.: Soft and appears nontender and nondistended. PEG tube in place  Extr.:  Decreased muscle tone and bulk throughout  Skin: Warm and dry/anicteric        DATA:    Monitor data reviewed -sinus rhythm with PACs noted.     Stool (measured) : 0 mL  Lab Results   Component Value Date/Time    WBC 21.6 07/24/2022 08:34 AM    RBC 2.75 07/24/2022 08:34 AM    HGB 8.1 07/24/2022 08:34 AM    HCT 27.6 07/24/2022 08:34 AM    .4 07/24/2022 08:34 AM    MCH 29.5 07/24/2022 08:34 AM    MCHC 29.3 07/24/2022 08:34 AM    RDW 15.7 07/24/2022 08:34 AM     07/24/2022 08:34 AM    MPV 10.3 07/24/2022 08:34 AM     Lab Results   Component Value Date/Time     07/24/2022 08:34 AM    K 4.9 07/24/2022 08:34 AM    CL 99 07/24/2022 08:34 AM    CO2 34 07/24/2022 08:34 AM    BUN 48 07/24/2022 08:34 AM    CREATININE 0.7 07/24/2022 08:34 AM    CALCIUM 9.0 07/24/2022 08:34 AM    PROT 6.0 07/24/2022 08:34 AM    LABALBU 2.6 07/24/2022 08:34 AM    BILITOT 0.7 07/24/2022 08:34 AM    ALKPHOS 90 07/24/2022 08:34 AM    AST 26 07/24/2022 08:34 AM    ALT 14 07/24/2022 08:34 AM     No results found for: LIPASE  No results found for: AMYLASE      ASSESSMENT/PLAN:  Patient Active Problem List   Diagnosis    Degenerative arthropathy of spinal facet joint, Multilevel of the cervical spine    DDD (degenerative disc disease), cervicalC6-C7    Spondylosis, lumbosacral    Lumbar facet arthropathy    Lumbar spinal stenosis    Osteoarthrosis, hip    Knee pain    Facet syndrome right C2-C6     Degenerative osteoarthrosis right C2-C6    Protruded cervical disc    Cervical radiculopathy    Neural foraminal stenosis of lumbar spine    Neural foraminal stenosis of cervical spine    Osteoarthritis of lumbar spine, degenerative with facet syndrome    Hip fracture requiring operative repair, left, open type I or II, initial encounter ([facility]    Severe protein-calorie malnutrition ([facility]     1. Dysphagia/Aspiration   -S/P PEG 7/18  -Tube feedings per admitting service     2. Anemia   -New anemia first noted in July of this year  -Iron deficient/normocytic  -H&H appears stabilized   -No evidence of overt bleed  -Pending FOBT   -See discussion below regarding endoscopies     3. Constipation  -Family reports bowel movement  -Laxatives/stool softeners via PEG tube     Above has been discussed in detail with patient's daughter at bedside today. I have explained previous issues with downtrending hemoglobin which is now stabilized. I have explained that patient will be at particular disadvantage attempting to prep given recent orthopedic issues and her mental status/overall functional condition. I have explained however that if further evaluation for patient's anemia is desired especially if patient exhibits further decrease in H&H or evidence of overt bleed, colonoscopy should be considered. I explained that at this time we will monitor patient H&H and await FOBT prior to deciding whether to attempt colonoscopy. However if patient H&H remained stable she can be discharged from GI. To follow as outpatient for above-described issues. Patient's daughter verbalized understanding and agreement with the plan as delineated.        Mike Lee MD  7/24/2022  11:06 AM    NOTE:  This report was transcribed using voice recognition software. Every effort was made to ensure accuracy; however, inadvertent computerized transcription errors may be present.

## 2022-07-24 NOTE — PROGRESS NOTES
Cardiology  Progress Note      SUBJECTIVE:  sluggish responses to verbal stimulus. Open her eyes and responds to verbal questions. She looks very weak. Current Inpatient Medications  Current Facility-Administered Medications: polyethylene glycol (GLYCOLAX) packet 17 g, 17 g, Per G Tube, BID  bisacodyl (DULCOLAX) suppository 10 mg, 10 mg, Rectal, Daily PRN  docusate (COLACE) 50 MG/5ML liquid 100 mg, 100 mg, Per G Tube, BID  ferric gluconate (FERRLECIT) 125 mg in sodium chloride 0.9 % 100 mL IVPB, 125 mg, IntraVENous, Daily  fluconazole (DIFLUCAN) 40 MG/ML suspension 200 mg, 200 mg, Per G Tube, Daily  metoprolol tartrate (LOPRESSOR) tablet 50 mg, 50 mg, Oral, TID  sodium chloride flush 0.9 % injection 5-40 mL, 5-40 mL, IntraVENous, 2 times per day  sodium chloride flush 0.9 % injection 5-40 mL, 5-40 mL, IntraVENous, PRN  0.9 % sodium chloride infusion, 25 mL, IntraVENous, PRN  pantoprazole (PROTONIX) injection 40 mg, 40 mg, IntraVENous, Daily  aspirin chewable tablet 81 mg, 81 mg, Oral, BID WC  morphine (PF) injection 2 mg, 2 mg, IntraVENous, Q2H PRN **OR** morphine injection 4 mg, 4 mg, IntraVENous, Q2H PRN  oxyCODONE (ROXICODONE) immediate release tablet 5 mg, 5 mg, Oral, Q4H PRN **OR** [DISCONTINUED] oxyCODONE (ROXICODONE) immediate release tablet 10 mg, 10 mg, Oral, Q4H PRN      Physical  VITALS:  BP (!) 101/53   Pulse 92   Temp 98.1 °F (36.7 °C) (Oral)   Resp 18   Ht 5' 2\" (1.575 m)   Wt 91 lb 3.2 oz (41.4 kg)   SpO2 92%   BMI 16.68 kg/m²   CURRENT TEMPERATURE:  Temp: 98.1 °F (36.7 °C)  CONSTITUTIONAL: No acute distress. EYES: Vision is intact. ENT: No sore throat. No ear drainage. NECK: No JVD. BACK: Symmetric. LUNGS:  rhonchi right base, diminished breath sounds right base and left base  CARDIOVASCULAR:  normal S1 and S2, no S3, and murmurs include systolic murmur I/VI located at left sternal border without radiation  ABDOMEN:  non-distended  NEUROLOGIC: No focal deficits.   Sluggish responses to verbal stimuli. EXTREMITIES: No edema cyanosis or clubbing. DATA:        Cardiology Labs:  BMP:    Lab Results   Component Value Date/Time     07/24/2022 08:34 AM    K 4.9 07/24/2022 08:34 AM    CL 99 07/24/2022 08:34 AM    CO2 34 07/24/2022 08:34 AM    BUN 48 07/24/2022 08:34 AM     CBC:    Lab Results   Component Value Date/Time    WBC 21.6 07/24/2022 08:34 AM    RBC 2.75 07/24/2022 08:34 AM    HGB 8.1 07/24/2022 08:34 AM    HCT 27.6 07/24/2022 08:34 AM    .4 07/24/2022 08:34 AM    RDW 15.7 07/24/2022 08:34 AM     07/24/2022 08:34 AM     PT/INR:  No results found for: PTINR  TROPONIN:  No components found for: TROP    ASSESSMENT    1.right hip fracture. Patient underwent surgery and she tolerated that fairly well from cardiac standpoint. 2.sinus tachycardia with PACs. On metoprolol. Dose of metoprolol was increased further to 50 mg 3 times a day and atrial arrhythmias are much better now    3.aspiration problem. Status post PEG tube. PLAN  As per orders.

## 2022-07-24 NOTE — PROGRESS NOTES
Received an order from [facility]  for fluconazole IV 200mg due to patients NPO status.      Electronically signed by Luis Fajardo RN on 7/24/22 at 7:53 PM EDT

## 2022-07-24 NOTE — PROGRESS NOTES
[address] [facility]  [facility]  Progress Note    NAME: Evelyn Kwong  MR:  [id number]  :   10/17/1928  DATE OF SERVICE:22    This is a face to face encounter with Evelyn Kwong 80 y.o. female on 22  Elements of this note, including Diagnosis,  Interval History, Past Medical/Surgical/Family/Social Histories, ROS, physical exam, and Assessment and Plan were copied and pasted from Previous. Updates have been made where noted and reflect current exam and medical decision making from the DOS of this encounter. CHIEF COMPLAINT     ID following for   Chief Complaint   Patient presents with    Fall     HISTORY OF PRESENT ILLNESS     Pt seen and examined  22  HAD BM LOSE   BEING CLEANED UP     afebrile this ON 2l         Nurse present      Patient is tolerating medications. No reported adverse drug reactions. REVIEW OF SYSTEMS     As stated above in the chief complaint, otherwise negative. CURRENT MEDICATIONS      polyethylene glycol  17 g Per G Tube BID    docusate  100 mg Per G Tube BID    ferric gluconate (FERRLECIT) IVPB  125 mg IntraVENous Daily    fluconazole  200 mg Per G Tube Daily    metoprolol tartrate  50 mg Oral TID    sodium chloride flush  5-40 mL IntraVENous 2 times per day    pantoprazole  40 mg IntraVENous Daily    aspirin  81 mg Oral BID WC     Continuous Infusions:   sodium chloride       PRN Meds:bisacodyl, sodium chloride flush, sodium chloride, morphine **OR** morphine, oxyCODONE **OR** [DISCONTINUED] oxyCODONE    PHYSICAL EXAM     BP (!) 101/53   Pulse 92   Temp 98.1 °F (36.7 °C) (Oral)   Resp 18   Ht 5' 2\" (1.575 m)   Wt 91 lb 3.2 oz (41.4 kg)   SpO2 92%   BMI 16.68 kg/m²   Temp  Av °F (36.7 °C)  Min: 97.9 °F (36.6 °C)  Max: 98.1 °F (36.7 °C)  Constitutional:  The patient is  thin   Skin:    Warm and dry. HEENT:    AT/NC oral mucosa dry   Chest:    Symmetrical expansion.  Dec bs bases  Cardiovascular:  S1 and S2 are rhythmic and regular. Abdomen:   Positive bowel sounds to auscultation. Benign to palpation. Extremities:    Edema HEMATOMA RIGH THIGH  CNS    Awake   Lines: piv  [place]    RighT hip has dessed      DIAGNOSTIC RESULTS   Radiology:    Recent Labs     07/23/22  0833 07/23/22  1225 07/23/22  2256 07/24/22  0834   WBC 17.9*  --   --  21.6*   RBC 2.68*  --   --  2.75*   HGB 7.9* 7.5* 8.0* 8.1*   HCT 26.5*  --   --  27.6*   MCV 98.9  --   --  100.4*   MCH 29.5  --   --  29.5   MCHC 29.8*  --   --  29.3*   RDW 15.7*  --   --  15.7*     --   --  461*   MPV 10.4  --   --  10.3       Recent Labs     07/22/22  1747 07/24/22  0834    138   K 5.1* 4.9   CL 98 99   CO2 31* 34*   BUN 30* 48*   CREATININE 0.7 0.7   GLUCOSE 131* 141*   PROT 5.3* 6.0*   LABALBU 2.2* 2.6*   CALCIUM 8.5* 9.0   BILITOT 0.8 0.7   ALKPHOS 60 90   AST 20 26   ALT 12 14       No results found for: CRP  Lab Results   Component Value Date    SEDRATE 12 02/04/2013     Recent Labs     07/21/22  1344 07/22/22  1747 07/24/22  0834   PROCAL  --  0.92* 0.63*   FERRITIN 411  --   --    AST  --  20 26   ALT  --  12 14            Microbiology:      FINAL IMPRESSION    Pt had   Chief Complaint   Patient presents with    Fall    Admitted for [facility] of right [facility] [S72.001A]  Hip fracture requiring operative repair, left, open type I or II, initial encounter [facility] [S72.002B]  Fall on same level from slipping, tripping or stumbling, initial encounter [W01. 0XXA]  Traumatic rhabdomyolysis, initial encounter ([facility] Lesley Christensen. 6XXA]  On treatment for   Leukocytosis  going up has rigth thigh hematoma   Fevers better   s/p left hip fracture  Procedure(s): 7/15  RIGHT INTROCHANTERIC CEPHALOMEDULLARY NAIL  INSERTION  Rigth thigh hematoma   UTI s/p ceftriaxone       7/21 BLOOD/URINE CX ngtd  VANCO/FLUCONAZOLE   CXRY noted follow and repeat cbc     Covid screen neg    Monitor labs cbc    Imaging and labs were reviewed per medical records. Electronically signed by Veronique House MD on 7/24/2022 at 11:26 AM

## 2022-07-25 NOTE — PLAN OF CARE
Patient not seen and examined in face-to-face encounter secondary to COVID-19 positive status in an effort to limit transmission/exposure and to preserve PPE. Pertinent notes/labs reviewed. FOBT still not reported -reorder  Noted is decreased H&H. Patient has been discussed with her daughter at bedside yesterday and at that time it was decided to wait for FOBT and to monitor H&H dynamics prior to deciding whether patient will require inpatient colonoscopy. At this time FOBT still pending but patient H&H has decreased though without evidence of overt bleed. Patient also in the meanwhile was found to be positive for COVID-19 infection. Continue to monitor H&H and await FOBT as above-mentioned to be reordered. Please, call with questions/concerns.   Thank you    Nixon Weston MD

## 2022-07-25 NOTE — PROGRESS NOTES
ABG attempted right radial artery needle redirected numerous times unable to get sufficient sample to analyze, RN notified cannot get ABG's

## 2022-07-25 NOTE — PROCEDURES
Procedure: Fiberoptic bronchoscopy with endobronchial lavage    Reason for procedure: Left lower lobe atelectasis secondary to secretions, diffuse aspiration pneumonitis with extensive secretions and mucous plugging    Description of procedure: After obtaining informed consent from the family, the patient was positioned in ICU bed 10 at 30 degrees to the horizontal.  The patient was transitioned from BiPAP to Airvo 60 L FiO2 100% and BiPAP was put in place between her teeth.  12.5 mcg of fentanyl were then given  Due for monitoring, the 4.9 mm videobronchoscope was placed via the oropharyngeal cavity. Extensive secretions that were hard firm and difficult to dislodge first had to be removed from the oropharynx in order to locate the epiglottis and ultimately vocal cords. When this was done, the bronchoscope was placed by the glottic opening into the proximal distal trachea. Procedure, large amounts of white, yellow, and brown secretions most of which was purulent were removed from the right and left mainstem bronchi right upper lobe right middle lobe right lower lobe left upper lobe left lower lobe and segmental and subsegmental bronchi. No structural endobronchial lesions were seen. A total of 60 cc of purulent secretions and mucous plugs were removed and sent for gram stain and C&S. The patient maintained adequate saturation throughout most of the procedure. Following the completion of the bronchoscopy, patient was then placed back on BiPAP to ameliorate her work of breathing. Overall, she tolerated well. The family was informed of results.

## 2022-07-25 NOTE — PROGRESS NOTES
Pharmacy Covid-19 Consult Note    Pharmacy consulted for benefit of baricitinib/tocilizumab/remdesivir. Patient's respiratory function has worsened to now require BiPAP. CRP elevated at 12.6.     Due to concern of being able to receive the baricitinib with BiPAP and NPO status per nursing, we will utilize tocilizumab 400 mg IV x 1      Phillip Foss PharmD 7/24/2022 9:39 PM   [phone]

## 2022-07-25 NOTE — CONSULTS
surfaces left more than right  Gastrointestinal: Soft, flat, nontender  Genitourinary: Urine is dark but not bloody  Extremities: No clubbing, cyanosis, or edema  Neurological: Confused, does not follow commands  Psychological: Unable to evaluate    LABS:  WBC   Date Value Ref Range Status   07/25/2022 25.2 (H) 4.5 - 11.5 E9/L Final   07/24/2022 21.6 (H) 4.5 - 11.5 E9/L Final   07/23/2022 17.9 (H) 4.5 - 11.5 E9/L Final     Hemoglobin   Date Value Ref Range Status   07/25/2022 7.4 (L) 11.5 - 15.5 g/dL Final   07/25/2022 7.3 (L) 11.5 - 15.5 g/dL Final   07/24/2022 7.4 (L) 11.5 - 15.5 g/dL Final     Hematocrit   Date Value Ref Range Status   07/25/2022 24.1 (L) 34.0 - 48.0 % Final   07/24/2022 27.6 (L) 34.0 - 48.0 % Final   07/23/2022 26.5 (L) 34.0 - 48.0 % Final     MCV   Date Value Ref Range Status   07/25/2022 100.4 (H) 80.0 - 99.9 fL Final   07/24/2022 100.4 (H) 80.0 - 99.9 fL Final   07/23/2022 98.9 80.0 - 99.9 fL Final     Platelets   Date Value Ref Range Status   07/25/2022 450 130 - 450 E9/L Final   07/24/2022 461 (H) 130 - 450 E9/L Final   07/23/2022 371 130 - 450 E9/L Final     Sodium   Date Value Ref Range Status   07/25/2022 140 132 - 146 mmol/L Final   07/25/2022 139 132 - 146 mmol/L Final   07/24/2022 138 132 - 146 mmol/L Final     Potassium   Date Value Ref Range Status   07/25/2022 6.1 (H) 3.5 - 5.0 mmol/L Final   07/25/2022 6.4 (H) 3.5 - 5.0 mmol/L Final   07/24/2022 4.9 3.5 - 5.0 mmol/L Final     Chloride   Date Value Ref Range Status   07/25/2022 102 98 - 107 mmol/L Final   07/25/2022 102 98 - 107 mmol/L Final   07/24/2022 99 98 - 107 mmol/L Final     CO2   Date Value Ref Range Status   07/25/2022 30 (H) 22 - 29 mmol/L Final   07/25/2022 31 (H) 22 - 29 mmol/L Final   07/24/2022 34 (H) 22 - 29 mmol/L Final     BUN   Date Value Ref Range Status   07/25/2022 68 (H) 6 - 23 mg/dL Final   07/25/2022 64 (H) 6 - 23 mg/dL Final   07/24/2022 48 (H) 6 - 23 mg/dL Final     Creatinine   Date Value Ref Range Status   07/25/2022 1.1 (H) 0.5 - 1.0 mg/dL Final   07/25/2022 1.1 (H) 0.5 - 1.0 mg/dL Final   07/24/2022 0.7 0.5 - 1.0 mg/dL Final     Glucose   Date Value Ref Range Status   07/25/2022 153 (H) 74 - 99 mg/dL Final   07/25/2022 154 (H) 74 - 99 mg/dL Final   07/24/2022 141 (H) 74 - 99 mg/dL Final     Calcium   Date Value Ref Range Status   07/25/2022 8.6 8.6 - 10.2 mg/dL Final   07/25/2022 8.4 (L) 8.6 - 10.2 mg/dL Final   07/24/2022 9.0 8.6 - 10.2 mg/dL Final     Total Protein   Date Value Ref Range Status   07/25/2022 5.4 (L) 6.4 - 8.3 g/dL Final   07/24/2022 6.0 (L) 6.4 - 8.3 g/dL Final   07/22/2022 5.3 (L) 6.4 - 8.3 g/dL Final     Albumin   Date Value Ref Range Status   07/25/2022 2.3 (L) 3.5 - 5.2 g/dL Final   07/24/2022 2.6 (L) 3.5 - 5.2 g/dL Final   07/22/2022 2.2 (L) 3.5 - 5.2 g/dL Final     Total Bilirubin   Date Value Ref Range Status   07/25/2022 0.6 0.0 - 1.2 mg/dL Final   07/24/2022 0.7 0.0 - 1.2 mg/dL Final   07/22/2022 0.8 0.0 - 1.2 mg/dL Final     Alkaline Phosphatase   Date Value Ref Range Status   07/25/2022 80 35 - 104 U/L Final   07/24/2022 90 35 - 104 U/L Final   07/22/2022 60 35 - 104 U/L Final     AST   Date Value Ref Range Status   07/25/2022 29 0 - 31 U/L Final   07/24/2022 26 0 - 31 U/L Final   07/22/2022 20 0 - 31 U/L Final     ALT   Date Value Ref Range Status   07/25/2022 15 0 - 32 U/L Final   07/24/2022 14 0 - 32 U/L Final   07/22/2022 12 0 - 32 U/L Final     GFR Non-   Date Value Ref Range Status   07/25/2022 46 >=60 mL/min/1.73 Final     Comment:     Chronic Kidney Disease: less than 60 ml/min/1.73 sq.m. Kidney Failure: less than 15 ml/min/1.73 sq.m. Results valid for patients 18 years and older. 07/25/2022 46 >=60 mL/min/1.73 Final     Comment:     Chronic Kidney Disease: less than 60 ml/min/1.73 sq.m. Kidney Failure: less than 15 ml/min/1.73 sq.m. Results valid for patients 18 years and older.      07/24/2022 >60 >=60 mL/min/1.73 Final Comment:     Chronic Kidney Disease: less than 60 ml/min/1.73 sq.m. Kidney Failure: less than 15 ml/min/1.73 sq.m. Results valid for patients 18 years and older. GFR    Date Value Ref Range Status   07/25/2022 56  Final   07/25/2022 56  Final   07/24/2022 >60  Final     No results found for: MG  No results found for: PHOS  Recent Labs     07/24/22  1640   PH 7.348*   PO2 36.6*   PCO2 63.7*   HCO3 34.2*   BE 7.2*   O2SAT 67.6*       RADIOLOGY:  XR CHEST PORTABLE   Final Result   Stable bilateral pulmonary opacities. Possible right pleural effusion. XR CHEST PORTABLE   Final Result   Increasing airspace disease and volume loss in left hemithorax suspicious at   least partial left lower lobe collapse with mucous plugging. Pulmonary consultation is recommended. XR CHEST PORTABLE   Final Result   Increasing bilateral airspace disease with bilateral pleural effusions and   moderate cardiomegaly. XR HIP RIGHT (2-3 VIEWS)   Final Result   It intact right femoral gamma nail with near anatomic alignment of femoral   fracture fragments and severe right hip osteoarthritis. CT HEAD WO CONTRAST   Final Result   No acute intracranial abnormality. Chronic microvascular ischemic changes. Small old lacunar infarcts. FL MODIFIED BARIUM SWALLOW W VIDEO   Final Result   Swallowing dysfunction as described above including aspiration of thin liquid   barium and barium nectar. Please see separate speech pathology report for full discussion of findings   and recommendations. Fluoro For Surgical Procedures   Final Result   Intraprocedural fluoroscopic spot images as above. See separate procedure   report for more information. XR CHEST PORTABLE   Final Result   Bilateral pulmonary opacities, left greater than right, likely   infectious/inflammatory. Aspiration pneumonitis would also be in the   differential diagnosis.          XR HIP RIGHT (1 VIEW)   Final Result      Intertrochanteric fracture of the proximal right femur. XR FEMUR RIGHT (MIN 2 VIEWS)   Final Result   Intertrochanteric fracture of the proximal right femur         XR HIP RIGHT (2-3 VIEWS)   Final Result   Displaced right intertrochanteric fracture with varus deformity. .         XR CHEST 1 VIEW   Final Result   No acute process. CT HEAD WO CONTRAST   Final Result   No acute intracranial abnormality. CT CERVICAL SPINE WO CONTRAST   Final Result   No acute abnormality of the cervical spine. Multilevel degenerative changes. XR CHEST PORTABLE    (Results Pending)       IMPRESSION:  Acute hypoxemic respiratory failure  Extensive bilateral pneumonia left worse than right  Volume loss left lung secondary to mucous plugging  Status post right hip ORIF  MANA with probable vascular depletion  Possible sepsis with severe leukocytosis        PLAN:  We will do what we can and move the patient to the intensive care unit and perform bronchoscopy in order to give her at least some chance of survival  She will need fluids, antibiotics, bronchopulmonary hygiene, pressors if needed and, if all is hopeless, comfort measures. I have spoken to the daughter Esvin Ceballos and she is aware of the patient's likely poor outcome. ATTESTATION:  ICU Staff Physician note of personal involvement in Care  As the attending physician, I certify that I personally reviewed the patients history and personally examined the patient to confirm the physical findings described above,  And that I reviewed the relevant imaging studies and available reports. I also discussed the differential diagnosis and all of the proposed management plans with the patient and individuals accompanying the patient to this visit. They had the opportunity to ask questions about the proposed management plans and to have those questions answered.      This patient has a high probability of sudden, clinically significant deterioration, which requires the highest level of physician preparedness to intervene urgently. I managed/supervised life or organ supporting interventions that required frequent physician assessment. I devoted my full attention to the direct care of this patient for the amount of time indicated below. Time I spent with the family or surrogate(s) is included only if the patient was incapable of providing the necessary information or participating in medical decisions - Time devoted to teaching and to any procedures I billed separately is not included.     CRITICAL CARE TIME:  45 minutes    Electronically signed by Aleah Chou MD on 7/25/2022 at 4:20 PM

## 2022-07-25 NOTE — PROGRESS NOTES
Pharmacy Covid-19 Consult Note    Pharmacy consulted for benefit of baricitinib/tocilizumab/remdesivir. Patient's respiratory function has worsened to now require BiPAP. CRP elevated at 12.6. Due to concern of being able to receive the baricitinib with BiPAP and NPO status per nursing, we will utilize tocilizumab 400 mg IV x 1      Olga Lidia BatesD   [phone]    7/25: s/p toci yesterday, pharmacy will sign off. Please re-consult if needed.     Diamante Cheng, Olga LidiaD, BCPS 7/25/2022 10:52 AM   [phone]

## 2022-07-25 NOTE — PROGRESS NOTES
Physical Therapy    Physical Therapy on hold d/t trf to icu; please reorder PT EVAL AND TREAT when patient able to participate   Electronically signed by Paige Daly PT on 7/25/2022 at 6:06 PM

## 2022-07-25 NOTE — PROGRESS NOTES
Department of Internal Medicine  General Internal Medicine  Attending Progress Note  Chief Complaint   Patient presents with    Fall     SUBJECTIVE:    Patient is on bipap and lethargic. Patient nurse in the room. Discussed management.      OBJECTIVE      Medications    Current Facility-Administered Medications: 0.9 % sodium chloride infusion, , IntraVENous, Continuous  dexamethasone (DECADRON) injection 6 mg, 6 mg, IntraVENous, Q24H  enoxaparin Sodium (LOVENOX) injection 30 mg, 30 mg, SubCUTAneous, Daily  fluconazole (DIFLUCAN) in 0.9 % sodium chloride IVPB 200 mg, 200 mg, IntraVENous, Q24H  ampicillin-sulbactam (UNASYN) 3000 mg in 100 mL NS IVPB minibag, 3,000 mg, IntraVENous, Q6H  polyethylene glycol (GLYCOLAX) packet 17 g, 17 g, Per G Tube, BID  bisacodyl (DULCOLAX) suppository 10 mg, 10 mg, Rectal, Daily PRN  docusate (COLACE) 50 MG/5ML liquid 100 mg, 100 mg, Per G Tube, BID  [Held by provider] fluconazole (DIFLUCAN) 40 MG/ML suspension 200 mg, 200 mg, Per G Tube, Daily  metoprolol tartrate (LOPRESSOR) tablet 50 mg, 50 mg, Oral, TID  sodium chloride flush 0.9 % injection 5-40 mL, 5-40 mL, IntraVENous, 2 times per day  sodium chloride flush 0.9 % injection 5-40 mL, 5-40 mL, IntraVENous, PRN  0.9 % sodium chloride infusion, 25 mL, IntraVENous, PRN  pantoprazole (PROTONIX) injection 40 mg, 40 mg, IntraVENous, Daily  aspirin chewable tablet 81 mg, 81 mg, Oral, BID WC  morphine (PF) injection 2 mg, 2 mg, IntraVENous, Q2H PRN **OR** morphine injection 4 mg, 4 mg, IntraVENous, Q2H PRN  oxyCODONE (ROXICODONE) immediate release tablet 5 mg, 5 mg, Oral, Q4H PRN **OR** [DISCONTINUED] oxyCODONE (ROXICODONE) immediate release tablet 10 mg, 10 mg, Oral, Q4H PRN  Physical    VITALS:  BP (!) 127/51   Pulse 82   Temp 98.8 °F (37.1 °C) (Axillary)   Resp 20   Ht 5' 2\" (1.575 m)   Wt 91 lb 3.2 oz (41.4 kg)   SpO2 100%   BMI 16.68 kg/m²   CONSTITUTIONAL:  awake and alert  EYES:  extra-ocular muscles intact and vision intact  ENT:  normocepalic, without obvious abnormality  NECK:  supple, symmetrical, trachea midline  LUNGS:  no increased work of breathing and no retractions  CARDIOVASCULAR:  normal apical pulses and normal S1 and S2  ABDOMEN:  normal bowel sounds, non-distended, and non-tender  MUSCULOSKELETAL:  there is no redness, warmth, or swelling of the joints  NEUROLOGIC:  Mental Status Exam:  Level of Alertness:   awake  Orientation:   person, place  SKIN:  no bruising or bleeding  Data    CBC:   Lab Results   Component Value Date/Time    WBC 25.2 07/25/2022 07:54 AM    RBC 2.40 07/25/2022 07:54 AM    HGB 7.4 07/25/2022 10:54 AM    HCT 24.1 07/25/2022 07:54 AM    .4 07/25/2022 07:54 AM    MCH 30.4 07/25/2022 07:54 AM    MCHC 30.3 07/25/2022 07:54 AM    RDW 15.9 07/25/2022 07:54 AM     07/25/2022 07:54 AM    MPV 10.2 07/25/2022 07:54 AM     BMP:    Lab Results   Component Value Date/Time     07/25/2022 07:54 AM    K 6.4 07/25/2022 07:54 AM     07/25/2022 07:54 AM    CO2 31 07/25/2022 07:54 AM    BUN 64 07/25/2022 07:54 AM    LABALBU 2.3 07/25/2022 07:54 AM    CREATININE 1.1 07/25/2022 07:54 AM    CALCIUM 8.4 07/25/2022 07:54 AM    GFRAA 56 07/25/2022 07:54 AM    LABGLOM 46 07/25/2022 07:54 AM    GLUCOSE 154 07/25/2022 07:54 AM       ASSESSMENT AND PLAN        1. Hip fracture requiring operative repair, left, open type I or II, initial encounter   S/p repair  Continue to monitor  PT/OT  Anticoagulation per ortho recommendations    2. Severe protein-calorie malnutrition   On tube feeding    3. Acute encephalopathy  Continue to monitor    4. Acute hypoxemic respiratory failure due to COVID-19:  Bipap vs airvo  Consult placed to [facility] for help with management of NIPPV     5. Aspiration pneumonia:  On IV unasyn  Noted worsening of WBC     6. Hyperkalemia:   Treated with IV lasix, and IV Calcium gluconate. Repeat K  Suspects poor renal clearance may be the cause.      7.  Covid 19 viral infection: appreciate ID recommendation  Treated with Tolci  Monitor inflammatory markers    8. Acute blood loss anemia:   Hgb stable   Continue to monitor and transfuse as needed.      Patient prognosis guarded

## 2022-07-25 NOTE — PROGRESS NOTES
[facility]  [facility]  Progress Note    NAME: Casey Smallwood  MR:  [id number]  :   10/17/1928  DATE OF SERVICE:22    This is a face to face encounter with Casey Smallwood 80 y.o. female on 22  Elements of this note, including Diagnosis,  Interval History, Past Medical/Surgical/Family/Social Histories, ROS, physical exam, and Assessment and Plan were copied and pasted from Previous. Updates have been made where noted and reflect current exam and medical decision making from the DOS of this encounter. CHIEF COMPLAINT     ID following for   Chief Complaint   Patient presents with    Fall     HISTORY OF PRESENT ILLNESS     Pt seen and examined  22  SEEN THIS AM ON BIPAP  FEVER YESTERDAY TESTED + COVID    FRAIL   Patient is tolerating medications. No reported adverse drug reactions. REVIEW OF SYSTEMS     As stated above in the chief complaint, otherwise negative.   CURRENT MEDICATIONS      calcium gluconate IVPB  1,000 mg IntraVENous NOW    dexamethasone  6 mg IntraVENous Q24H    enoxaparin  30 mg SubCUTAneous Daily    fluconazole  200 mg IntraVENous Q24H    ampicillin-sulbactam  3,000 mg IntraVENous Q6H    polyethylene glycol  17 g Per G Tube BID    docusate  100 mg Per G Tube BID    [Held by provider] fluconazole  200 mg Per G Tube Daily    metoprolol tartrate  50 mg Oral TID    sodium chloride flush  5-40 mL IntraVENous 2 times per day    pantoprazole  40 mg IntraVENous Daily    aspirin  81 mg Oral BID WC     Continuous Infusions:   sodium chloride 75 mL/hr at 22 0944    sodium chloride 25 mL (22 2306)     PRN Meds:bisacodyl, sodium chloride flush, sodium chloride, morphine **OR** morphine, oxyCODONE **OR** [DISCONTINUED] oxyCODONE    PHYSICAL EXAM     BP (!) 127/51   Pulse 82   Temp 98.8 °F (37.1 °C) (Axillary)   Resp 24   Ht 5' 2\" (1.575 m)   Wt 91 lb 3.2 oz (41.4 kg)   SpO2 100%   BMI 16.68 kg/m²   Temp  Av.8 °F (37.7 °C)  Min: 98.8 °F (37.1 °C)  Max: 102.4 °F (39.1 °C)  Constitutional:  The patient is  thin BIPAP  Skin:    Warm and dry. HEENT:    AT/NC   Chest:    Symmetrical expansion. Dec bs bases SOME RALES ANT   Cardiovascular:  S1 and S2 are rhythmic and regular. Abdomen:   Positive bowel sounds to auscultation. Benign to palpation. Extremities:    Edema   CNS    LETHARGIC  Lines: piv  [place]          DIAGNOSTIC RESULTS   Radiology:    Recent Labs     07/23/22  0768 07/23/22  1225 07/24/22  0834 07/24/22  1207 07/24/22  2329 07/25/22  0754 07/25/22  1054   WBC 17.9*  --  21.6*  --   --  25.2*  --    RBC 2.68*  --  2.75*  --   --  2.40*  --    HGB 7.9*   < > 8.1*   < > 7.4* 7.3* 7.4*   HCT 26.5*  --  27.6*  --   --  24.1*  --    MCV 98.9  --  100.4*  --   --  100.4*  --    MCH 29.5  --  29.5  --   --  30.4  --    MCHC 29.8*  --  29.3*  --   --  30.3*  --    RDW 15.7*  --  15.7*  --   --  15.9*  --      --  461*  --   --  450  --    MPV 10.4  --  10.3  --   --  10.2  --     < > = values in this interval not displayed.        Recent Labs     07/22/22  1747 07/24/22  0834 07/25/22  0754    138 139   K 5.1* 4.9 6.4*   CL 98 99 102   CO2 31* 34* 31*   BUN 30* 48* 64*   CREATININE 0.7 0.7 1.1*   GLUCOSE 131* 141* 154*   PROT 5.3* 6.0* 5.4*   LABALBU 2.2* 2.6* 2.3*   CALCIUM 8.5* 9.0 8.4*   BILITOT 0.8 0.7 0.6   ALKPHOS 60 90 80   AST 20 26 29   ALT 12 14 15       Lab Results   Component Value Date    CRP 16.1 (H) 07/25/2022    CRP 12.6 (H) 07/24/2022     Lab Results   Component Value Date    SEDRATE 12 02/04/2013     Recent Labs     07/22/22  1747 07/24/22  0829 07/24/22  0834 07/25/22  0754   CRP  --  12.6*  --  16.1*   PROCAL 0.92*  --  0.63*  --    LDH  --  345*  --  238*   AST 20  --  26 29   ALT 12  --  14 15       Microbiology:      FINAL IMPRESSION    Pt had   Chief Complaint   Patient presents with    Fall    [facility] [S72.001A]  Hip fracture requiring operative repair, left, open type I or II, initial encounter ([facility] [S72.002B]  Fall on same level from slipping, tripping or stumbling, initial encounter [W01. 0XXA]  Traumatic rhabdomyolysis, initial encounter ([facility] [place]. 6XXA]  On treatment for   Leukocytosis  going up has rigth thigh hematoma   Covid + ON BIAPAP S/P toci   Fevers better   s/p left hip fracture  Procedure(s): 7/15  RIGHT INTROCHANTERIC CEPHALOMEDULLARY NAIL  INSERTION  Rigth thigh hematoma   UTI s/p ceftriaxone     7/21 BLOOD/URINE CX ngtd    CXRY   7/24 Increasing airspace disease and volume loss in left hemithorax suspicious at  least partial left lower lobe collapse with mucous plugging. REPEAT Covid screen + 7/25      fluconazole (DIFLUCAN) in 0.9 % sodium chloride IVPB 200 mg, Q24H  ampicillin-sulbactam (UNASYN) 3000 mg in 100 mL NS IVPB minibag, Q6H     CHECK VANCO LEVEL REDOSE IF LESS THEN 20       Monitor labs cbc    Imaging and labs were reviewed per medical records.         Electronically signed by Lamberto Brush MD on 7/25/2022 at 2:01 PM

## 2022-07-25 NOTE — PROGRESS NOTES
Date:2022  Patient Name: Tessie Sharma  MRN: [id number]  : 10/17/1928  ROOM #: 0521/0521-01    Occupational Therapy treatment attempted this date. Patient not appropriate for therapy this AM, per RN. Will re-attempt OT tx at a later time. Thank you.      Krista De León OTR/L #[id number]

## 2022-07-25 NOTE — PROGRESS NOTES
Physical Therapy    0521/0521-01    Patient unavailable for physical therapy treatment due to RN Gael Garner) stated patient had a RRT called on her yesterday and to check with her this pm for a possible therapy session. Tran August.  Rodolfo  [facility]  LIC # [id number]

## 2022-07-25 NOTE — PROGRESS NOTES
Cardiology  Progress Note      SUBJECTIVE:  patient had CPAP mask on when I came to see her. There is sluggish responses to verbal stimuli.     Current Inpatient Medications  Current Facility-Administered Medications: 0.9 % sodium chloride infusion, , IntraVENous, Continuous  dexamethasone (DECADRON) injection 6 mg, 6 mg, IntraVENous, Q24H  enoxaparin Sodium (LOVENOX) injection 30 mg, 30 mg, SubCUTAneous, Daily  fluconazole (DIFLUCAN) in 0.9 % sodium chloride IVPB 200 mg, 200 mg, IntraVENous, Q24H  ampicillin-sulbactam (UNASYN) 3000 mg in 100 mL NS IVPB minibag, 3,000 mg, IntraVENous, Q6H  polyethylene glycol (GLYCOLAX) packet 17 g, 17 g, Per G Tube, BID  bisacodyl (DULCOLAX) suppository 10 mg, 10 mg, Rectal, Daily PRN  docusate (COLACE) 50 MG/5ML liquid 100 mg, 100 mg, Per G Tube, BID  [Held by provider] fluconazole (DIFLUCAN) 40 MG/ML suspension 200 mg, 200 mg, Per G Tube, Daily  metoprolol tartrate (LOPRESSOR) tablet 50 mg, 50 mg, Oral, TID  sodium chloride flush 0.9 % injection 5-40 mL, 5-40 mL, IntraVENous, 2 times per day  sodium chloride flush 0.9 % injection 5-40 mL, 5-40 mL, IntraVENous, PRN  0.9 % sodium chloride infusion, 25 mL, IntraVENous, PRN  pantoprazole (PROTONIX) injection 40 mg, 40 mg, IntraVENous, Daily  aspirin chewable tablet 81 mg, 81 mg, Oral, BID WC  morphine (PF) injection 2 mg, 2 mg, IntraVENous, Q2H PRN **OR** morphine injection 4 mg, 4 mg, IntraVENous, Q2H PRN  oxyCODONE (ROXICODONE) immediate release tablet 5 mg, 5 mg, Oral, Q4H PRN **OR** [DISCONTINUED] oxyCODONE (ROXICODONE) immediate release tablet 10 mg, 10 mg, Oral, Q4H PRN      Physical  VITALS:  BP (!) 113/45   Pulse 89   Temp 99 °F (37.2 °C) (Axillary)   Resp (!) 39   Ht 5' 2\" (1.575 m)   Wt 91 lb 3.2 oz (41.4 kg)   SpO2 98%   BMI 16.68 kg/m²   CURRENT TEMPERATURE:  Temp: 99 °F (37.2 °C)    DATA:      ECG:  I have reviewed EKG with the following interpretation:  Sinus rhythm with frequent PACs    Cardiology Labs:  BMP: Lab Results   Component Value Date/Time     07/25/2022 07:54 AM    K 6.4 07/25/2022 07:54 AM     07/25/2022 07:54 AM    CO2 31 07/25/2022 07:54 AM    BUN 64 07/25/2022 07:54 AM     CBC:    Lab Results   Component Value Date/Time    WBC 25.2 07/25/2022 07:54 AM    RBC 2.40 07/25/2022 07:54 AM    HGB 7.3 07/25/2022 07:54 AM    HCT 24.1 07/25/2022 07:54 AM    .4 07/25/2022 07:54 AM    RDW 15.9 07/25/2022 07:54 AM     07/25/2022 07:54 AM     PT/INR:  No results found for: PTINR  TROPONIN:  No components found for: TROP    ASSESSMENT    1.right hip fracture. Patient underwent surgery and she tolerated that fairly well from cardiac standpoint. 2.sinus tachycardia with PACs. On metoprolol. Dose of metoprolol was increased further to 50 mg 3 times a day and atrial arrhythmias are much better now    3.aspiration problem. Status post PEG tube. Patient tested positive for covid 19. Prognosis overall is not good. I recommend discussing DNR issues with the family. PLAN  As per orders.

## 2022-07-25 NOTE — PROGRESS NOTES
Patient continues to fight bipap pulling down from nose.   Straps on Bipap reorient to help keep mask from sliding down her nose

## 2022-07-25 NOTE — PROGRESS NOTES
Dr. Merlinda Feeling called with concern pt respiratory status respiratory rate 50. Requested ability to use on board morphine for shortness of breath. Morphine to be given to assist with respiratory rate and work of breathing.  Additional request for medications to be administered via peg tube and change current order to NPO meds only to be re-evaluated by primary team.        Electronically signed by Gala Dia RN on 7/25/22 at 8:12 AM EDT

## 2022-07-25 NOTE — PLAN OF CARE
Problem: Pain  Goal: Verbalizes/displays adequate comfort level or baseline comfort level  Outcome: Progressing  Flowsheets (Taken 7/24/2022 2353 by Nannette Turner RN)  Verbalizes/displays adequate comfort level or baseline comfort level: Assess pain using appropriate pain scale     Problem: Safety - Adult  Goal: Free from fall injury  Outcome: Progressing     Problem: ABCDS Injury Assessment  Goal: Absence of physical injury  Outcome: Progressing     Problem: Skin/Tissue Integrity  Goal: Absence of new skin breakdown  Description: 1. Monitor for areas of redness and/or skin breakdown  2. Assess vascular access sites hourly  3. Every 4-6 hours minimum:  Change oxygen saturation probe site  4. Every 4-6 hours:  If on nasal continuous positive airway pressure, respiratory therapy assess nares and determine need for appliance change or resting period.   Outcome: Progressing     Problem: Discharge Planning  Goal: Discharge to home or other facility with appropriate resources  Outcome: Progressing     Problem: Nutrition Deficit:  Goal: Optimize nutritional status  Outcome: Progressing

## 2022-07-26 NOTE — PROGRESS NOTES
20.12 kg/m²   CURRENT TEMPERATURE:  Temp: 97.6 °F (36.4 °C)  Face-to-face exam was not performed to minimize the risk of spread of covert 19 infection as per guidelines. I examined the patient daily during this hospital stay and I am reviewing the physical exams that are done by the Critical care team    DATA:      ECG:  I have reviewed EKG with the following interpretation:  Sinus rhythm with PACs    Cardiology Labs:  BMP:    Lab Results   Component Value Date/Time     07/26/2022 05:31 AM    K 5.8 07/26/2022 05:31 AM     07/26/2022 05:31 AM    CO2 30 07/26/2022 05:31 AM    BUN 74 07/26/2022 05:31 AM     CBC:    Lab Results   Component Value Date/Time    WBC 23.4 07/26/2022 05:31 AM    RBC 2.55 07/26/2022 05:31 AM    HGB 7.5 07/26/2022 05:31 AM    HCT 26.3 07/26/2022 05:31 AM    .1 07/26/2022 05:31 AM    RDW 16.6 07/26/2022 05:31 AM     07/26/2022 05:31 AM     PT/INR:  No results found for: PTINR  TROPONIN:  No components found for: TROP    ASSESSMENT    1.right hip fracture. Patient underwent surgery and she tolerated that fairly well from cardiac standpoint. 2.sinus tachycardia with PACs. On metoprolol. Dose of metoprolol was increased further to 50 mg 3 times a day and atrial arrhythmias are better now    3.aspiration problem. Status post PEG tube. Patient tested positive for covid 19. Patient underwent bronchoscopy yesterday. Prognosis overall is not good. Family made patient DNR CCA.

## 2022-07-26 NOTE — PROCEDURES
STEVO power PICC Placement 7/26/2022    Product number: ask [id number]   Lot Number: [id number]      Ultrasound: yes   Left Brachial vein:                Upper Arm Circumference: 18cm      Size: 5.5fr x 37cm    Exposed Length: 0     Internal Length: 37cm   Cut: 37cm   Vein Measurement: 0.45cm      Consent obtained  Pt.  Confused  Fidgety during insertion  Tolerated well  Brisk blood return noted  Unable to reach tip arina d/t AF  CXR obtained  RN notified, will follow    Janet De León RN  7/26/2022  11:24 AM        CXR placement verified by CXR, RN notified

## 2022-07-26 NOTE — PROGRESS NOTES
[facility]  [facility]  Progress Note    NAME: Deepti Miranda  MR:  [id number]  :   10/17/1928  DATE OF SERVICE:22    This is a face to face encounter with Deepti Miranda 80 y.o. female on 22  Elements of this note, including Diagnosis,  Interval History, Past Medical/Surgical/Family/Social Histories, ROS, physical exam, and Assessment and Plan were copied and pasted from Previous. Updates have been made where noted and reflect current exam and medical decision making from the DOS of this encounter. CHIEF COMPLAINT     ID following for   Chief Complaint   Patient presents with    Fall     HISTORY OF PRESENT ILLNESS     Pt seen and examined  22  In icu   S/p bronch   Afebrile  hfnc /NRB  Wbc23.4 cr1.1  Spoke with nurse    Pt is more awake  G-daughter present had no questions  Patient is tolerating medications. No reported adverse drug reactions. REVIEW OF SYSTEMS     As stated above in the chief complaint, otherwise negative.   CURRENT MEDICATIONS      acetylcysteine  4 mL Inhalation Q4H    lidocaine PF  5 mL IntraDERmal Once    sodium chloride flush  5-40 mL IntraVENous 2 times per day    heparin flush  3 mL IntraVENous 2 times per day    piperacillin-tazobactam  3,375 mg IntraVENous Q12H    ipratropium-albuterol  1 ampule Inhalation Q4H    dexamethasone  6 mg IntraVENous Q24H    enoxaparin  30 mg SubCUTAneous Daily    fluconazole  200 mg IntraVENous Q24H    polyethylene glycol  17 g Per G Tube BID    docusate  100 mg Per G Tube BID    [Held by provider] fluconazole  200 mg Per G Tube Daily    metoprolol tartrate  50 mg Oral TID    pantoprazole  40 mg IntraVENous Daily    aspirin  81 mg Oral BID WC     Continuous Infusions:   sodium chloride      sodium chloride 75 mL/hr at 22 2143    sodium chloride 25 mL (22 2306)     PRN Meds:sodium chloride flush, sodium chloride, heparin flush, bisacodyl, sodium chloride flush, sodium chloride    PHYSICAL EXAM     /69   Pulse 86   Temp 97.6 °F (36.4 °C) (Axillary)   Resp 19   Ht 5' 2\" (1.575 m)   Wt 110 lb (49.9 kg)   SpO2 91%   BMI 20.12 kg/m²   Temp  Av.5 °F (36.4 °C)  Min: 96.5 °F (35.8 °C)  Max: 98.8 °F (37.1 °C)  Constitutional:  The patient is  on HFNC/NRB   Skin:    Warm and dry. HEENT:       Chest:    Symmetrical expansion. Dec bs bases SOME RALES ANT   Cardiovascular:  S1 and S2 are rhythmic and regular. Abdomen:   Positive bowel sounds to auscultation. Benign to palpation. Extremities:    Edema   CNS    Awake agitated  Lines: piv  Purwick          DIAGNOSTIC RESULTS   Radiology:    Recent Labs     22  0834 22  1207 22  0754 22  1054 22  0531   WBC 21.6*  --  25.2*  --  23.4*   RBC 2.75*  --  2.40*  --  2.55*   HGB 8.1*   < > 7.3* 7.4* 7.5*   HCT 27.6*  --  24.1*  --  26.3*   .4*  --  100.4*  --  103.1*   MCH 29.5  --  30.4  --  29.4   MCHC 29.3*  --  30.3*  --  28.5*   RDW 15.7*  --  15.9*  --  16.6*   *  --  450  --  510*   MPV 10.3  --  10.2  --  10.3    < > = values in this interval not displayed.        Recent Labs     22  0754 22  1421 22  2303 22  0531    140 138 141   K 6.4* 6.1* 5.7* 5.8*    102 102 104   CO2 31* 30* 27 30*   BUN 64* 68* 72* 74*   CREATININE 1.1* 1.1* 1.1* 1.1*   GLUCOSE 154* 153* 145* 138*   PROT 5.4* 5.5*  --  5.3*   LABALBU 2.3* 2.3*  --  2.3*   CALCIUM 8.4* 8.6 8.1* 8.6   BILITOT 0.6 0.6  --  0.7   ALKPHOS 80 81  --  81   AST 29 28  --  24   ALT 15 16  --  15       Lab Results   Component Value Date    CRP 16.1 (H) 2022    CRP 12.6 (H) 2022     Lab Results   Component Value Date    SEDRATE 12 2013     Recent Labs     22  0829 22  0834 22  0834 22  0754 22  1421 22  0531   CRP 12.6*  --   --  16.1*  --   --    PROCAL  --  0.63*  --   --   --   --    *  --   --  238*  --   --    AST  --  26   < > 29 28 24   ALT  -- 14   < > 15 16 15    < > = values in this interval not displayed. Microbiology:      FINAL IMPRESSION    Pt had   Chief Complaint   Patient presents with    Fall    Admitted for Closed fracture of right hip, initial encounter [facility]) [S72.001A]  Hip fracture requiring operative repair, left, open type I or II, initial encounter ([facility] [address] [S72.002B]  Fall on same level from slipping, tripping or stumbling, initial encounter [W01. 0XXA]  Tra[facility]habdomyolysis, [facility] [place]. 6XXA]  On treatment for   Leukocytosis    Covid +  7/24 S/P toci   HCAP vs aspiration LLL       7/25/ s/p bronch  7/15 mucous plug  RIGHT INTROCHANTERIC CEPHALOMEDULLARY NAIL  INSERTION  Rigth thigh hematoma   UTI s/p ceftriaxone         piperacillin-tazobactam (ZOSYN) 3,375 mg in dextrose 5 % 50 mL IVPB extended infusion (mini-bag), Q12H  ipratropium-albuterol (DUONEB) nebulizer solution 1 ampule, Q4H  0.9 % sodium chloride infusion, Continuous  dexamethasone (DECADRON) injection 6 mg, Q24H  enoxaparin Sodium (LOVENOX) injection 30 mg, Daily  fluconazole (DIFLUCAN) in 0.9 % sodium chloride IVPB 200 mg, Q24H     Check cx      Imaging and labs were reviewed per medical records.         Electronically signed by Carmen Vogt MD on 7/26/2022 at 9:08 AM

## 2022-07-26 NOTE — PROGRESS NOTES
Pharmacy Consultation Note  (Antibiotic Dosing and Monitoring)    Initial consult date: 7/26  Consulting physician/provider: Maryse Zamora  Drug: Vancomycin  Indication: Pneumonia    Age/  Gender Height Weight IBW  Allergy Information   89 y.o./female 5' 2\" (157.5 cm) 98 lb (44.5 kg)     Ideal body weight: 50.1 kg (110 lb 7.2 oz)   Biaxin [clarithromycin] and Floxin [ofloxacin]      Renal Function:  Recent Labs     07/25/22  2303 07/26/22  0531 07/26/22  1159   BUN 72* 74* 74*   CREATININE 1.1* 1.1* 1.1*       Intake/Output Summary (Last 24 hours) at 7/26/2022 1309  Last data filed at 7/26/2022 1226  Gross per 24 hour   Intake 1375 ml   Output 300 ml   Net 1075 ml       Vancomycin Monitoring:  Trough:  No results for input(s): VANCOTROUGH in the last 72 hours. Random:    Recent Labs     07/25/22  1421   VANCORANDOM 15.2       Vancomycin Administration Times:  Recent vancomycin administrations                     vancomycin (VANCOCIN) 1,000 mg in sodium chloride 0.9 % 250 mL IVPB ([id number]) (mg) 1,000 mg New Bag 07/24/22 1241    vancomycin (VANCOCIN) 1,000 mg in sodium chloride 0.9 % 250 mL IVPB ([id number]) (mg) 1,000 mg New Bag 07/23/22 1348                    Assessment:  Patient is a 80 y.o. female who has been initiated on vancomycin  Estimated Creatinine Clearance: 25 mL/min (A) (based on SCr of 1.1 mg/dL (H)). To dose vancomycin, pharmacy will be utilizing dosing based off of levels because of patient's renal impairment/insufficiency  7/26: Scr 1.1, increased from baseline (0.6mcg/mL)    Plan:   Will give vancomycin 1000 mg IV once  Will check vancomycin levels when appropriate  Will continue to monitor renal function   Clinical pharmacy to follow      Jennifer Cody [facility] 7/26/2022 1:09 PM

## 2022-07-26 NOTE — ACP (ADVANCE CARE PLANNING)
Advance Care Planning     Advance Care Planning Activator (Inpatient)  Conversation Note      Date of ACP Conversation: 7/26/2022     Conversation Conducted with:  Daughter Torres Ly    ACP Activator: Rohan Chicas RN    Health Care Decision Maker:     Current Designated Health Care Decision Maker:     Primary Decision Maker: Navya Wasserman - Child - [phone]    Primary Decision Maker: Aneta Nagel - Child - [phone]    Primary Decision Maker: Alvarez Killian Child - [phone]    Care Preferences    Ventilation: \"If you were in your present state of health and suddenly became very ill and were unable to breathe on your own, what would your preference be about the use of a ventilator (breathing machine) if it were available to you? \"      Would the patient desire the use of ventilator (breathing machine)?: no    \"If your health worsens and it becomes clear that your chance of recovery is unlikely, what would your preference be about the use of a ventilator (breathing machine) if it were available to you? \"     Would the patient desire the use of ventilator (breathing machine)?: No      Resuscitation  \"CPR works best to restart the heart when there is a sudden event, like a heart attack, in someone who is otherwise healthy. Unfortunately, CPR does not typically restart the heart for people who have serious health conditions or who are very sick. \"    \"In the event your heart stopped as a result of an underlying serious health condition, would you want attempts to be made to restart your heart (answer \"yes\" for attempt to resuscitate) or would you prefer a natural death (answer \"no\" for do not attempt to resuscitate)? \" no       [x] Yes   [] No   Educated Patient / Arjun Marinelli regarding differences between Advance Directives and portable DNR orders.     Length of ACP Conversation in minutes:  10    Conversation Outcomes:  [x] ACP discussion completed  [] Existing advance directive reviewed with patient; no changes to patient's previously recorded wishes  [] New Advance Directive completed  [] Portable Do Not Rescitate prepared for Provider review and signature  [] POLST/POST/MOLST/MOST prepared for Provider review and signature      Follow-up plan:    [] Schedule follow-up conversation to continue planning  [] Referred individual to Provider for additional questions/concerns   [] Advised patient/agent/surrogate to review completed ACP document and update if needed with changes in condition, patient preferences or care setting    [x] This note routed to one or more involved healthcare providers         Electronically signed by FILIPPO Yan Arm on 7/26/2022 at 9:37 AM

## 2022-07-26 NOTE — PROGRESS NOTES
Pharmacist Review and Automatic Dose Adjustment of Prophylactic Enoxaparin    The reviewing pharmacist has made an adjustment to the ordered enoxaparin dose or converted to UFH per the approved [facility] protocol and table as identified below. Evelyn Kwong is a 80 y.o. female. Recent Labs     07/25/22  2303 07/26/22  0531 07/26/22  1159   CREATININE 1.1* 1.1* 1.1*       Estimated Creatinine Clearance: 25 mL/min (A) (based on SCr of 1.1 mg/dL (H)). Recent Labs     07/25/22  0754 07/25/22  1054 07/26/22  0531   HGB 7.3* 7.4* 7.5*   HCT 24.1*  --  26.3*     --  510*     No results for input(s): INR in the last 72 hours. Height:   Ht Readings from Last 1 Encounters:   07/18/22 5' 2\" (1.575 m)     Weight:  Wt Readings from Last 1 Encounters:   07/26/22 110 lb (49.9 kg)           Plan: Based upon the patient's weight and renal function, the ordered enoxaparin dose of 30 mg daily has been changed/converted to Heparin 5000 units SUB-Q twice a day.       Thank you,  CAIN Preciado.,0/65/6119 2:25 PM

## 2022-07-26 NOTE — PROGRESS NOTES
PROGRESS NOTE  By Norma Mckeon M.D. [facility]  Dr. Juliana Manning M.D.,  Dr. Tani Deleon M.D.,   Dr. Cesar Mancera D.O.,  Dr. Blayne Mckinnon M.D.,  Dr. Kwan Miller D.O.,  Ileana Friday, D.ODianna Colton Lowe  80 y.o.  female    Patient not seen and examined in face-to-face encounter secondary to COVID-19 positive status in an effort to limit transmission/exposure and to preserve PPE. Pertinent notes/labs reviewed. Respiratory failure requiring bronchoscopy yesterday. H&H appears stable      OBJECTIVE:    /69   Pulse 86   Temp 97.6 °F (36.4 °C) (Axillary)   Resp 19   Ht 5' 2\" (1.575 m)   Wt 110 lb (49.9 kg)   SpO2 91%   BMI 20.12 kg/m²       DATA:    Monitor data reviewed -sinus rhythm noted.     Stool (measured) : 0 mL  Lab Results   Component Value Date/Time    WBC 23.4 07/26/2022 05:31 AM    RBC 2.55 07/26/2022 05:31 AM    HGB 7.5 07/26/2022 05:31 AM    HCT 26.3 07/26/2022 05:31 AM    .1 07/26/2022 05:31 AM    MCH 29.4 07/26/2022 05:31 AM    MCHC 28.5 07/26/2022 05:31 AM    RDW 16.6 07/26/2022 05:31 AM     07/26/2022 05:31 AM    MPV 10.3 07/26/2022 05:31 AM     Lab Results   Component Value Date/Time     07/26/2022 05:31 AM    K 5.8 07/26/2022 05:31 AM     07/26/2022 05:31 AM    CO2 30 07/26/2022 05:31 AM    BUN 74 07/26/2022 05:31 AM    CREATININE 1.1 07/26/2022 05:31 AM    CALCIUM 8.6 07/26/2022 05:31 AM    PROT 5.3 07/26/2022 05:31 AM    LABALBU 2.3 07/26/2022 05:31 AM    BILITOT 0.7 07/26/2022 05:31 AM    ALKPHOS 81 07/26/2022 05:31 AM    AST 24 07/26/2022 05:31 AM    ALT 15 07/26/2022 05:31 AM     No results found for: LIPASE  No results found for: AMYLASE      ASSESSMENT/PLAN:  Patient Active Problem List   Diagnosis    Degenerative arthropathy of spinal facet joint, Multilevel of the cervical spine    DDD (degenerative disc disease), cervicalC6-C7    Spondylosis, lumbosacral    Lumbar facet arthropathy    Lumbar spinal stenosis

## 2022-07-26 NOTE — PROGRESS NOTES
Pulmonary/Critical Care Progress Note    We are following patient for acute respiratory failure, aspiration pneumonia, bilateral extensive infiltrates, status post bronchoscopy, status post ORIF right hip, rhabdomyolysis, protein calorie malnutrition, MANA    SUBJECTIVE:  Patient tolerated being on BiPAP overnight. However, she is unable to cough up secretions with positive pressure noninvasive ventilation and she is not to be intubated. Therefore, we have switched you to Airvo in combination with a 100% nonrebreather and she is currently saturating 96 with a heart rate of 83. She is gesticulating and is trying to talk. We have restarted tube feedings at a much lower dose so that she will not aspirate. Nevertheless, the patient's underlying illnesses preclude an optimistic prognosis; we will do the best we can and try to support her with the tools available including IV fluids, IV antibiotics, corticosteroids, aerosolized bronchodilators, and is much respiratory support as we can offer.     MEDICATIONS:   acetylcysteine  4 mL Inhalation Q4H    sodium chloride flush  5-40 mL IntraVENous 2 times per day    heparin flush  3 mL IntraVENous 2 times per day    piperacillin-tazobactam  3,375 mg IntraVENous Q12H    ipratropium-albuterol  1 ampule Inhalation Q4H    dexamethasone  6 mg IntraVENous Q24H    enoxaparin  30 mg SubCUTAneous Daily    fluconazole  200 mg IntraVENous Q24H    polyethylene glycol  17 g Per G Tube BID    docusate  100 mg Per G Tube BID    [Held by provider] fluconazole  200 mg Per G Tube Daily    metoprolol tartrate  50 mg Oral TID    pantoprazole  40 mg IntraVENous Daily    aspirin  81 mg Oral BID       sodium chloride      sodium chloride 75 mL/hr at 07/25/22 2143    sodium chloride 25 mL (07/24/22 2306)     sodium chloride flush, sodium chloride, heparin flush, bisacodyl, sodium chloride flush, sodium chloride      REVIEW OF SYSTEMS:  Patient is unable to answer questions regarding review of systems because of her hypoxemia and confusion  OBJECTIVE:  Vitals:    07/26/22 0800   BP: 103/69   Pulse: 86   Resp: 19   Temp: 97.6 °F (36.4 °C)   SpO2: 91%     FiO2 : 70 %  O2 Flow Rate (L/min): 2 L/min  O2 Device: Heated high flow cannula    PHYSICAL EXAM:  Constitutional: No fever, chills, diaphoresis  Skin: No skin rash, no skin breakdown  HEENT: Unremarkable  Neck: No JVD, lymphadenopathy, thyromegaly  Cardiovascular: S1, S2 somewhat irregular. Grade 2/6 systolic ejection murmur left sternal border. I do not hear any S3  Respiratory: Diffuse inspiratory crackles and expiratory wheezes throughout all lung fields  Gastrointestinal: Soft, flat, nontender. PEG tube is in place  Genitourinary: No grossly bloody urine  Extremities: No clubbing, cyanosis, or edema  Neurological: Somewhat confused but will occasionally answer and follow commands.   Moves all extremities  Psychological: cannot evaluate    LABS:  WBC   Date Value Ref Range Status   07/26/2022 23.4 (H) 4.5 - 11.5 E9/L Final   07/25/2022 25.2 (H) 4.5 - 11.5 E9/L Final   07/24/2022 21.6 (H) 4.5 - 11.5 E9/L Final     Hemoglobin   Date Value Ref Range Status   07/26/2022 7.5 (L) 11.5 - 15.5 g/dL Final   07/25/2022 7.4 (L) 11.5 - 15.5 g/dL Final   07/25/2022 7.3 (L) 11.5 - 15.5 g/dL Final     Hematocrit   Date Value Ref Range Status   07/26/2022 26.3 (L) 34.0 - 48.0 % Final   07/25/2022 24.1 (L) 34.0 - 48.0 % Final   07/24/2022 27.6 (L) 34.0 - 48.0 % Final     MCV   Date Value Ref Range Status   07/26/2022 103.1 (H) 80.0 - 99.9 fL Final   07/25/2022 100.4 (H) 80.0 - 99.9 fL Final   07/24/2022 100.4 (H) 80.0 - 99.9 fL Final     Platelets   Date Value Ref Range Status   07/26/2022 510 (H) 130 - 450 E9/L Final   07/25/2022 450 130 - 450 E9/L Final   07/24/2022 461 (H) 130 - 450 E9/L Final     Sodium   Date Value Ref Range Status   07/26/2022 141 132 - 146 mmol/L Final   07/25/2022 138 132 - 146 mmol/L Final   07/25/2022 140 132 - 146 mmol/L Final Potassium   Date Value Ref Range Status   07/26/2022 5.8 (H) 3.5 - 5.0 mmol/L Final   07/25/2022 5.7 (H) 3.5 - 5.0 mmol/L Final   07/25/2022 6.1 (H) 3.5 - 5.0 mmol/L Final     Chloride   Date Value Ref Range Status   07/26/2022 104 98 - 107 mmol/L Final   07/25/2022 102 98 - 107 mmol/L Final   07/25/2022 102 98 - 107 mmol/L Final     CO2   Date Value Ref Range Status   07/26/2022 30 (H) 22 - 29 mmol/L Final   07/25/2022 27 22 - 29 mmol/L Final   07/25/2022 30 (H) 22 - 29 mmol/L Final     BUN   Date Value Ref Range Status   07/26/2022 74 (H) 6 - 23 mg/dL Final   07/25/2022 72 (H) 6 - 23 mg/dL Final   07/25/2022 68 (H) 6 - 23 mg/dL Final     Creatinine   Date Value Ref Range Status   07/26/2022 1.1 (H) 0.5 - 1.0 mg/dL Final   07/25/2022 1.1 (H) 0.5 - 1.0 mg/dL Final   07/25/2022 1.1 (H) 0.5 - 1.0 mg/dL Final     Glucose   Date Value Ref Range Status   07/26/2022 138 (H) 74 - 99 mg/dL Final   07/25/2022 145 (H) 74 - 99 mg/dL Final   07/25/2022 153 (H) 74 - 99 mg/dL Final     Calcium   Date Value Ref Range Status   07/26/2022 8.6 8.6 - 10.2 mg/dL Final   07/25/2022 8.1 (L) 8.6 - 10.2 mg/dL Final   07/25/2022 8.6 8.6 - 10.2 mg/dL Final     Total Protein   Date Value Ref Range Status   07/26/2022 5.3 (L) 6.4 - 8.3 g/dL Final   07/25/2022 5.5 (L) 6.4 - 8.3 g/dL Final   07/25/2022 5.4 (L) 6.4 - 8.3 g/dL Final     Albumin   Date Value Ref Range Status   07/26/2022 2.3 (L) 3.5 - 5.2 g/dL Final   07/25/2022 2.3 (L) 3.5 - 5.2 g/dL Final   07/25/2022 2.3 (L) 3.5 - 5.2 g/dL Final     Total Bilirubin   Date Value Ref Range Status   07/26/2022 0.7 0.0 - 1.2 mg/dL Final   07/25/2022 0.6 0.0 - 1.2 mg/dL Final   07/25/2022 0.6 0.0 - 1.2 mg/dL Final     Alkaline Phosphatase   Date Value Ref Range Status   07/26/2022 81 35 - 104 U/L Final   07/25/2022 81 35 - 104 U/L Final   07/25/2022 80 35 - 104 U/L Final     AST   Date Value Ref Range Status   07/26/2022 24 0 - 31 U/L Final   07/25/2022 28 0 - 31 U/L Final   07/25/2022 29 0 - 31 U/L Final     ALT   Date Value Ref Range Status   07/26/2022 15 0 - 32 U/L Final   07/25/2022 16 0 - 32 U/L Final   07/25/2022 15 0 - 32 U/L Final     GFR Non-   Date Value Ref Range Status   07/26/2022 46 >=60 mL/min/1.73 Final     Comment:     Chronic Kidney Disease: less than 60 ml/min/1.73 sq.m. Kidney Failure: less than 15 ml/min/1.73 sq.m. Results valid for patients 18 years and older. 07/25/2022 46 >=60 mL/min/1.73 Final     Comment:     Chronic Kidney Disease: less than 60 ml/min/1.73 sq.m. Kidney Failure: less than 15 ml/min/1.73 sq.m. Results valid for patients 18 years and older. 07/25/2022 46 >=60 mL/min/1.73 Final     Comment:     Chronic Kidney Disease: less than 60 ml/min/1.73 sq.m. Kidney Failure: less than 15 ml/min/1.73 sq.m. Results valid for patients 18 years and older. GFR    Date Value Ref Range Status   07/26/2022 56  Final   07/25/2022 56  Final   07/25/2022 56  Final     Magnesium   Date Value Ref Range Status   07/26/2022 2.7 (H) 1.6 - 2.6 mg/dL Final     Phosphorus   Date Value Ref Range Status   07/26/2022 4.4 2.5 - 4.5 mg/dL Final     Recent Labs     07/26/22  0503   PH 7.439   PO2 84.6   PCO2 41.9   HCO3 27.8*   BE 3.3*   O2SAT 95.2       RADIOLOGY:  XR CHEST PORTABLE   Final Result   No significant interval change of the past 15 hours. XR CHEST PORTABLE   Final Result   Stable bilateral pulmonary opacities. Possible right pleural effusion. XR CHEST PORTABLE   Final Result   Increasing airspace disease and volume loss in left hemithorax suspicious at   least partial left lower lobe collapse with mucous plugging. Pulmonary consultation is recommended. XR CHEST PORTABLE   Final Result   Increasing bilateral airspace disease with bilateral pleural effusions and   moderate cardiomegaly.          XR HIP RIGHT (2-3 VIEWS)   Final Result   It intact right femoral gamma nail with near anatomic alignment of femoral   fracture fragments and severe right hip osteoarthritis. CT HEAD WO CONTRAST   Final Result   No acute intracranial abnormality. Chronic microvascular ischemic changes. Small old lacunar infarcts. FL MODIFIED BARIUM SWALLOW W VIDEO   Final Result   Swallowing dysfunction as described above including aspiration of thin liquid   barium and barium nectar. Please see separate speech pathology report for full discussion of findings   and recommendations. Fluoro For Surgical Procedures   Final Result   Intraprocedural fluoroscopic spot images as above. See separate procedure   report for more information. XR CHEST PORTABLE   Final Result   Bilateral pulmonary opacities, left greater than right, likely   infectious/inflammatory. Aspiration pneumonitis would also be in the   differential diagnosis. XR HIP RIGHT (1 VIEW)   Final Result      Intertrochanteric fracture of the proximal right femur. XR FEMUR RIGHT (MIN 2 VIEWS)   Final Result   Intertrochanteric fracture of the proximal right femur         XR HIP RIGHT (2-3 VIEWS)   Final Result   Displaced right intertrochanteric fracture with varus deformity. .         XR CHEST 1 VIEW   Final Result   No acute process. CT HEAD WO CONTRAST   Final Result   No acute intracranial abnormality. CT CERVICAL SPINE WO CONTRAST   Final Result   No acute abnormality of the cervical spine. Multilevel degenerative changes. XR CHEST PORTABLE    (Results Pending)   XR CHEST PORTABLE    (Results Pending)           PROBLEM LIST:  Principal Problem:    Hip fracture requiring operative repair, left, open type I or II, initial encounter ([address]  Active Problems:    Severe protein-calorie malnutrition ([address]    Acute encephalopathy    Acute hypoxemic respiratory failure due to COVID-19 [facility])    Aspiration pneumonia ([facility] [address]  Resolved Problems:    * No resolved hospital problems. *      IMPRESSION:  Acute hypoxemic respiratory failure  Extensive bilateral lung infiltrates  MANA  Hyperkalemia  Status post right hip ORIF  Protein calorie malnutrition  Likely sepsis from pneumonia and/or other sites/sources  Status post bronchoscopy 7/25/2022    PLAN:  Supportive respiratory care with inhaled bronchodilators and inhaled steroids and frequent suctioning complemented by heated high flow nasal cannula oxygen and nonrebreather as well as BiPAP when needed  IV antibiotics-add Eraxis  IV fluid  May need to treat hyperkalemia with Lokelma  Tube feedings at reduced amounts so she does not aspirate  PICC line today per PICC team    ATTESTATION:  ICU Staff Physician note of personal involvement in Care  As the attending physician, I certify that I personally reviewed the patients history and personally examined the patient to confirm the physical findings described above,  And that I reviewed the relevant imaging studies and available reports. I also discussed the differential diagnosis and all of the proposed management plans with the patient and individuals accompanying the patient to this visit. They had the opportunity to ask questions about the proposed management plans and to have those questions answered. This patient has a high probability of sudden, clinically significant deterioration, which requires the highest level of physician preparedness to intervene urgently. I managed/supervised life or organ supporting interventions that required frequent physician assessment. I devoted my full attention to the direct care of this patient for the amount of time indicated below. Time I spent with the family or surrogate(s) is included only if the patient was incapable of providing the necessary information or participating in medical decisions - Time devoted to teaching and to any procedures I billed separately is not included.     CRITICAL CARE TIME:  39 minutes    Electronically signed by Graciela Gunter MD on 7/26/2022 at 11:12 AM

## 2022-07-26 NOTE — PROGRESS NOTES
Date:2022  Patient Name: Mell Gibson  MRN: [id number]  : 10/17/1928  ROOM #: [id number]/IC10-01    Occupational Therapy on hold due to transfer to ICU due to respiratory distress; please reorder OT Eval and Treat when the patient is able to participate in therapy. Thank you.      Emily Vargas OTR/L #[id number]

## 2022-07-26 NOTE — PLAN OF CARE
Problem: Pain  Goal: Verbalizes/displays adequate comfort level or baseline comfort level  Outcome: Progressing Towards Goal     Problem: Safety - Adult  Goal: Free from fall injury  Outcome: Progressing Towards Goal     Problem: ABCDS Injury Assessment  Goal: Absence of physical injury  Outcome: Progressing Towards Goal     Problem: Skin/Tissue Integrity  Goal: Absence of new skin breakdown  Description: 1. Monitor for areas of redness and/or skin breakdown  2. Assess vascular access sites hourly  3. Every 4-6 hours minimum:  Change oxygen saturation probe site  4. Every 4-6 hours:  If on nasal continuous positive airway pressure, respiratory therapy assess nares and determine need for appliance change or resting period.   Outcome: Progressing Towards Goal     Problem: Discharge Planning  Goal: Discharge to home or other facility with appropriate resources  Outcome: Progressing Towards Goal     Problem: Nutrition Deficit:  Goal: Optimize nutritional status  Outcome: Progressing Towards Goal

## 2022-07-26 NOTE — PROGRESS NOTES
Department of Internal Medicine  General Internal Medicine  Attending Progress Note  Chief Complaint   Patient presents with    Fall     SUBJECTIVE:    Transferred to ICU due to worsening clinical condition.      OBJECTIVE      Medications    Current Facility-Administered Medications: acetylcysteine (MUCOMYST) 10 % solution 400 mg, 4 mL, Inhalation, Q4H  sodium chloride flush 0.9 % injection 5-40 mL, 5-40 mL, IntraVENous, 2 times per day  sodium chloride flush 0.9 % injection 5-40 mL, 5-40 mL, IntraVENous, PRN  0.9 % sodium chloride infusion, , IntraVENous, PRN  heparin flush 100 UNIT/ML injection 300 Units, 3 mL, IntraVENous, 2 times per day  heparin flush 100 UNIT/ML injection 300 Units, 3 mL, IntraCATHeter, PRN  anidulafungin (ERAXIS) 200 mg in dextrose 5 % 260 mL IVPB, 200 mg, IntraVENous, Once **AND** [START ON 7/27/2022] anidulafungin (ERAXIS) 100 mg in dextrose 5 % 130 mL IVPB, 100 mg, IntraVENous, Q24H  vancomycin (VANCOCIN) 1,000 mg in sodium chloride 0.9 % 250 mL IVPB ([id number]), 1,000 mg, IntraVENous, Once  heparin (porcine) injection 5,000 Units, 5,000 Units, SubCUTAneous, BID  piperacillin-tazobactam (ZOSYN) 3,375 mg in dextrose 5 % 50 mL IVPB extended infusion (mini-bag), 3,375 mg, IntraVENous, Q12H  ipratropium-albuterol (DUONEB) nebulizer solution 1 ampule, 1 ampule, Inhalation, Q4H  0.9 % sodium chloride infusion, , IntraVENous, Continuous  dexamethasone (DECADRON) injection 6 mg, 6 mg, IntraVENous, Q24H  polyethylene glycol (GLYCOLAX) packet 17 g, 17 g, Per G Tube, BID  bisacodyl (DULCOLAX) suppository 10 mg, 10 mg, Rectal, Daily PRN  docusate (COLACE) 50 MG/5ML liquid 100 mg, 100 mg, Per G Tube, BID  metoprolol tartrate (LOPRESSOR) tablet 50 mg, 50 mg, Oral, TID  sodium chloride flush 0.9 % injection 5-40 mL, 5-40 mL, IntraVENous, PRN  0.9 % sodium chloride infusion, 25 mL, IntraVENous, PRN  pantoprazole (PROTONIX) injection 40 mg, 40 mg, IntraVENous, Daily  aspirin chewable tablet 81 mg, 81 mg, Oral, BID WC  Physical    VITALS:  /64   Pulse 92   Temp 97.6 °F (36.4 °C) (Axillary)   Resp (!) 35   Ht 5' 2\" (1.575 m)   Wt 110 lb (49.9 kg)   SpO2 94%   BMI 20.12 kg/m²   CONSTITUTIONAL:  awake   EYES:  extra-ocular muscles intact and vision intact  ENT:  normocepalic, without obvious abnormality  NECK:  supple, symmetrical, trachea midline  LUNGS:  no increased work of breathing, no retractions, and no crackles or wheezing  CARDIOVASCULAR:  normal apical pulses and normal S1 and S2  ABDOMEN:  normal bowel sounds, non-distended, and non-tender  MUSCULOSKELETAL:  there is no redness, warmth, or swelling of the joints  NEUROLOGIC:    SKIN:  no bruising or bleeding  Data    CBC:   Lab Results   Component Value Date/Time    WBC 23.4 07/26/2022 05:31 AM    RBC 2.55 07/26/2022 05:31 AM    HGB 7.5 07/26/2022 05:31 AM    HCT 26.3 07/26/2022 05:31 AM    .1 07/26/2022 05:31 AM    MCH 29.4 07/26/2022 05:31 AM    MCHC 28.5 07/26/2022 05:31 AM    RDW 16.6 07/26/2022 05:31 AM     07/26/2022 05:31 AM    MPV 10.3 07/26/2022 05:31 AM     BMP:    Lab Results   Component Value Date/Time     07/26/2022 11:59 AM    K 5.4 07/26/2022 11:59 AM     07/26/2022 11:59 AM    CO2 30 07/26/2022 11:59 AM    BUN 74 07/26/2022 11:59 AM    LABALBU 2.3 07/26/2022 05:31 AM    CREATININE 1.1 07/26/2022 11:59 AM    CALCIUM 8.4 07/26/2022 11:59 AM    GFRAA 56 07/26/2022 11:59 AM    LABGLOM 46 07/26/2022 11:59 AM    GLUCOSE 127 07/26/2022 11:59 AM       ASSESSMENT AND PLAN      Principal Problem:  1. Hip fracture requiring operative repair,     2. Severe protein-calorie malnutrition ([address]    3. Acute encephalopathy    4. Acute hypoxemic respiratory failure due to COVID-19 ([facility] [address]    5. Aspiration pneumonia ([facility] [address]    6. Acute blood loss anemia:    7. Hyperkalemia:    Plans:  Continue abx per ID  On supplemental oxygen  Wean off if able to tolerate  Continue treatment for Hyperkalemia.   Overall K is improving  Continue to monitor  Noted Bronch per Pulm note.

## 2022-07-27 NOTE — PROGRESS NOTES
Department of Internal Medicine  General Internal Medicine  Attending Progress Note  Chief Complaint   Patient presents with    Fall     SUBJECTIVE:    On Monica Rao.  Non verbal.     OBJECTIVE      Medications    Current Facility-Administered Medications: vancomycin (VANCOCIN) oral solution 250 mg, 250 mg, Oral, 4 times per day  acetylcysteine (MUCOMYST) 10 % solution 400 mg, 4 mL, Inhalation, TID  vancomycin (VANCOCIN) 1,000 mg in sodium chloride 0.9 % 250 mL IVPB ([id number]), 1,000 mg, IntraVENous, Once  metoprolol tartrate (LOPRESSOR) tablet 25 mg, 25 mg, Oral, BID  sodium chloride flush 0.9 % injection 5-40 mL, 5-40 mL, IntraVENous, 2 times per day  sodium chloride flush 0.9 % injection 5-40 mL, 5-40 mL, IntraVENous, PRN  0.9 % sodium chloride infusion, , IntraVENous, PRN  heparin flush 100 UNIT/ML injection 300 Units, 3 mL, IntraVENous, 2 times per day  heparin flush 100 UNIT/ML injection 300 Units, 3 mL, IntraCATHeter, PRN  [COMPLETED] anidulafungin (ERAXIS) 200 mg in dextrose 5 % 260 mL IVPB, 200 mg, IntraVENous, Once **AND** anidulafungin (ERAXIS) 100 mg in dextrose 5 % 130 mL IVPB, 100 mg, IntraVENous, Q24H  heparin (porcine) injection 5,000 Units, 5,000 Units, SubCUTAneous, BID  vancomycin (VANCOCIN) intermittent dosing (placeholder), , Other, RX Placeholder  dexmedetomidine (PRECEDEX) 400 mcg in sodium chloride 0.9 % 100 mL infusion, 0.1-1.5 mcg/kg/hr, IntraVENous, Continuous  piperacillin-tazobactam (ZOSYN) 3,375 mg in dextrose 5 % 50 mL IVPB extended infusion (mini-bag), 3,375 mg, IntraVENous, Q12H  ipratropium-albuterol (DUONEB) nebulizer solution 1 ampule, 1 ampule, Inhalation, Q4H  0.9 % sodium chloride infusion, , IntraVENous, Continuous  dexamethasone (DECADRON) injection 6 mg, 6 mg, IntraVENous, Q24H  polyethylene glycol (GLYCOLAX) packet 17 g, 17 g, Per G Tube, BID  bisacodyl (DULCOLAX) suppository 10 mg, 10 mg, Rectal, Daily PRN  sodium chloride flush 0.9 % injection 5-40 mL, 5-40 mL, IntraVENous, PRN  0.9 % sodium chloride infusion, 25 mL, IntraVENous, PRN  pantoprazole (PROTONIX) injection 40 mg, 40 mg, IntraVENous, Daily  aspirin chewable tablet 81 mg, 81 mg, Oral, BID WC  Physical    VITALS:  BP (!) 131/58   Pulse 62   Temp 97.5 °F (36.4 °C) (Axillary)   Resp 29   Ht 5' 2\" (1.575 m)   Wt 110 lb (49.9 kg)   SpO2 98%   BMI 20.12 kg/m²   CONSTITUTIONAL:  alert  EYES:  extra-ocular muscles intact and vision intact  ENT:  normocepalic, without obvious abnormality  BACK:  symmetric  LUNGS:  no increased work of breathing, no retractions, and no crackles or wheezing  CARDIOVASCULAR:  normal apical pulses and normal S1 and S2  ABDOMEN:  normal bowel sounds, non-distended, and non-tender  MUSCULOSKELETAL:  there is no redness, warmth, or swelling of the joints  SKIN:  no bruising or bleeding  Data    CBC:   Lab Results   Component Value Date/Time    WBC 18.2 07/27/2022 04:48 AM    RBC 2.38 07/27/2022 04:48 AM    HGB 7.1 07/27/2022 04:48 AM    HCT 23.4 07/27/2022 04:48 AM    MCV 98.3 07/27/2022 04:48 AM    MCH 29.8 07/27/2022 04:48 AM    MCHC 30.3 07/27/2022 04:48 AM    RDW 16.8 07/27/2022 04:48 AM     07/27/2022 04:48 AM    MPV 10.3 07/27/2022 04:48 AM     BMP:    Lab Results   Component Value Date/Time     07/27/2022 04:48 AM    K 5.0 07/27/2022 04:48 AM     07/27/2022 04:48 AM    CO2 28 07/27/2022 04:48 AM    BUN 77 07/27/2022 04:48 AM    LABALBU 2.3 07/27/2022 04:48 AM    CREATININE 1.0 07/27/2022 04:48 AM    CALCIUM 8.3 07/27/2022 04:48 AM    GFRAA >60 07/27/2022 04:48 AM    LABGLOM 52 07/27/2022 04:48 AM    GLUCOSE 185 07/27/2022 04:48 AM       ASSESSMENT AND PLAN        1. Hip fracture requiring operative repair, left, open type I or II, initial encounter     2. Severe protein-calorie malnutrition     3. Acute encephalopathy:    4. Acute hypoxemic respiratory failure due to COVID-19:    5. Aspiration pneumonia:    6. Anemia due to blood loss:    7. Hyperkalemia:    Plans:  Continue current abx   Continue K depleting cocktails as needed  Continue tube feeding  Continue supplemental oxygen and wean off as tolerated.   On antifungal

## 2022-07-27 NOTE — PLAN OF CARE
Problem: Pain  Goal: Verbalizes/displays adequate comfort level or baseline comfort level  Outcome: Not Progressing Towards Goal     Problem: Confusion  Goal: Confusion, delirium, dementia, or psychosis is improved or at baseline  Description: INTERVENTIONS:  1. Assess for possible contributors to thought disturbance, including medications, impaired vision or hearing, underlying metabolic abnormalities, dehydration, psychiatric diagnoses, and notify attending LIP  2. [place] high risk fall precautions, as indicated  3. Provide frequent short contacts to provide reality reorientation, refocusing and direction  4. Decrease environmental stimuli, including noise as appropriate  5. Monitor and intervene to maintain adequate nutrition, hydration, elimination, sleep and activity  6. If unable to ensure safety without constant attention obtain sitter and review sitter guidelines with assigned personnel  7. Initiate Psychosocial CNS and Spiritual Care consult, as indicated  Outcome: Not Progressing Towards Goal     Problem: Safety - Adult  Goal: Free from fall injury  Outcome: Progressing Towards Goal     Problem: ABCDS Injury Assessment  Goal: Absence of physical injury  Outcome: Progressing Towards Goal     Problem: Skin/Tissue Integrity  Goal: Absence of new skin breakdown  Description: 1. Monitor for areas of redness and/or skin breakdown  2. Assess vascular access sites hourly  3. Every 4-6 hours minimum:  Change oxygen saturation probe site  4. Every 4-6 hours:  If on nasal continuous positive airway pressure, respiratory therapy assess nares and determine need for appliance change or resting period.   Outcome: Progressing Towards Goal     Problem: Discharge Planning  Goal: Discharge to home or other facility with appropriate resources  Outcome: Progressing Towards Goal  Flowsheets (Taken 7/27/2022 5144)  Discharge to home or other facility with appropriate resources:   Identify barriers to discharge with patient and caregiver   Identify discharge learning needs (meds, wound care, etc)   Arrange for needed discharge resources and transportation as appropriate   Refer to discharge planning if patient needs post-hospital services based on physician order or complex needs related to functional status, cognitive ability or social support system     Problem: Nutrition Deficit:  Goal: Optimize nutritional status  Outcome: Progressing Towards Goal     Problem: Pain  Goal: Verbalizes/displays adequate comfort level or baseline comfort level  Outcome: Not Progressing Towards Goal     Problem: Confusion  Goal: Confusion, delirium, dementia, or psychosis is improved or at baseline  Description: INTERVENTIONS:  1. Assess for possible contributors to thought disturbance, including medications, impaired vision or hearing, underlying metabolic abnormalities, dehydration, psychiatric diagnoses, and notify attending LIP  2. [place] high risk fall precautions, as indicated  3. Provide frequent short contacts to provide reality reorientation, refocusing and direction  4. Decrease environmental stimuli, including noise as appropriate  5. Monitor and intervene to maintain adequate nutrition, hydration, elimination, sleep and activity  6. If unable to ensure safety without constant attention obtain sitter and review sitter guidelines with assigned personnel  7.  Initiate Psychosocial CNS and Spiritual Care consult, as indicated  Outcome: Not Progressing Towards Goal

## 2022-07-27 NOTE — PROGRESS NOTES
Spondylosis, lumbosacral    Lumbar facet arthropathy    Lumbar spinal stenosis    Osteoarthrosis, hip    Knee pain    Facet syndrome right C2-C6     Degenerative osteoarthrosis right C2-C6    Protruded cervical disc    Cervical radiculopathy    Neural foraminal stenosis of lumbar spine    Neural foraminal stenosis of cervical spine    Osteoarthritis of lumbar spine, degenerative with facet syndrome    Hip fracture requiring operative repair, left, open type I or II, initial encounter ([facility]    Severe protein-calorie malnutrition ([facility]    Acute encephalopathy    Acute hypoxemic respiratory failure due to COVID-19 ([facility]    Aspiration pneumonia ([facility]     1. Dysphagia/Aspiration   -S/P PEG 7/18  -Tube feedings per admitting service     2. Anemia   -New anemia first noted in July of this year  -Iron deficient/normocytic  -H&H appears stabilized   -No evidence of overt bleed  -Pending FOBT   -See discussion below regarding endoscopies     3. Constipation  -Family reports bowel movement  -Laxatives/stool softeners via PEG tube     Decreased H&H without clear evidence of overt bleed. FOBT pending  Patient remains in significant respiratory failure requiring noninvasive positive pressure ventilation. At this time patient will be at extremely high risk for further respiratory decompensation and likely will require to be intubated and mechanically ventilated for procedure. Patient is very likely to have prolonged ventilator course. Given patient advanced age, comorbidities, respiratory failure which places her at very high risk for adverse events with endoscopy/sedation, will hold off colonoscopy at this time unless overt bleed with precipitous drop in H&H - in this case patient will need to be further discussed with her family/DPOA in regards to goals of care. Given patient overall condition/age, further de-escalation of treatment may be warranted. Defer discussion to admitting/CCM.      Mack Reeves, MD  7/27/2022  10:11 AM    NOTE:  This report was transcribed using voice recognition software. Every effort was made to ensure accuracy; however, inadvertent computerized transcription errors may be present.

## 2022-07-27 NOTE — PROGRESS NOTES
Pharmacy Consultation Note  (Antibiotic Dosing and Monitoring)    Initial consult date: 7/26  Consulting physician/provider: Maryse Zamora  Drug: Vancomycin  Indication: Pneumonia    Age/  Gender Height Weight IBW  Allergy Information   89 y.o./female 5' 2\" (157.5 cm) 98 lb (44.5 kg)     Ideal body weight: 50.1 kg (110 lb 7.2 oz)   Biaxin [clarithromycin] and Floxin [ofloxacin]      Renal Function:  Recent Labs     07/26/22  0531 07/26/22  1159 07/27/22  0448   BUN 74* 74* 77*   CREATININE 1.1* 1.1* 1.0         Intake/Output Summary (Last 24 hours) at 7/27/2022 1124  Last data filed at 7/27/2022 0800  Gross per 24 hour   Intake 1494.07 ml   Output 100 ml   Net 1394.07 ml         Vancomycin Monitoring:  Trough:  No results for input(s): VANCOTROUGH in the last 72 hours. Random:    Recent Labs     07/25/22  1421 07/27/22  0809   VANCORANDOM 15.2 14.5         Vancomycin Administration Times:  Recent vancomycin administrations                     vancomycin (VANCOCIN) 1,000 mg in sodium chloride 0.9 % 250 mL IVPB ([id number]) (mg) 1,000 mg New Bag 07/24/22 1241    vancomycin (VANCOCIN) 1,000 mg in sodium chloride 0.9 % 250 mL IVPB ([id number]) (mg) 1,000 mg New Bag 07/23/22 1348                    Assessment:  Patient is a 80 y.o. female who has been initiated on vancomycin  Estimated Creatinine Clearance: 28 mL/min (based on SCr of 1 mg/dL). To dose vancomycin, pharmacy will be utilizing dosing based off of levels because of patient's renal impairment/insufficiency  7/26: Scr 1.1, increased from baseline (0.6mcg/mL)  7/27: scr 1.0, vanco level 14.5    Plan:   Will give vancomycin 1000 mg IV once  Will check vancomycin level 7/28 with am labs  Will continue to monitor renal function   Clinical pharmacy to follow      Parker Frye [facility] 7/27/2022 11:24 AM

## 2022-07-27 NOTE — PROGRESS NOTES
chloride      REVIEW OF SYSTEMS:  Constitutional: Denies fever, weight loss, night sweats, and fatigue  Skin: Denies pigmentation, dark lesions, and rashes   HEENT: Denies hearing loss, tinnitus, ear drainage, epistaxis, sore throat, and hoarseness. Cardiovascular: Denies palpitations, chest pain, and chest pressure. Respiratory: Denies cough, dyspnea at rest, hemoptysis, apnea, and choking.   Gastrointestinal: Denies nausea, vomiting, poor appetite, diarrhea, heartburn or reflux  Genitourinary: Denies dysuria, frequency, urgency or hematuria  Musculoskeletal: Denies myalgias, muscle weakness, and bone pain  Neurological: Denies dizziness, vertigo, headache, and focal weakness  Psychological: Denies anxiety and depression  Endocrine: Denies heat intolerance and cold intolerance  Hematopoietic/Lymphatic: Denies bleeding problems and blood transfusions    OBJECTIVE:  Vitals:    07/27/22 1000   BP: (!) 90/41   Pulse: 50   Resp: 23   Temp:    SpO2: 96%     FiO2 : 70 %  O2 Flow Rate (L/min): 60 L/min  O2 Device: PAP (positive airway pressure)    LABS:  WBC   Date Value Ref Range Status   07/27/2022 18.2 (H) 4.5 - 11.5 E9/L Final   07/26/2022 23.4 (H) 4.5 - 11.5 E9/L Final   07/25/2022 25.2 (H) 4.5 - 11.5 E9/L Final     Hemoglobin   Date Value Ref Range Status   07/27/2022 7.1 (L) 11.5 - 15.5 g/dL Final   07/26/2022 7.5 (L) 11.5 - 15.5 g/dL Final   07/25/2022 7.4 (L) 11.5 - 15.5 g/dL Final     Hematocrit   Date Value Ref Range Status   07/27/2022 23.4 (L) 34.0 - 48.0 % Final   07/26/2022 26.3 (L) 34.0 - 48.0 % Final   07/25/2022 24.1 (L) 34.0 - 48.0 % Final     MCV   Date Value Ref Range Status   07/27/2022 98.3 80.0 - 99.9 fL Final   07/26/2022 103.1 (H) 80.0 - 99.9 fL Final   07/25/2022 100.4 (H) 80.0 - 99.9 fL Final     Platelets   Date Value Ref Range Status   07/27/2022 451 (H) 130 - 450 E9/L Final   07/26/2022 510 (H) 130 - 450 E9/L Final   07/25/2022 450 130 - 450 E9/L Final         MEDICATIONS:   vancomycin  250 mg Oral 4 times per day    acetylcysteine  4 mL Inhalation Q4H    sodium chloride flush  5-40 mL IntraVENous 2 times per day    heparin flush  3 mL IntraVENous 2 times per day    anidulafungin  100 mg IntraVENous Q24H    heparin (porcine)  5,000 Units SubCUTAneous BID    vancomycin (VANCOCIN) intermittent dosing (placeholder)   Other RX Placeholder    piperacillin-tazobactam  3,375 mg IntraVENous Q12H    ipratropium-albuterol  1 ampule Inhalation Q4H    dexamethasone  6 mg IntraVENous Q24H    polyethylene glycol  17 g Per G Tube BID    docusate  100 mg Per G Tube BID    metoprolol tartrate  50 mg Oral TID    pantoprazole  40 mg IntraVENous Daily    aspirin  81 mg Oral BID WC      sodium chloride      dexmedetomidine HCl in NaCl 0.2 mcg/kg/hr (07/27/22 0700)    sodium chloride 75 mL/hr at 07/27/22 0700    sodium chloride 25 mL (07/24/22 2306)     sodium chloride flush, sodium chloride, heparin flush, bisacodyl, sodium chloride flush, sodium chloride      REVIEW OF SYSTEMS:  Patient is unable to answer questions regarding review of systems because she is on BiPAP and is confused  OBJECTIVE:  Vitals:    07/27/22 1000   BP: (!) 90/41   Pulse: 50   Resp: 23   Temp:    SpO2: 96%     FiO2 : 70 %  O2 Flow Rate (L/min): 60 L/min  O2 Device: PAP (positive airway pressure)    PHYSICAL EXAM:  Constitutional: No fever, chills, diaphoresis  Skin: No skin rash, no skin breakdown  HEENT: Unremarkable  Neck: No JVD, lymphadenopathy, thyromegaly  Cardiovascular: S1, S2 regular. No S3 or rubs present  Respiratory: Diffuse crackles and wheezes throughout both lung fields both anteriorly and posteriorly  Gastrointestinal: Soft, flat, nontender.   PEG tube is in place  Genitourinary: No grossly bloody urine  Extremities: No clubbing, cyanosis, or edema  Neurological: Patient does try to talk and is sometimes agitated but less so on Precedex  Psychological: Difficult to assess    LABS:  WBC   Date Value Ref Range Status   07/27/2022 18.2 (H) 4.5 - 11.5 E9/L Final   07/26/2022 23.4 (H) 4.5 - 11.5 E9/L Final   07/25/2022 25.2 (H) 4.5 - 11.5 E9/L Final     Hemoglobin   Date Value Ref Range Status   07/27/2022 7.1 (L) 11.5 - 15.5 g/dL Final   07/26/2022 7.5 (L) 11.5 - 15.5 g/dL Final   07/25/2022 7.4 (L) 11.5 - 15.5 g/dL Final     Hematocrit   Date Value Ref Range Status   07/27/2022 23.4 (L) 34.0 - 48.0 % Final   07/26/2022 26.3 (L) 34.0 - 48.0 % Final   07/25/2022 24.1 (L) 34.0 - 48.0 % Final     MCV   Date Value Ref Range Status   07/27/2022 98.3 80.0 - 99.9 fL Final   07/26/2022 103.1 (H) 80.0 - 99.9 fL Final   07/25/2022 100.4 (H) 80.0 - 99.9 fL Final     Platelets   Date Value Ref Range Status   07/27/2022 451 (H) 130 - 450 E9/L Final   07/26/2022 510 (H) 130 - 450 E9/L Final   07/25/2022 450 130 - 450 E9/L Final     Sodium   Date Value Ref Range Status   07/27/2022 139 132 - 146 mmol/L Final   07/26/2022 142 132 - 146 mmol/L Final   07/26/2022 141 132 - 146 mmol/L Final     Potassium   Date Value Ref Range Status   07/27/2022 5.0 3.5 - 5.0 mmol/L Final   07/26/2022 5.4 (H) 3.5 - 5.0 mmol/L Final   07/26/2022 5.8 (H) 3.5 - 5.0 mmol/L Final     Chloride   Date Value Ref Range Status   07/27/2022 103 98 - 107 mmol/L Final   07/26/2022 104 98 - 107 mmol/L Final   07/26/2022 104 98 - 107 mmol/L Final     CO2   Date Value Ref Range Status   07/27/2022 28 22 - 29 mmol/L Final   07/26/2022 30 (H) 22 - 29 mmol/L Final   07/26/2022 30 (H) 22 - 29 mmol/L Final     BUN   Date Value Ref Range Status   07/27/2022 77 (H) 6 - 23 mg/dL Final   07/26/2022 74 (H) 6 - 23 mg/dL Final   07/26/2022 74 (H) 6 - 23 mg/dL Final     Creatinine   Date Value Ref Range Status   07/27/2022 1.0 0.5 - 1.0 mg/dL Final   07/26/2022 1.1 (H) 0.5 - 1.0 mg/dL Final   07/26/2022 1.1 (H) 0.5 - 1.0 mg/dL Final     Glucose   Date Value Ref Range Status   07/27/2022 185 (H) 74 - 99 mg/dL Final   07/26/2022 127 (H) 74 - 99 mg/dL Final   07/26/2022 138 (H) 74 - 99 mg/dL Final     Calcium Date Value Ref Range Status   07/27/2022 8.3 (L) 8.6 - 10.2 mg/dL Final   07/26/2022 8.4 (L) 8.6 - 10.2 mg/dL Final   07/26/2022 8.6 8.6 - 10.2 mg/dL Final     Total Protein   Date Value Ref Range Status   07/27/2022 5.1 (L) 6.4 - 8.3 g/dL Final   07/26/2022 5.3 (L) 6.4 - 8.3 g/dL Final   07/25/2022 5.5 (L) 6.4 - 8.3 g/dL Final     Albumin   Date Value Ref Range Status   07/27/2022 2.3 (L) 3.5 - 5.2 g/dL Final   07/26/2022 2.3 (L) 3.5 - 5.2 g/dL Final   07/25/2022 2.3 (L) 3.5 - 5.2 g/dL Final     Total Bilirubin   Date Value Ref Range Status   07/27/2022 0.6 0.0 - 1.2 mg/dL Final   07/26/2022 0.7 0.0 - 1.2 mg/dL Final   07/25/2022 0.6 0.0 - 1.2 mg/dL Final     Alkaline Phosphatase   Date Value Ref Range Status   07/27/2022 81 35 - 104 U/L Final   07/26/2022 81 35 - 104 U/L Final   07/25/2022 81 35 - 104 U/L Final     AST   Date Value Ref Range Status   07/27/2022 38 (H) 0 - 31 U/L Final   07/26/2022 24 0 - 31 U/L Final   07/25/2022 28 0 - 31 U/L Final     ALT   Date Value Ref Range Status   07/27/2022 18 0 - 32 U/L Final   07/26/2022 15 0 - 32 U/L Final   07/25/2022 16 0 - 32 U/L Final     GFR Non-   Date Value Ref Range Status   07/27/2022 52 >=60 mL/min/1.73 Final     Comment:     Chronic Kidney Disease: less than 60 ml/min/1.73 sq.m. Kidney Failure: less than 15 ml/min/1.73 sq.m. Results valid for patients 18 years and older. 07/26/2022 46 >=60 mL/min/1.73 Final     Comment:     Chronic Kidney Disease: less than 60 ml/min/1.73 sq.m. Kidney Failure: less than 15 ml/min/1.73 sq.m. Results valid for patients 18 years and older. 07/26/2022 46 >=60 mL/min/1.73 Final     Comment:     Chronic Kidney Disease: less than 60 ml/min/1.73 sq.m. Kidney Failure: less than 15 ml/min/1.73 sq.m. Results valid for patients 18 years and older.        GFR    Date Value Ref Range Status   07/27/2022 >60  Final   07/26/2022 56  Final   07/26/2022 56  Final Magnesium   Date Value Ref Range Status   07/27/2022 2.5 1.6 - 2.6 mg/dL Final   07/26/2022 2.7 (H) 1.6 - 2.6 mg/dL Final     Phosphorus   Date Value Ref Range Status   07/27/2022 3.7 2.5 - 4.5 mg/dL Final   07/26/2022 4.4 2.5 - 4.5 mg/dL Final     Recent Labs     07/27/22  0527   PH 7.451*   PO2 65.0*   PCO2 37.8   HCO3 25.7   BE 1.7   O2SAT 91.4*       RADIOLOGY:  XR CHEST PORTABLE   Final Result   Persistent bilateral pulmonary opacifications with bilateral pleural   effusions. XR CHEST PORTABLE   Final Result   Interval placement of left arm PICC terminating within the mid SVC/azygous   region without postprocedure pneumothorax. Persistent bilateral pulmonary   opacifications along with small to moderate right pleural effusion and   persistent cardiomegaly. XR CHEST PORTABLE   Final Result   No significant interval change of the past 15 hours. XR CHEST PORTABLE   Final Result   Stable bilateral pulmonary opacities. Possible right pleural effusion. XR CHEST PORTABLE   Final Result   Increasing airspace disease and volume loss in left hemithorax suspicious at   least partial left lower lobe collapse with mucous plugging. Pulmonary consultation is recommended. XR CHEST PORTABLE   Final Result   Increasing bilateral airspace disease with bilateral pleural effusions and   moderate cardiomegaly. XR HIP RIGHT (2-3 VIEWS)   Final Result   It intact right femoral gamma nail with near anatomic alignment of femoral   fracture fragments and severe right hip osteoarthritis. CT HEAD WO CONTRAST   Final Result   No acute intracranial abnormality. Chronic microvascular ischemic changes. Small old lacunar infarcts. FL MODIFIED BARIUM SWALLOW W VIDEO   Final Result   Swallowing dysfunction as described above including aspiration of thin liquid   barium and barium nectar.       Please see separate speech pathology report for full discussion of findings   and recommendations. Fluoro For Surgical Procedures   Final Result   Intraprocedural fluoroscopic spot images as above. See separate procedure   report for more information. XR CHEST PORTABLE   Final Result   Bilateral pulmonary opacities, left greater than right, likely   infectious/inflammatory. Aspiration pneumonitis would also be in the   differential diagnosis. XR HIP RIGHT (1 VIEW)   Final Result      Intertrochanteric fracture of the proximal right femur. XR FEMUR RIGHT (MIN 2 VIEWS)   Final Result   Intertrochanteric fracture of the proximal right femur         XR HIP RIGHT (2-3 VIEWS)   Final Result   Displaced right intertrochanteric fracture with varus deformity. .         XR CHEST 1 VIEW   Final Result   No acute process. CT HEAD WO CONTRAST   Final Result   No acute intracranial abnormality. CT CERVICAL SPINE WO CONTRAST   Final Result   No acute abnormality of the cervical spine. Multilevel degenerative changes. XR CHEST PORTABLE    (Results Pending)           PROBLEM LIST:  Principal Problem:    Hip fracture requiring operative repair, left, open type I or II, initial encounter ([facility]  Active Problems:    Severe protein-calorie malnutrition ([facility]    Acute encephalopathy    Acute hypoxemic respiratory failure due to COVID-19 [facility])    Aspiration pneumonia ([facility]  Resolved Problems:    * No resolved hospital problems.  *      IMPRESSION:  Acute hypoxemic respiratory failure  Extensive bilateral lung infiltrates  MANA  Hyperkalemia, improved  Status post right hip ORIF  Protein calorie malnutrition, on tube feedings  Sepsis from pneumonia  Status post bronchoscopy      PLAN:  Continue to alternate BiPAP and combination of Airvo with nonrebreather  Tube feedings  IV fluid  Antimicrobials including p.o. and IV vancomycin, Zosyn, Eraxis  Precedex as tolerated    ATTESTATION:  ICU Staff Physician note of personal involvement in Care  As the

## 2022-07-27 NOTE — PROGRESS NOTES
Comprehensive Nutrition Assessment    Type and Reason for Visit:  Reassess    Nutrition Recommendations/Plan:   Continue current TF & monitor tolerance     Malnutrition Assessment:  Malnutrition Status:  Severe malnutrition (07/18/22 1403)    Context:  Chronic Illness     Findings of the 6 clinical characteristics of malnutrition:  Energy Intake:  75% or less estimated energy requirements for 1 month or longer  Weight Loss:  Unable to assess     Body Fat Loss:  Severe body fat loss Buccal region, Orbital   Muscle Mass Loss:  Severe muscle mass loss Clavicles (pectoralis & deltoids), Temples (temporalis)  Fluid Accumulation:  No significant fluid accumulation     Strength:  Not Performed    Nutrition Assessment:    Pt adm d/t R hip pain/fx s/p ORIF w/ cephalomedullary nail (7/15). Note severe malnutrition w/ Dysphagia/asp s/p failed MBSS, Noted s/p PEG. Continue current TF regimen & monitor tolerance. Nutrition Related Findings:    A/O x1, flat/soft/nontender +BS diarrhea PEG, I/O +6.1L, +2 BUE & +2 BLE edema Wound Type: Multiple, Skin Tears, Deep Tissue Injury, Surgical Incision (Coccyx, Lt pretibia, rt thigh)       Current Nutrition Intake & Therapies:    Average Meal Intake: NPO  Average Supplements Intake: NPO  Diet NPO Exceptions are: Other (Specify); Specify Other Exceptions: meds per PEG-tube only  ADULT TUBE FEEDING; PEG; Standard with Fiber; Continuous; 20; Yes; 20; Other (specify); maintain at 20 cc/hr; 20; 100; Q 6 hours; Protein; 1 daily    Anthropometric Measures:  Height: 5' 2\" (157.5 cm)  Ideal Body Weight (IBW): 110 lbs (50 kg)    Admission Body Weight: 91 lb 3.2 oz (41.4 kg)  Current Body Weight: 110 lb (49.9 kg) (7/26 bed), 100 % IBW.     Current BMI (kg/m2): 20.1  Usual Body Weight:  (lack measured wt hx per EMR)     Weight Adjustment For: No Adjustment     BMI Categories: Underweight (BMI less than 22) age over 72    Estimated Daily Nutrient Needs:  Energy Requirements Based On: Kcal/kg  Weight Used for Energy Requirements: Admission  Energy (kcal/day): 5228-6206  Weight Used for Protein Requirements: Admission  Protein (g/day): 75-85  Method Used for Fluid Requirements: 1 ml/kcal  Fluid (ml/day): 8325-2856    Nutrition Diagnosis:   Severe malnutrition, In context of chronic illness related to cognitive or neurological impairment as evidenced by Criteria as identified in malnutrition assessment, severe loss of subcutaneous fat, severe muscle loss, poor intake prior to admission, intake 0-25%    Nutrition Interventions:   Food and/or Nutrient Delivery: Continue Current Tube Feeding  Nutrition Education/Counseling: No recommendation at this time  Coordination of Nutrition Care: Continue to monitor while inpatient     Goals:     Goals: Tolerate nutrition support at goal rate     Nutrition Monitoring and Evaluation:   Behavioral-Environmental Outcomes: None Identified  Food/Nutrient Intake Outcomes: Enteral Nutrition Intake/Tolerance  Physical Signs/Symptoms Outcomes: Biochemical Data, Diarrhea, GI Status, Fluid Status or Edema, Weight, Skin, Nutrition Focused Physical Findings    Discharge Planning:     Too soon to determine     Edin Ding RD  Contact: [phone]

## 2022-07-27 NOTE — PROGRESS NOTES
[address] [facility]  [facility]  Progress Note    NAME: Franklin Murphy  MR:  [id number]  :   10/17/1928  DATE OF SERVICE:22    This is a face to face encounter with Franklin Murphy 80 y.o. female on 22  Elements of this note, including Diagnosis,  Interval History, Past Medical/Surgical/Family/Social Histories, ROS, physical exam, and Assessment and Plan were copied and pasted from Previous. Updates have been made where noted and reflect current exam and medical decision making from the DOS of this encounter. CHIEF COMPLAINT     ID following for   Chief Complaint   Patient presents with    Fall     HISTORY OF PRESENT ILLNESS     Pt seen and examined  22  In icu   Arouses briefly   S/p bronch  cx ngtd   Afebrile  bipap  Wbc18.2 cr1.0       Pt is more awake  G-daughter present had no questions  Patient is tolerating medications. No reported adverse drug reactions. REVIEW OF SYSTEMS     As stated above in the chief complaint, otherwise negative.   CURRENT MEDICATIONS      vancomycin  250 mg Oral 4 times per day    acetylcysteine  4 mL Inhalation Q4H    sodium chloride flush  5-40 mL IntraVENous 2 times per day    heparin flush  3 mL IntraVENous 2 times per day    anidulafungin  100 mg IntraVENous Q24H    heparin (porcine)  5,000 Units SubCUTAneous BID    vancomycin (VANCOCIN) intermittent dosing (placeholder)   Other RX Placeholder    piperacillin-tazobactam  3,375 mg IntraVENous Q12H    ipratropium-albuterol  1 ampule Inhalation Q4H    dexamethasone  6 mg IntraVENous Q24H    polyethylene glycol  17 g Per G Tube BID    docusate  100 mg Per G Tube BID    metoprolol tartrate  50 mg Oral TID    pantoprazole  40 mg IntraVENous Daily    aspirin  81 mg Oral BID WC     Continuous Infusions:   sodium chloride      dexmedetomidine HCl in NaCl 0.2 mcg/kg/hr (22 1820)    sodium chloride 75 mL/hr at 22 0438    sodium chloride 25 mL (22 2306)     PRN Meds:sodium chloride flush, sodium chloride, heparin flush, bisacodyl, sodium chloride flush, sodium chloride    PHYSICAL EXAM     BP (!) 140/64   Pulse 83   Temp (!) 96.1 °F (35.6 °C) (Axillary)   Resp (!) 37   Ht 5' 2\" (1.575 m)   Wt 110 lb (49.9 kg)   SpO2 91%   BMI 20.12 kg/m²   Temp  Av.2 °F (36.2 °C)  Min: 96.1 °F (35.6 °C)  Max: 98.1 °F (36.7 °C)  Constitutional:  The patient is  on  bipap  Skin:    Warm and dry. HEENT:       Chest:    Symmetrical expansion. Dec bs bases bipap  Cardiovascular:  S1 and S2 are rhythmic and regular. Abdomen:   Positive bowel sounds to auscultation. Benign to palpation. Extremities:    Edema thigh bruises right hip dressed  CNS    Awake agitated  Lines: piv            DIAGNOSTIC RESULTS   Radiology:    Recent Labs     22  0754 22  1054 22  0531 22  0448   WBC 25.2*  --  23.4* 18.2*   RBC 2.40*  --  2.55* 2.38*   HGB 7.3* 7.4* 7.5* 7.1*   HCT 24.1*  --  26.3* 23.4*   .4*  --  103.1* 98.3   MCH 30.4  --  29.4 29.8   MCHC 30.3*  --  28.5* 30.3*   RDW 15.9*  --  16.6* 16.8*     --  510* 451*   MPV 10.2  --  10.3 10.3       Recent Labs     22  1421 22  2303 22  0531 22  1159 22  0448      < > 141 142 139   K 6.1*   < > 5.8* 5.4* 5.0      < > 104 104 103   CO2 30*   < > 30* 30* 28   BUN 68*   < > 74* 74* 77*   CREATININE 1.1*   < > 1.1* 1.1* 1.0   GLUCOSE 153*   < > 138* 127* 185*   PROT 5.5*  --  5.3*  --  5.1*   LABALBU 2.3*  --  2.3*  --  2.3*   CALCIUM 8.6   < > 8.6 8.4* 8.3*   BILITOT 0.6  --  0.7  --  0.6   ALKPHOS 81  --  81  --  81   AST 28  --  24  --  38*   ALT 16  --  15  --  18    < > = values in this interval not displayed.        Lab Results   Component Value Date    CRP 16.1 (H) 2022    CRP 12.6 (H) 2022     Lab Results   Component Value Date    SEDRATE 12 2013     Recent Labs     22  0829 22  0834 22  0834 22  0754 22  1421 07/26/22  0531 07/27/22  0448   CRP 12.6*  --   --  16.1*  --   --   --    PROCAL  --  0.63*  --   --   --   --   --    *  --   --  238*  --   --   --    AST  --  26   < > 29 28 24 38*   ALT  --  14   < > 15 16 15 18    < > = values in this interval not displayed. Microbiology:      FINAL IMPRESSION    Pt had   Chief Complaint   Patient presents with    Fall    Admitted for Closed fracture of right hip, initial encounter [facility]) [S72.001A]  Hip fracture requiring operative repair, left, open type I or II, initial encounter ([facility] [address] [S72.002B]  Fall on same level from slipping, tripping or stumbling, initial encounter [W01. 0XXA]  Traumatic rhabdomyolysis, initial encounter ([facility]. 6XXA]  On treatment for   Leukocytosis  better   Covid +  7/24 S/P toci   HCAP vs aspiration LLL       7/25/ s/p bronch  7/15 mucous plug  RIGHT INTROCHANTERIC CEPHALOMEDULLARY NAIL  INSERTION  Rigth thigh hematoma   UTI s/p ceftriaxone       vancomycin (VANCOCIN) oral solution 250 mg, 4 times per day  vancomycin (VANCOCIN) intermittent dosing (placeholder), RX Placeholder  dexmedetomidine (PRECEDEX) 400 mcg in sodium chloride 0.9 % 100 mL infusion, Continuous  piperacillin-tazobactam (ZOSYN) 3,375 mg in dextrose 5 % 50 mL IVPB extended infusion (mini-bag), Q12H  dexamethasone (DECADRON) injection 6 mg, Q24H     Plan 8 days of atbx          Imaging and labs were reviewed per medical records.         Electronically signed by Marianela Hendricks MD on 7/27/2022 at 8:24 AM

## 2022-07-28 NOTE — ACP (ADVANCE CARE PLANNING)
7/28/2022 CM transition of care: ICU,+Covid 7/24/22, bipap versus airvo with nrb- pt on 100% fio2, precedex gtt. POD #13- ORIF 7/15/22, peg 7/18/22, fob 7/25/22. Limited code status all No's, Palliative care for \"goals of care\" may be beneficial. NEW oral vanco- watch for discharge plans. Cm to follow.  Electronically signed by Tre Felix RN-BC on 7/28/2022 at 10:32 AM

## 2022-07-28 NOTE — PROGRESS NOTES
Department of Orthopedic Surgery  Resident Progress Note    Patient seen and examined. She had a right intertrochanteric hip fracture which was treated with cephalomedullary nail on 7/15. Initially the patient's condition was fair, however she had recently been transferred to the ICU, COVID-positive, has increased respiratory demands. She is confused and lethargic. Unable to provide any history. Orthopedics had signed off and anticipated discharge with office follow-up at the 2-week zaheer, however the patient is still hospitalized. VITALS:  /63   Pulse (!) 101   Temp (!) 96.5 °F (35.8 °C) (Axillary)   Resp (!) 53   Ht 5' 2\" (1.575 m)   Wt 110 lb (49.9 kg)   SpO2 96%   BMI 20.12 kg/m²     General: lethargic and disoriented    MUSCULOSKELETAL:   right lower extremity:  Aquacel dressings removed, surgical incisions are healing well, staples still in place  Compartments soft and compressible  Patient does not follow motor commands  +2/4 DP & PT pulses, Brisk Cap refill, Toes warm and perfused  Unable to cooperate with sensory exam    CBC:   Lab Results   Component Value Date/Time    WBC 13.3 07/28/2022 05:06 AM    HGB 8.1 07/28/2022 05:06 AM    HCT 27.2 07/28/2022 05:06 AM     07/28/2022 05:06 AM     PT/INR:    Lab Results   Component Value Date/Time    PROTIME 12.8 07/18/2022 07:43 AM    INR 1.1 07/18/2022 07:43 AM         ASSESSMENT  S/P right hip cephalomedullary nail insertion for intertrochanteric hip fracture on 7/15    PLAN      Continue physical therapy and protocol: WBAT - RLE  Deep venous thrombosis prophylaxis -per primary  Patient has steadily declined throughout her hospital stay, she is currently in the ICU. She is a DNR/limited code.   From an orthopedic surgery standpoint, her incisions over the right hip appear to be healing well, plan on having staples removed today  PT/OT as able  Pain Control: IV and PO  Overall management per primary and ICU

## 2022-07-28 NOTE — PROGRESS NOTES
Pulmonary/Critical Care Progress Note    We are following patient for acute hypoxemic respiratory failure, bilateral pneumonia, secretions from bronchoscopy growing gram negative oxidase positive organism (likely Pseudomonas), status post ORIF right hip MANA, protein calorie malnutrition, history of rhabdomyolysis    SUBJECTIVE:  Patient is maintaining adequate oxygen saturation on a combination of alternating BiPAP with heated high flow nasal cannula with a 100% nonrebreather in combination. Patient remains active and occasionally understandable. Her family is in attendance a good deal time. Secretions from bronchoscopy done 3 days ago is now growing likely Pseudomonas. She is currently on Zosyn but we will wait for sensitivities. Vancomycin has been stopped, especially in consideration of MRSA swabs being negative. She continues on dexamethasone 6 mg daily because of her COVID-positive status. She has been tolerating tube feedings at 20 cc/h for 2 days. Therefore, we will increase her rate to 30 cc/day which will be very important considering her high work of breathing. We have stopped her beta-blockers because of low heart rate. We have also reduced the rate of Precedex to 0.1 mcg/kg/h because of bradycardia. IV fluids have also been reduced. Encouragingly, her white blood cell count has coming down incrementally from 23,000 to 18,000-13,000 today.     MEDICATIONS:   piperacillin-tazobactam  3,375 mg IntraVENous Q8H    enoxaparin  30 mg SubCUTAneous Daily    acetylcysteine  4 mL Inhalation TID    sodium chloride flush  5-40 mL IntraVENous 2 times per day    heparin flush  3 mL IntraVENous 2 times per day    anidulafungin  100 mg IntraVENous Q24H    ipratropium-albuterol  1 ampule Inhalation Q4H    dexamethasone  6 mg IntraVENous Q24H    polyethylene glycol  17 g Per G Tube BID    pantoprazole  40 mg IntraVENous Daily    aspirin  81 mg Oral BID WC      dexmedetomidine HCl in NaCl 0.1 mcg/kg/hr (07/28/22 1016)    sodium chloride      sodium chloride 75 mL/hr at 07/28/22 0534    sodium chloride Stopped (07/25/22 0746)     sodium chloride flush, sodium chloride, heparin flush, bisacodyl, sodium chloride flush, sodium chloride      REVIEW OF SYSTEMS:  Patient is unable to communicate with  Because she is wearing her BiPAP. OBJECTIVE:  Vitals:    07/28/22 1200   BP: (!) 127/49   Pulse: 91   Resp: (!) 54   Temp:    SpO2: 94%     FiO2 : 50 %  O2 Flow Rate (L/min): 60 L/min  O2 Device: PAP (positive airway pressure)    PHYSICAL EXAM:  Constitutional: No fever, chills, diaphoresis  Skin: Skin rash, no skin breakdown  HEENT: Unremarkable  Neck: No JVD, lymphadenopathy, thyromegaly  Cardiovascular: S1, S2 slightly irregular. No S3 or rubs present  Respiratory: Inspiratory crackles bilaterally. Low pitched wheezing anteriorly. Gastrointestinal: Soft, flat, nontender  Genitourinary: No grossly bloody urine  Extremities: No clubbing, cyanosis, or edema  Neurological: Patient interacts with family.   Occasionally screams out, moves all extremities  Psychological: Cannot evaluate    LABS:  WBC   Date Value Ref Range Status   07/28/2022 13.3 (H) 4.5 - 11.5 E9/L Final   07/27/2022 18.2 (H) 4.5 - 11.5 E9/L Final   07/26/2022 23.4 (H) 4.5 - 11.5 E9/L Final     Hemoglobin   Date Value Ref Range Status   07/28/2022 8.1 (L) 11.5 - 15.5 g/dL Final   07/27/2022 7.1 (L) 11.5 - 15.5 g/dL Final   07/26/2022 7.5 (L) 11.5 - 15.5 g/dL Final     Hematocrit   Date Value Ref Range Status   07/28/2022 27.2 (L) 34.0 - 48.0 % Final   07/27/2022 23.4 (L) 34.0 - 48.0 % Final   07/26/2022 26.3 (L) 34.0 - 48.0 % Final     MCV   Date Value Ref Range Status   07/28/2022 100.0 (H) 80.0 - 99.9 fL Final   07/27/2022 98.3 80.0 - 99.9 fL Final   07/26/2022 103.1 (H) 80.0 - 99.9 fL Final     Platelets   Date Value Ref Range Status   07/28/2022 436 130 - 450 E9/L Final   07/27/2022 451 (H) 130 - 450 E9/L Final   07/26/2022 510 (H) 130 - 450 E9/L Final Sodium   Date Value Ref Range Status   07/28/2022 142 132 - 146 mmol/L Final   07/27/2022 139 132 - 146 mmol/L Final   07/26/2022 142 132 - 146 mmol/L Final     Potassium   Date Value Ref Range Status   07/28/2022 4.5 3.5 - 5.0 mmol/L Final   07/27/2022 5.0 3.5 - 5.0 mmol/L Final   07/26/2022 5.4 (H) 3.5 - 5.0 mmol/L Final     Chloride   Date Value Ref Range Status   07/28/2022 106 98 - 107 mmol/L Final   07/27/2022 103 98 - 107 mmol/L Final   07/26/2022 104 98 - 107 mmol/L Final     CO2   Date Value Ref Range Status   07/28/2022 26 22 - 29 mmol/L Final   07/27/2022 28 22 - 29 mmol/L Final   07/26/2022 30 (H) 22 - 29 mmol/L Final     BUN   Date Value Ref Range Status   07/28/2022 70 (H) 6 - 23 mg/dL Final   07/27/2022 77 (H) 6 - 23 mg/dL Final   07/26/2022 74 (H) 6 - 23 mg/dL Final     Creatinine   Date Value Ref Range Status   07/28/2022 0.9 0.5 - 1.0 mg/dL Final   07/27/2022 1.0 0.5 - 1.0 mg/dL Final   07/26/2022 1.1 (H) 0.5 - 1.0 mg/dL Final     Glucose   Date Value Ref Range Status   07/28/2022 177 (H) 74 - 99 mg/dL Final   07/27/2022 185 (H) 74 - 99 mg/dL Final   07/26/2022 127 (H) 74 - 99 mg/dL Final     Calcium   Date Value Ref Range Status   07/28/2022 8.4 (L) 8.6 - 10.2 mg/dL Final   07/27/2022 8.3 (L) 8.6 - 10.2 mg/dL Final   07/26/2022 8.4 (L) 8.6 - 10.2 mg/dL Final     Total Protein   Date Value Ref Range Status   07/28/2022 5.4 (L) 6.4 - 8.3 g/dL Final   07/27/2022 5.1 (L) 6.4 - 8.3 g/dL Final   07/26/2022 5.3 (L) 6.4 - 8.3 g/dL Final     Albumin   Date Value Ref Range Status   07/28/2022 2.7 (L) 3.5 - 5.2 g/dL Final   07/27/2022 2.3 (L) 3.5 - 5.2 g/dL Final   07/26/2022 2.3 (L) 3.5 - 5.2 g/dL Final     Total Bilirubin   Date Value Ref Range Status   07/28/2022 0.5 0.0 - 1.2 mg/dL Final   07/27/2022 0.6 0.0 - 1.2 mg/dL Final   07/26/2022 0.7 0.0 - 1.2 mg/dL Final     Alkaline Phosphatase   Date Value Ref Range Status   07/28/2022 108 (H) 35 - 104 U/L Final   07/27/2022 81 35 - 104 U/L Final 07/26/2022 81 35 - 104 U/L Final     AST   Date Value Ref Range Status   07/28/2022 41 (H) 0 - 31 U/L Final   07/27/2022 38 (H) 0 - 31 U/L Final   07/26/2022 24 0 - 31 U/L Final     ALT   Date Value Ref Range Status   07/28/2022 31 0 - 32 U/L Final   07/27/2022 18 0 - 32 U/L Final   07/26/2022 15 0 - 32 U/L Final     GFR Non-   Date Value Ref Range Status   07/28/2022 58 >=60 mL/min/1.73 Final     Comment:     Chronic Kidney Disease: less than 60 ml/min/1.73 sq.m. Kidney Failure: less than 15 ml/min/1.73 sq.m. Results valid for patients 18 years and older. 07/27/2022 52 >=60 mL/min/1.73 Final     Comment:     Chronic Kidney Disease: less than 60 ml/min/1.73 sq.m. Kidney Failure: less than 15 ml/min/1.73 sq.m. Results valid for patients 18 years and older. 07/26/2022 46 >=60 mL/min/1.73 Final     Comment:     Chronic Kidney Disease: less than 60 ml/min/1.73 sq.m. Kidney Failure: less than 15 ml/min/1.73 sq.m. Results valid for patients 18 years and older. GFR    Date Value Ref Range Status   07/28/2022 >60  Final   07/27/2022 >60  Final   07/26/2022 56  Final     Magnesium   Date Value Ref Range Status   07/28/2022 2.4 1.6 - 2.6 mg/dL Final   07/27/2022 2.5 1.6 - 2.6 mg/dL Final   07/26/2022 2.7 (H) 1.6 - 2.6 mg/dL Final     Phosphorus   Date Value Ref Range Status   07/28/2022 3.7 2.5 - 4.5 mg/dL Final   07/27/2022 3.7 2.5 - 4.5 mg/dL Final   07/26/2022 4.4 2.5 - 4.5 mg/dL Final     Recent Labs     07/27/22  0527   PH 7.451*   PO2 65.0*   PCO2 37.8   HCO3 25.7   BE 1.7   O2SAT 91.4*       RADIOLOGY:  XR CHEST PORTABLE   Final Result   Extensive bilateral airspace disease with mild interval progression at the   right upper lobe. Pleural effusions. XR CHEST PORTABLE   Final Result   Persistent bilateral pulmonary opacifications with bilateral pleural   effusions.          XR CHEST PORTABLE   Final Result   Interval placement of left arm PICC terminating within the mid SVC/azygous   region without postprocedure pneumothorax. Persistent bilateral pulmonary   opacifications along with small to moderate right pleural effusion and   persistent cardiomegaly. XR CHEST PORTABLE   Final Result   No significant interval change of the past 15 hours. XR CHEST PORTABLE   Final Result   Stable bilateral pulmonary opacities. Possible right pleural effusion. XR CHEST PORTABLE   Final Result   Increasing airspace disease and volume loss in left hemithorax suspicious at   least partial left lower lobe collapse with mucous plugging. Pulmonary consultation is recommended. XR CHEST PORTABLE   Final Result   Increasing bilateral airspace disease with bilateral pleural effusions and   moderate cardiomegaly. XR HIP RIGHT (2-3 VIEWS)   Final Result   It intact right femoral gamma nail with near anatomic alignment of femoral   fracture fragments and severe right hip osteoarthritis. CT HEAD WO CONTRAST   Final Result   No acute intracranial abnormality. Chronic microvascular ischemic changes. Small old lacunar infarcts. FL MODIFIED BARIUM SWALLOW W VIDEO   Final Result   Swallowing dysfunction as described above including aspiration of thin liquid   barium and barium nectar. Please see separate speech pathology report for full discussion of findings   and recommendations. Fluoro For Surgical Procedures   Final Result   Intraprocedural fluoroscopic spot images as above. See separate procedure   report for more information. XR CHEST PORTABLE   Final Result   Bilateral pulmonary opacities, left greater than right, likely   infectious/inflammatory. Aspiration pneumonitis would also be in the   differential diagnosis. XR HIP RIGHT (1 VIEW)   Final Result      Intertrochanteric fracture of the proximal right femur.          XR FEMUR RIGHT (MIN 2 VIEWS)   Final Result Intertrochanteric fracture of the proximal right femur         XR HIP RIGHT (2-3 VIEWS)   Final Result   Displaced right intertrochanteric fracture with varus deformity. .         XR CHEST 1 VIEW   Final Result   No acute process. CT HEAD WO CONTRAST   Final Result   No acute intracranial abnormality. CT CERVICAL SPINE WO CONTRAST   Final Result   No acute abnormality of the cervical spine. Multilevel degenerative changes. PROBLEM LIST:  Principal Problem:    Hip fracture requiring operative repair, left, open type I or II, initial encounter ([facility] [address]  Active Problems:    Severe protein-calorie malnutrition ([facility] [address]    Acute encephalopathy    Acute hypoxemic respiratory failure due to COVID-19 [facility])    Aspiration pneumonia ([facility] [address]  Resolved Problems:    * No resolved hospital problems. *      IMPRESSION:  Acute hypoxemic respiratory failure  Extensive bilateral pneumonia with secretions growing likely Pseudomonas  MANA, slowly better  Hyperkalemia, slowly improving  Protein calorie malnutrition  Status post bronchoscopy  Status post ORIF right hip      PLAN:  Continue low-dose Precedex  Reduce IV fluids to 50 cc/h  Increase tube feedings to 30 cc/h with flushes  Keep on Zosyn until sensitivities to Pseudomonas are known  DC vancomycin  Continue Eraxis  Continue BiPAP every 4 hours alternating with air Vo/nonrebreather    ATTESTATION:  ICU Staff Physician note of personal involvement in Care  As the attending physician, I certify that I personally reviewed the patients history and personally examined the patient to confirm the physical findings described above,  And that I reviewed the relevant imaging studies and available reports. I also discussed the differential diagnosis and all of the proposed management plans with the patient and individuals accompanying the patient to this visit.   They had the opportunity to ask questions about the proposed management plans and to have those questions answered. This patient has a high probability of sudden, clinically significant deterioration, which requires the highest level of physician preparedness to intervene urgently. I managed/supervised life or organ supporting interventions that required frequent physician assessment. I devoted my full attention to the direct care of this patient for the amount of time indicated below. Time I spent with the family or surrogate(s) is included only if the patient was incapable of providing the necessary information or participating in medical decisions - Time devoted to teaching and to any procedures I billed separately is not included.     CRITICAL CARE TIME:  31 minutes    Electronically signed by Rebecca Monson MD on 7/28/2022 at 2:10 PM

## 2022-07-28 NOTE — PROGRESS NOTES
[facility]  [facility]  Progress Note    NAME: Gypsy Garza  MR:  [id number]  :   10/17/1928  DATE OF SERVICE:22    This is a face to face encounter with Gypsy Garza 80 y.o. female on 22  Elements of this note, including Diagnosis,  Interval History, Past Medical/Surgical/Family/Social Histories, ROS, physical exam, and Assessment and Plan were copied and pasted from Previous. Updates have been made where noted and reflect current exam and medical decision making from the DOS of this encounter. CHIEF COMPLAINT     ID following for   Chief Complaint   Patient presents with    Fall     HISTORY OF PRESENT ILLNESS     Pt seen and examined  22  In icu   Daughter present   Arouses briefly   S/p bronch  cx oxidase+  Afebrile temp 96.5  Wbc13.3 cr0.9      Patient is tolerating medications. No reported adverse drug reactions. REVIEW OF SYSTEMS     As stated above in the chief complaint, otherwise negative.   CURRENT MEDICATIONS      vancomycin  250 mg Oral 4 times per day    acetylcysteine  4 mL Inhalation TID    sodium chloride flush  5-40 mL IntraVENous 2 times per day    heparin flush  3 mL IntraVENous 2 times per day    anidulafungin  100 mg IntraVENous Q24H    heparin (porcine)  5,000 Units SubCUTAneous BID    vancomycin (VANCOCIN) intermittent dosing (placeholder)   Other RX Placeholder    piperacillin-tazobactam  3,375 mg IntraVENous Q12H    ipratropium-albuterol  1 ampule Inhalation Q4H    dexamethasone  6 mg IntraVENous Q24H    polyethylene glycol  17 g Per G Tube BID    pantoprazole  40 mg IntraVENous Daily    aspirin  81 mg Oral BID WC     Continuous Infusions:   dexmedetomidine HCl in NaCl 0.1 mcg/kg/hr (22 1016)    sodium chloride      sodium chloride 75 mL/hr at 22 0534    sodium chloride Stopped (22 0746)     PRN Meds:sodium chloride flush, sodium chloride, heparin flush, bisacodyl, sodium chloride flush, sodium chloride    PHYSICAL EXAM     BP (!) 182/90   Pulse 96   Temp (!) 96.5 °F (35.8 °C) (Axillary)   Resp (!) 50   Ht 5' 2\" (1.575 m)   Wt 110 lb (49.9 kg)   SpO2 91%   BMI 20.12 kg/m²   Temp  Av.3 °F (35.7 °C)  Min: 96 °F (35.6 °C)  Max: 96.5 °F (35.8 °C)  Constitutional:  The patient is  on  HFNC  Skin:    Warm and dry. No rash   HEENT:       Chest:    Symmetrical expansion. Dec bs bases bipap  Cardiovascular:  S1 and S2 are rhythmic and regular. Abdomen:   Positive bowel sounds to auscultation. Benign to palpation.    Extremities:    Edema thigh bruises right hip dressed  CNS    Awake agitated  Lines: picc       DIAGNOSTIC RESULTS   Radiology:    Recent Labs     22  0531 22  0448 22  0506   WBC 23.4* 18.2* 13.3*   RBC 2.55* 2.38* 2.72*   HGB 7.5* 7.1* 8.1*   HCT 26.3* 23.4* 27.2*   .1* 98.3 100.0*   MCH 29.4 29.8 29.8   MCHC 28.5* 30.3* 29.8*   RDW 16.6* 16.8* 17.4*   * 451* 436   MPV 10.3 10.3 10.5       Recent Labs     22  0531 22  1159 22  0448 22  0506    142 139 142   K 5.8* 5.4* 5.0 4.5    104 103 106   CO2 30* 30* 28 26   BUN 74* 74* 77* 70*   CREATININE 1.1* 1.1* 1.0 0.9   GLUCOSE 138* 127* 185* 177*   PROT 5.3*  --  5.1* 5.4*   LABALBU 2.3*  --  2.3* 2.7*   CALCIUM 8.6 8.4* 8.3* 8.4*   BILITOT 0.7  --  0.6 0.5   ALKPHOS 81  --  81 108*   AST 24  --  38* 41*   ALT 15  --  18 31       Lab Results   Component Value Date    CRP 16.1 (H) 2022    CRP 12.6 (H) 2022     Lab Results   Component Value Date    SEDRATE 12 2013     Recent Labs     22  0531 22  0448 22  0506   AST 24 38* 41*   ALT 15 18 31       Microbiology:      FINAL IMPRESSION    Pt had   Chief Complaint   Patient presents with    Fall    Admitted for Closed fracture of right hip, initial encounter ([facility] [S72.001A]  Hip fracture requiring operative repair, left, open type I or II, initial encounter ([facility] [S72.002B]  Fall on same level from slipping, tripping or stumbling, initial encounter [W01. 0XXA]  Traumatic rhabdomyolysis, initial encounter ([facility] Curlee Favor. 6XXA]  On treatment for   Leukocytosis  better   Covid +  7/24 S/P toci   HCAP vs aspiration LLL oxidase+       7/25/ s/p bronch  7/15 mucous plug  RIGHT INTROCHANTERIC CEPHALOMEDULLARY NAIL  INSERTION  Rigth thigh hematoma   UTI s/p ceftriaxone     cont currrent POC      Imaging and labs were reviewed per medical records.         Electronically signed by Delvin Briones MD on 7/28/2022 at 11:11 AM

## 2022-07-28 NOTE — CONSULTS
[address]                                  CONSULTATION    PATIENT NAME: Sara Burton                 :        10/17/1928  MED REC NO:   [id number]                            ROOM:       10  ACCOUNT NO:   [de-identified]                           ADMIT DATE: 2022  PROVIDER:     Chung Moe MD    CONSULT DATE:  2022    SUBJECTIVE:  The patient has sluggish responses to verbal stimuli. OBJECTIVE:  VITAL SIGNS:  Blood pressure 131/58, pulse around 62. NECK:  No JVD. HEART:  Regular S1 and S2 with premature beats. No S3.  LUNGS:  Diminished breath sounds in the bases with coarse rhonchi in the  bases. ABDOMEN:  Nondistended. EXTREMITIES:  No edema, cyanosis, or clubbing. IMPRESSION:  1. Right hip fracture. The patient underwent surgery. She tolerated the surgery fairly well from cardiac standpoint. 2.  Sinus tachycardia with PACs and runs of atrial tachycardia. On metoprolol. 3.  Aspiration pneumonia. Status post PEG tube. 4.  COVID-19 infection.         Sherif Neumann MD    D: 2022 16:16:55       T: 2022 16:47:30     [id number]  Job#: [id number]     Doc#: [id number]    CC:

## 2022-07-28 NOTE — PROGRESS NOTES
Removed 13 staples from right hip per physician order. Incision site well approximated, seeping noted along incision line and from where staples were removed. Small amount of serosanguinous drainage noted. Removed an additional 9 staples and put foam dx on incision sites due to seeping.

## 2022-07-28 NOTE — PROGRESS NOTES
94%   BMI 20.12 kg/m²   CONSTITUTIONAL: somnolent  EYES:  extra-ocular muscles intact and vision intact  ENT:  normocepalic, without obvious abnormality  NECK:  supple, symmetrical, trachea midline  LUNGS:  tachypneic and no crackles or wheezing  CARDIOVASCULAR:  normal apical pulses and normal S1 and S2  ABDOMEN:  normal bowel sounds, non-distended, and non-tender  MUSCULOSKELETAL:  there is no redness, warmth, or swelling of the joints  NEUROLOGIC:  Mental Status Exam:  Level of Alertness:   awake  Orientation:   person, place, time  SKIN:  no bruising or bleeding  Data    CBC:   Lab Results   Component Value Date/Time    WBC 13.3 07/28/2022 05:06 AM    RBC 2.72 07/28/2022 05:06 AM    HGB 8.1 07/28/2022 05:06 AM    HCT 27.2 07/28/2022 05:06 AM    .0 07/28/2022 05:06 AM    MCH 29.8 07/28/2022 05:06 AM    MCHC 29.8 07/28/2022 05:06 AM    RDW 17.4 07/28/2022 05:06 AM     07/28/2022 05:06 AM    MPV 10.5 07/28/2022 05:06 AM     BMP:    Lab Results   Component Value Date/Time     07/28/2022 05:06 AM    K 4.5 07/28/2022 05:06 AM     07/28/2022 05:06 AM    CO2 26 07/28/2022 05:06 AM    BUN 70 07/28/2022 05:06 AM    LABALBU 2.7 07/28/2022 05:06 AM    CREATININE 0.9 07/28/2022 05:06 AM    CALCIUM 8.4 07/28/2022 05:06 AM    GFRAA >60 07/28/2022 05:06 AM    LABGLOM 58 07/28/2022 05:06 AM    GLUCOSE 177 07/28/2022 05:06 AM       ASSESSMENT AND PLAN      1. Hip fracture requiring operative repair, left, open type I or II, initial encounter   S/p replacement  Patient's condition has declined, may not be able to tolerate PT/OT  Anticoagulation as prescribed    2. Severe protein-calorie malnutrition   Tube feeding    3. Acute encephalopathy    4. Acute hypoxemic respiratory failure due to COVID-19   Continue supplemental oxygen  Continue to monitor    5. Aspiration pneumonia ([facility]  Defer abx per ID    6. Anemia due to blood loss:  Continue to monitor and transfuse per clinical conditions    7.   End of life goals care: palliative consulted.

## 2022-07-28 NOTE — CONSULTS
Palliative Care Department  [phone]  Palliative Care Initial Consult  Provider Maxi Jimenez, APRN - [address]  [phone]  Hospital Day: 12  Date of Initial Consult: 7/28/2022  Referring Provider: Dr. Essence Hagan was consulted for assistance with: Goals of care    HPI:   Jolanta Ochoa is a 80 y.o. with a medical history of Paget's disease, osteoarthritis, CVA, emphysema and thyroid goiter who was admitted on 7/13/2022 from home with a CHIEF COMPLAINT of s/p fall. Patient was found laying on the ground with unknown downtime. Patient states she was walking and lost her balance which resulted in the fall. On 7/15 patient underwent ORIF right hip. 7/18 patient underwent EGD with PEG placement. On7/25 patient was transferred to [facility] for respiratory failure, aspiration pneumonia and left lower lobe atelectasis. Patient COVID+ and now with increased O2 demands. Patient    ASSESSMENT/PLAN:     Pertinent Hospital Diagnoses     Acute hypoxemic respiratory failure 2/2 COVID-19  Hip fracture requiring operative repair, left, open type I or II, initial encounter s/p replacement  Severe protein-calorie malnutrition    Palliative Care Encounter / Counseling Regarding Goals of Care  Please see detailed goals of care discussion as below  At this time, Jolanta Ochoa, Does Not have capacity for medical decision-making.  Capacity is time limited and situation/question specific  During encounter Ricardo Bardales was surrogate medical decision-maker  Outcome of goals of care meeting:   Family will discuss comfort care options; would like to see how she progresses over the next 12-24 hours  Continue current medical management   Code status Limited DNR-CCA/DNI  Advanced Directives: no POA or living will in [facility]  Surrogate/Legal NOK:  Stacy Yeoy (daughter) [address] (daughter) [phone]  Radha Abbasi (daughter) [phone]    Spiritual assessment: no spiritual distress identified  Bereavement and grief: to be determined  Referrals to: none today    SUBJECTIVE:     Current medical issues leading to Palliative Medicine involvement include   Active Hospital Problems    Diagnosis Date Noted    Acute encephalopathy [G93.40] 07/24/2022     Priority: Medium    Acute hypoxemic respiratory failure due to COVID-19 ([facility] [U07.1, J96.01] 07/24/2022     Priority: Medium    Aspiration pneumonia ([facility] [J69.0] 07/24/2022     Priority: Medium    Severe protein-calorie malnutrition ([facility] [E43] 07/18/2022     Priority: Medium    Hip fracture requiring operative repair, left, open type I or II, initial encounter ([facility] [S72.002B] 07/13/2022     Priority: Medium       Details of Conversation:    Chart reviewed. Update received from bedside nurse. Spoke with daughters, Taylor Ceron and Johnny, on the phone. Role of palliative medicine introduced. Daughter states that their mom has no living will or POA paperwork. Discussed current condition regarding respiratory status and increased O2 needs. Discussion regarding goals of care and CODE STATUS options. In-depth discussion regarding comfort care and hospice. Daughters are receptive to information. After further discussion daughters states they would like to see how their mother clinically progresses over the next 12 to 24 hours prior to making any decisions regarding CODE STATUS. At this time family wishes for patient to remain a limited code-DNR CCA/DNI. Emotional support given and all questions addressed. Will follow for ongoing goals of care discussion as well as support for the patient and family.     OBJECTIVE:   Prognosis: Guarded    Physical Exam:  BP (!) 127/49   Pulse 91   Temp (!) 96.5 °F (35.8 °C) (Axillary)   Resp (!) 54   Ht 5' 2\" (1.575 m)   Wt 110 lb (49.9 kg)   SpO2 94%   BMI 20.12 kg/m²     Due to the current effort to prevent transmission of COVID-19 and also the need to preserve PPE for other caregivers, a face-to-face encounter with this patient was not performed. That being said, all recent and relevant records and diagnostic tests were reviewed, including laboratory results and imaging. Please reference any relevant documentation elsewhere. Care will be coordinated with primary services. Objective data reviewed: labs, images, records, medication use, vitals, and chart    Discussed patient and the plan of care with the other IDT members: Palliative Medicine IDT Team, Floor Nurse, and Family    Time/Communication  Greater than 50% of time spent, total 50 minutes in counseling and coordination of care at the bedside regarding goals of care. Thank you for allowing Palliative Medicine to participate in the care of Luan Loyola.

## 2022-07-28 NOTE — PROGRESS NOTES
PROGRESS NOTE  By Maddi Mcdermott M.D. [facility]  Dr. Isra Houser M.D.,  Dr. Ade Fitzgerald M.D.,   Dr. Viridiana Miller D.O.,  Dr. Braden Eric M.D.,  Dr. Julian Guevara D.O.,  Steffanie Michelle D.O. [place]  80 y.o.  female    Patient not seen and examined in face-to-face encounter secondary to COVID-19 positive status in an effort to limit transmission/exposure and to preserve PPE. Pertinent notes/labs reviewed. Discussed with healthcare team  Respiratory failure requiring bronchoscopy 7/25. Persistent respiratory failure requiring alternating NIPPV/high flow oxygen  H&H appears to have stabilized         OBJECTIVE:    BP (!) 127/49   Pulse 91   Temp (!) 96.5 °F (35.8 °C) (Axillary)   Resp (!) 54   Ht 5' 2\" (1.575 m)   Wt 110 lb (49.9 kg)   SpO2 94%   BMI 20.12 kg/m²         DATA:    Monitor data reviewed .     Stool (measured) : 0 mL  Lab Results   Component Value Date/Time    WBC 13.3 07/28/2022 05:06 AM    RBC 2.72 07/28/2022 05:06 AM    HGB 8.1 07/28/2022 05:06 AM    HCT 27.2 07/28/2022 05:06 AM    .0 07/28/2022 05:06 AM    MCH 29.8 07/28/2022 05:06 AM    MCHC 29.8 07/28/2022 05:06 AM    RDW 17.4 07/28/2022 05:06 AM     07/28/2022 05:06 AM    MPV 10.5 07/28/2022 05:06 AM     Lab Results   Component Value Date/Time     07/28/2022 05:06 AM    K 4.5 07/28/2022 05:06 AM     07/28/2022 05:06 AM    CO2 26 07/28/2022 05:06 AM    BUN 70 07/28/2022 05:06 AM    CREATININE 0.9 07/28/2022 05:06 AM    CALCIUM 8.4 07/28/2022 05:06 AM    PROT 5.4 07/28/2022 05:06 AM    LABALBU 2.7 07/28/2022 05:06 AM    BILITOT 0.5 07/28/2022 05:06 AM    ALKPHOS 108 07/28/2022 05:06 AM    AST 41 07/28/2022 05:06 AM    ALT 31 07/28/2022 05:06 AM     No results found for: LIPASE  No results found for: AMYLASE      ASSESSMENT/PLAN:  Patient Active Problem List   Diagnosis    Degenerative arthropathy of spinal facet joint, Multilevel of the cervical spine    DDD (degenerative disc disease), cervicalC6-C7    Spondylosis, lumbosacral    Lumbar facet arthropathy    Lumbar spinal stenosis    Osteoarthrosis, hip    Knee pain    Facet syndrome right C2-C6     Degenerative osteoarthrosis right C2-C6    Protruded cervical disc    Cervical radiculopathy    Neural foraminal stenosis of lumbar spine    Neural foraminal stenosis of cervical spine    Osteoarthritis of lumbar spine, degenerative with facet syndrome    Hip fracture requiring operative repair, left, open type I or II, initial encounter ([facility]    Severe protein-calorie malnutrition ([facility]    Acute encephalopathy    Acute hypoxemic respiratory failure due to COVID-19 ([facility]    Aspiration pneumonia ([facility]     1. Dysphagia/Aspiration   -S/P PEG 7/18  -Tube feedings per admitting service     2. Anemia   -New anemia first noted in July of this year  -Iron deficient/normocytic  -H&H appears stabilized   -No evidence of overt bleed  -Pending FOBT   -See discussion below regarding endoscopies     3. Constipation  -Family reports bowel movement  -Laxatives/stool softeners via PEG tube     Patient remains in significant respiratory failure requiring noninvasive positive pressure ventilation. At this time patient will be at extremely high risk for further respiratory decompensation and likely will require to be intubated and mechanically ventilated for procedure. Patient is very likely to have prolonged ventilator course. Given patient advanced age, comorbidities, respiratory failure which places her at very high risk for adverse events with endoscopy/sedation, will hold off colonoscopy at this time unless overt bleed with precipitous drop in H&H - in this case patient will need to be further discussed with her family/DPOA in regards to goals of care. Given patient overall condition/age, further de-escalation of treatment may be warranted. Defer discussion to admitting/CCM.      Amadou Crouch MD  7/28/2022  2:29 PM    NOTE:  This report was transcribed using voice recognition software. Every effort was made to ensure accuracy; however, inadvertent computerized transcription errors may be present.

## 2022-07-28 NOTE — PROGRESS NOTES
Speech Language Pathology      NAME:  Deepti Miranda  :  10/17/1928  DATE: 2022  ROOM:  [place] [address]-    Patient seen for dysphagia therapy 10 minutes. Patient with limited participation during dysphagia therapy will continue POC    Closed fracture of right hip, initial encounter [facility]) [S72.001A]  Hip fracture requiring operative repair, left, open type I or II, initial encounter ([facility] [S72.002B]  Fall on same level from slipping, tripping or stumbling, initial encounter [W01. 0XXA]  Traumatic rhabdomyolysis, initial encounter ([facility] Richирина Scooby. 6XXA]    [id number]  dysphagia tx    Rudell Cogan MSCCC/SLP  Speech Language Pathologist  [phone]

## 2022-07-29 NOTE — PROGRESS NOTES
CRITICAL CARE PROGRESS NOTE    I met with the patient's daughters, we discussed the patient's critical condition, the fact that she has COVID and severe hypoxemia, delirium trying to remove BPAP and other form of oxygen supplementation. I told them Ms Michell Kenny is at the end of her life. I explained them we are giving her pain medication, sedation and an antipsychotic for comfort as she is at the end of her life. I asked what would she want for herself at this moment? Did she talk to you about her end of life? Did she witness any family member/friend end of life? They state they did not discuss end of life and she did not participate on any family end of life, they think she would like to stay on BPAP and die on the machine. They have family members coming from out of town to say goodbye. We will continue haloperidol/opioid/precedex for comfort    We will continue current management but will change to full comfort care if she worsens.      Rocco Fulton MD  Pulmonary and Critical Care Medicine

## 2022-07-29 NOTE — PROGRESS NOTES
[facility]  [facility]  Progress Note    NAME: Gypsy Garza  MR:  [id number]  :   10/17/1928  DATE OF SERVICE:22    This is a face to face encounter with Gypsy Garza 80 y.o. female on 22  Elements of this note, including Diagnosis,  Interval History, Past Medical/Surgical/Family/Social Histories, ROS, physical exam, and Assessment and Plan were copied and pasted from Previous. Updates have been made where noted and reflect current exam and medical decision making from the DOS of this encounter. CHIEF COMPLAINT     ID following for   Chief Complaint   Patient presents with    Fall     HISTORY OF PRESENT ILLNESS     Pt seen and examined  22  In icu hypothermia  bipap  S/p bronch  cx oxidase+  Wbc11.8.3 cr0.7      Patient is tolerating medications. No reported adverse drug reactions. REVIEW OF SYSTEMS     As stated above in the chief complaint, otherwise negative.   CURRENT MEDICATIONS      cloNIDine  1 patch TransDERmal Weekly    enoxaparin  40 mg SubCUTAneous Daily    insulin lispro  0-4 Units SubCUTAneous Q6H    insulin lispro  0-4 Units SubCUTAneous Nightly    piperacillin-tazobactam  3,375 mg IntraVENous Q8H    acetylcysteine  4 mL Inhalation TID    sodium chloride flush  5-40 mL IntraVENous 2 times per day    heparin flush  3 mL IntraVENous 2 times per day    anidulafungin  100 mg IntraVENous Q24H    ipratropium-albuterol  1 ampule Inhalation Q4H    dexamethasone  6 mg IntraVENous Q24H    polyethylene glycol  17 g Per G Tube BID    pantoprazole  40 mg IntraVENous Daily    aspirin  81 mg Oral BID WC     Continuous Infusions:   dextrose      dexmedetomidine HCl in NaCl 0.3 mcg/kg/hr (22 1308)    sodium chloride      sodium chloride 50 mL/hr at 22 1516    sodium chloride Stopped (22 0746)     PRN Meds:HYDROmorphone, glucose, dextrose bolus **OR** dextrose bolus, glucagon (rDNA), dextrose, haloperidol lactate, sodium chloride flush, sodium chloride, heparin flush, bisacodyl, sodium chloride flush, sodium chloride    PHYSICAL EXAM     BP (!) 156/96   Pulse (!) 124   Temp 97.4 °F (36.3 °C)   Resp (!) 44   Ht 5' 2\" (1.575 m)   Wt 110 lb (49.9 kg)   SpO2 95%   BMI 20.12 kg/m²   Temp  Av.7 °F (35.9 °C)  Min: 95.5 °F (35.3 °C)  Max: 97.7 °F (36.5 °C)  Constitutional:  The patient is  on  bipap  Skin:    Warm and dry. No rash   HEENT:     At/nc  Chest:    Symmetrical expansion. Dec bs bases bipap  Cardiovascular:  S1 and S2 are rhythmic and regular. Abdomen:   Positive bowel sounds to auscultation. Benign to palpation.    Extremities:    Edema thigh bruises right hip dressed staples out has seepage   CNS    Somnolent   Lines: picc       DIAGNOSTIC RESULTS   Radiology:    Recent Labs     22  0506 22  0434   WBC 18.2* 13.3* 11.8*   RBC 2.38* 2.72* 2.90*   HGB 7.1* 8.1* 8.6*   HCT 23.4* 27.2* 28.7*   MCV 98.3 100.0* 99.0   MCH 29.8 29.8 29.7   MCHC 30.3* 29.8* 30.0*   RDW 16.8* 17.4* 17.4*   * 436 427   MPV 10.3 10.5 10.2       Recent Labs     22  0506 22  0434    142 140   K 5.0 4.5 4.2    106 103   CO2 28    BUN 77* 70* 57*   CREATININE 1.0 0.9 0.7   GLUCOSE 185* 177* 231*   PROT 5.1* 5.4* 5.3*   LABALBU 2.3* 2.7* 2.8*   CALCIUM 8.3* 8.4* 8.5*   BILITOT 0.6 0.5 0.6   ALKPHOS 81 108* 121*   AST 38* 41* 37*   ALT 18 31 32       Lab Results   Component Value Date    CRP 16.1 (H) 2022    CRP 12.6 (H) 2022     Lab Results   Component Value Date    SEDRATE 12 2013     Recent Labs     22  0448 22  0506 22  0434   AST 38* 41* 37*   ALT 18 31 32       Microbiology:      FINAL IMPRESSION    Pt had   Chief Complaint   Patient presents with    Fall    Admitted for Closed fracture of right hip, initial encounter ([facility] [S72.001A]  Hip fracture requiring operative repair, left, open type I or II, initial encounter ([facility] [S72.002B]  Fall on same level from slipping, tripping or stumbling, initial encounter [W01. 0XXA]  Traumatic rhabdomyolysis, initial encounter ([facility] Alex Malhotra. 6XXA]  On treatment for   Leukocytosis  better   Covid +  7/24 S/P toci   HCAP vs aspiration LLL oxidase+  Cxry Worsening of the multifocal bilateral airspace disease when compared to   patient's prior studies. 7/25/ s/p bronch  7/15 mucous plug  RIGHT INTROCHANTERIC CEPHALOMEDULLARY NAIL  INSERTION  Rigth thigh hematoma   UTI s/p ceftriaxone     cont currrent POC    piperacillin-tazobactam (ZOSYN) 3,375 mg in dextrose 5 % 50 mL IVPB extended infusion (mini-bag), Q8H    anidulafungin (ERAXIS) 100 mg in dextrose 5 % 130 mL IVPB, Q24H    dexamethasone (DECADRON) injection 6 mg, Q24H    Check blood cx      Imaging and labs were reviewed per medical records.         Electronically signed by Lianne Adams MD on 7/29/2022 at 2:41 PM

## 2022-07-29 NOTE — PLAN OF CARE
Problem: Chronic Conditions and Co-morbidities  Goal: Patient's chronic conditions and co-morbidity symptoms are monitored and maintained or improved  Recent Flowsheet Documentation  Taken 7/29/2022 0800 by Lori Gold 34 - Patient's Chronic Conditions and Co-Morbidity Symptoms are Monitored and Maintained or Improved:   Monitor and assess patient's chronic conditions and comorbid symptoms for stability, deterioration, or improvement   Collaborate with multidisciplinary team to address chronic and comorbid conditions and prevent exacerbation or deterioration   Update acute care plan with appropriate goals if chronic or comorbid symptoms are exacerbated and prevent overall improvement and discharge     Problem: Skin/Tissue Integrity - Adult  Goal: Skin integrity remains intact  Recent Flowsheet Documentation  Taken 7/29/2022 0800 by Makenna Hector RN  Skin Integrity Remains Intact:   Monitor for areas of redness and/or skin breakdown   Every 4-6 hours minimum: Change oxygen saturation probe site   Every 4-6 hours: If on nasal continuous positive airway pressure, respiratory therapy assesses nares and determine need for appliance change or resting period   Assess vascular access sites hourly     Problem: Skin/Tissue Integrity - Adult  Goal: Oral mucous membranes remain intact  Recent Flowsheet Documentation  Taken 7/29/2022 0800 by Makenna Hector RN  Oral Mucous Membranes Remain Intact:   Assess oral mucosa and hygiene practices   Implement preventative oral hygiene regimen   Implement oral medicated treatments as ordered     Problem: Skin/Tissue Integrity - Adult  Goal: Incisions, wounds, or drain sites healing without S/S of infection  Recent Flowsheet Documentation  Taken 7/29/2022 0800 by Makenna Hector RN  Incisions, Wounds, or Drain Sites Healing Without Sign and Symptoms of Infection:   ADMISSION and DAILY: Assess and document risk factors for pressure ulcer development   TWICE DAILY: Assess and document skin integrity   TWICE DAILY: Assess and document dressing/incision, wound bed, drain sites and surrounding tissue   Implement wound care per orders   Initiate isolation precautions as appropriate   Initiate pressure ulcer prevention bundle as indicated     Problem: Musculoskeletal - Adult  Goal: Maintain proper alignment of affected body part  Recent Flowsheet Documentation  Taken 7/29/2022 0800 by Megan Gutierrez RN  Maintain proper alignment of affected body part:   Support and protect limb and body alignment per provider's orders   Instruct and reinforce with patient and family use of appropriate assistive device and precautions (e.g. spinal or hip dislocation precautions)     Problem: Infection - Adult  Goal: Absence of infection at discharge  Recent Flowsheet Documentation  Taken 7/29/2022 0800 by Megan Gutierrez RN  Absence of infection at discharge:   Assess and monitor for signs and symptoms of infection   Monitor lab/diagnostic results   Monitor all insertion sites i.e., indwelling lines, tubes and drains   Identify and instruct in appropriate isolation precautions for identified infection/condition   Instruct and encourage patient and family to use good hand hygiene technique   Administer medications as ordered     Problem: Infection - Adult  Goal: Absence of infection during hospitalization  Recent Flowsheet Documentation  Taken 7/29/2022 0800 by Megan Gutierrez RN  Absence of infection during hospitalization:   Assess and monitor for signs and symptoms of infection   Monitor lab/diagnostic results   Monitor all insertion sites i.e., indwelling lines, tubes and drains   Administer medications as ordered   Instruct and encourage patient and family to use good hand hygiene technique   Identify and instruct in appropriate isolation precautions for identified infection/condition     Problem: Infection - Adult  Goal: Absence of fever/infection during anticipated neutropenic period  Recent Flowsheet Documentation  Taken 7/29/2022 0800 by Ling Dietz RN  Absence of fever/infection during anticipated neutropenic period: Monitor white blood cell count     Problem: Safety - Medical Restraint  Goal: Remains free of injury from restraints (Restraint for Interference with Medical Device)  Description: INTERVENTIONS:  1. Determine that other, less restrictive measures have been tried or would not be effective before applying the restraint  2. Evaluate the patient's condition at the time of restraint application  3. Inform patient/family regarding the reason for restraint  4.  Q2H: Monitor safety, psychosocial status, comfort, nutrition and hydration  Recent Flowsheet Documentation  Taken 7/29/2022 0800 by Ling Dietz RN  Remains free of injury from restraints (restraint for interference with medical device): Determine that other, less restrictive measures have been tried or would not be effective before applying the restraint  Taken 7/28/2022 2000 by Violet Sanford RN  Remains free of injury from restraints (restraint for interference with medical device): Every 2 hours: Monitor safety, psychosocial status, comfort, nutrition and hydration

## 2022-07-29 NOTE — PROGRESS NOTES
Department of Internal Medicine  General Internal Medicine  Attending Progress Note  Chief Complaint   Patient presents with    Fall     SUBJECTIVE:    On bipap    OBJECTIVE      Medications    Current Facility-Administered Medications: cloNIDine (CATAPRES) 0.2 MG/24HR 1 patch, 1 patch, TransDERmal, Weekly  HYDROmorphone HCl PF (DILAUDID) injection 0.25 mg, 0.25 mg, IntraVENous, Q3H PRN  enoxaparin (LOVENOX) injection 40 mg, 40 mg, SubCUTAneous, Daily  insulin lispro (HUMALOG) injection vial 0-4 Units, 0-4 Units, SubCUTAneous, Q6H  insulin lispro (HUMALOG) injection vial 0-4 Units, 0-4 Units, SubCUTAneous, Nightly  glucose chewable tablet 16 g, 4 tablet, Oral, PRN  dextrose bolus 10% 125 mL, 125 mL, IntraVENous, PRN **OR** dextrose bolus 10% 250 mL, 250 mL, IntraVENous, PRN  glucagon (rDNA) injection 1 mg, 1 mg, SubCUTAneous, PRN  dextrose 10 % infusion, , IntraVENous, Continuous PRN  calcium gluconate 1,000 mg in dextrose 5 % 100 mL IVPB, 1,000 mg, IntraVENous, Once  haloperidol lactate (HALDOL) injection 2 mg, 2 mg, IntraVENous, Q6H PRN  piperacillin-tazobactam (ZOSYN) 3,375 mg in dextrose 5 % 50 mL IVPB extended infusion (mini-bag), 3,375 mg, IntraVENous, Q8H  acetylcysteine (MUCOMYST) 10 % solution 400 mg, 4 mL, Inhalation, TID  dexmedetomidine (PRECEDEX) 400 mcg in sodium chloride 0.9 % 100 mL infusion, 0.2 mcg/kg/hr, IntraVENous, Continuous  sodium chloride flush 0.9 % injection 5-40 mL, 5-40 mL, IntraVENous, 2 times per day  sodium chloride flush 0.9 % injection 5-40 mL, 5-40 mL, IntraVENous, PRN  0.9 % sodium chloride infusion, , IntraVENous, PRN  heparin flush 100 UNIT/ML injection 300 Units, 3 mL, IntraVENous, 2 times per day  heparin flush 100 UNIT/ML injection 300 Units, 3 mL, IntraCATHeter, PRN  [COMPLETED] anidulafungin (ERAXIS) 200 mg in dextrose 5 % 260 mL IVPB, 200 mg, IntraVENous, Once **AND** anidulafungin (ERAXIS) 100 mg in dextrose 5 % 130 mL IVPB, 100 mg, IntraVENous, Q24H  ipratropium-albuterol (DUONEB) nebulizer solution 1 ampule, 1 ampule, Inhalation, Q4H  0.9 % sodium chloride infusion, , IntraVENous, Continuous  dexamethasone (DECADRON) injection 6 mg, 6 mg, IntraVENous, Q24H  polyethylene glycol (GLYCOLAX) packet 17 g, 17 g, Per G Tube, BID  bisacodyl (DULCOLAX) suppository 10 mg, 10 mg, Rectal, Daily PRN  sodium chloride flush 0.9 % injection 5-40 mL, 5-40 mL, IntraVENous, PRN  0.9 % sodium chloride infusion, 25 mL, IntraVENous, PRN  pantoprazole (PROTONIX) injection 40 mg, 40 mg, IntraVENous, Daily  aspirin chewable tablet 81 mg, 81 mg, Oral, BID WC  Physical    VITALS:  BP (!) 152/104   Pulse (!) 103   Temp (!) 95.9 °F (35.5 °C) (Axillary)   Resp (!) 54   Ht 5' 2\" (1.575 m)   Wt 110 lb (49.9 kg)   SpO2 100%   BMI 20.12 kg/m²   CONSTITUTIONAL:  awake and alert  EYES:  extra-ocular muscles intact and vision intact  ENT:  normocepalic, without obvious abnormality  NECK:  supple, symmetrical, trachea midline  LUNGS:  no increased work of breathing, no retractions, and no crackles or wheezing  CARDIOVASCULAR:  normal apical pulses and normal S1 and S2  ABDOMEN:  normal bowel sounds, non-distended, and non-tender  MUSCULOSKELETAL:  there is no redness, warmth, or swelling of the joints  NEUROLOGIC:  Mental Status Exam:  Level of Alertness:   awake  Orientation:   person, place, time  SKIN:  no bruising or bleeding  Data    CBC:   Lab Results   Component Value Date/Time    WBC 11.8 07/29/2022 04:34 AM    RBC 2.90 07/29/2022 04:34 AM    HGB 8.6 07/29/2022 04:34 AM    HCT 28.7 07/29/2022 04:34 AM    MCV 99.0 07/29/2022 04:34 AM    MCH 29.7 07/29/2022 04:34 AM    MCHC 30.0 07/29/2022 04:34 AM    RDW 17.4 07/29/2022 04:34 AM     07/29/2022 04:34 AM    MPV 10.2 07/29/2022 04:34 AM     BMP:    Lab Results   Component Value Date/Time     07/29/2022 04:34 AM    K 4.2 07/29/2022 04:34 AM     07/29/2022 04:34 AM    CO2 27 07/29/2022 04:34 AM    BUN 57 07/29/2022 04:34 AM    LABALBU 2.8 07/29/2022 04:34 AM    CREATININE 0.7 07/29/2022 04:34 AM    CALCIUM 8.5 07/29/2022 04:34 AM    GFRAA >60 07/29/2022 04:34 AM    LABGLOM >60 07/29/2022 04:34 AM    GLUCOSE 231 07/29/2022 04:34 AM       ASSESSMENT AND PLAN      1. Hip fracture requiring operative repair, left, open type I or II, initial encounter     2. Severe protein-calorie malnutrition     3. Acute encephalopathy    4. Acute hypoxemic respiratory failure due to COVID-19    5. Aspiration pneumonia:    6. Anemia due to blood loss: post op related. Plans:  Bipap management per ICU team  Continue to monitor hgb  Appreciate palliative team recommendation  Continue abx as prescribed  Monitor K, Hyperkalemia has resolved.    Continue tube feeding and monitor residuals

## 2022-07-29 NOTE — PROGRESS NOTES
PROGRESS NOTE  By Daren Juan M.D. [facility]  Dr. Micha Brasher M.D.,  Dr. Alfredito Hugo M.D.,   Dr. Clifford Gaines D.O.,  Dr. Benjamin Sin M.D.,  Dr. Alfredito Newton D.O.,  [facility], D.O. Jeremias Starrisker  80 y.o.  female    Patient not seen and examined in face-to-face encounter secondary to COVID-19 positive status in an effort to limit transmission/exposure and to preserve PPE. Pertinent notes/labs reviewed. Discussed with healthcare team  Respiratory failure requiring bronchoscopy 7/25. Persistent respiratory failure requiring alternating NIPPV/high flow oxygen  H&H appears to have stabilized       OBJECTIVE:    BP (!) 161/89   Pulse (!) 103   Temp (!) 95.5 °F (35.3 °C) (Axillary)   Resp (!) 40   Ht 5' 2\" (1.575 m)   Wt 110 lb (49.9 kg)   SpO2 100%   BMI 20.12 kg/m²       DATA:    Monitor data reviewed -sinus rhythm noted.     Stool (measured) : 0 mL  Lab Results   Component Value Date/Time    WBC 11.8 07/29/2022 04:34 AM    RBC 2.90 07/29/2022 04:34 AM    HGB 8.6 07/29/2022 04:34 AM    HCT 28.7 07/29/2022 04:34 AM    MCV 99.0 07/29/2022 04:34 AM    MCH 29.7 07/29/2022 04:34 AM    MCHC 30.0 07/29/2022 04:34 AM    RDW 17.4 07/29/2022 04:34 AM     07/29/2022 04:34 AM    MPV 10.2 07/29/2022 04:34 AM     Lab Results   Component Value Date/Time     07/29/2022 04:34 AM    K 4.2 07/29/2022 04:34 AM     07/29/2022 04:34 AM    CO2 27 07/29/2022 04:34 AM    BUN 57 07/29/2022 04:34 AM    CREATININE 0.7 07/29/2022 04:34 AM    CALCIUM 8.5 07/29/2022 04:34 AM    PROT 5.3 07/29/2022 04:34 AM    LABALBU 2.8 07/29/2022 04:34 AM    BILITOT 0.6 07/29/2022 04:34 AM    ALKPHOS 121 07/29/2022 04:34 AM    AST 37 07/29/2022 04:34 AM    ALT 32 07/29/2022 04:34 AM     No results found for: LIPASE  No results found for: AMYLASE      ASSESSMENT/PLAN:  Patient Active Problem List   Diagnosis    Degenerative arthropathy of spinal facet joint, Multilevel of the cervical spine    DDD (degenerative disc disease), cervicalC6-C7    Spondylosis, lumbosacral    Lumbar facet arthropathy    Lumbar spinal stenosis    Osteoarthrosis, hip    Knee pain    Facet syndrome right C2-C6     Degenerative osteoarthrosis right C2-C6    Protruded cervical disc    Cervical radiculopathy    Neural foraminal stenosis of lumbar spine    Neural foraminal stenosis of cervical spine    Osteoarthritis of lumbar spine, degenerative with facet syndrome    Hip fracture requiring operative repair, left, open type I or II, initial encounter ([facility] [address]    Severe protein-calorie malnutrition ([facility] [address]    Acute encephalopathy    Acute hypoxemic respiratory failure due to COVID-19 ([facility] [address]    Aspiration pneumonia ([facility] [address]     1. Dysphagia/Aspiration   -S/P PEG 7/18  -Tube feedings per admitting service     2. Anemia   -New anemia first noted in July of this year  -Iron deficient/normocytic  -H&H appears stabilized   -No evidence of overt bleed  -Pending FOBT   -See discussion below regarding endoscopies     3. Constipation  -Family reports bowel movement  -Laxatives/stool softeners via PEG tube    4. Respiratory failure/COVID-19 infection  -Per admitting/pulmonology     Patient remains in significant respiratory failure requiring noninvasive positive pressure ventilation. At this time patient will be at extremely high risk for further respiratory decompensation and likely will require to be intubated and mechanically ventilated for procedure. Patient is very likely to have prolonged ventilator course. Given patient advanced age, comorbidities, respiratory failure which places her at very high risk for adverse events with endoscopy/sedation, will hold off colonoscopy at this time unless overt bleed with precipitous drop in H&H - in this case patient will need to be further discussed with her family/DPOA in regards to goals of care. Given patient overall condition/age, further de-escalation of treatment may be warranted.   Defer discussion to admitting/CCM. Syed Cantrell MD  7/29/2022  11:58 AM    NOTE:  This report was transcribed using voice recognition software. Every effort was made to ensure accuracy; however, inadvertent computerized transcription errors may be present.

## 2022-07-29 NOTE — PROGRESS NOTES
CRITICAL CARE PROGRESS NOTE    The patient's case was discussed in multidisciplinary rounds including critical care specialist, nursing, RT and pharmacy. Her evaluation is as follows:     80year old person with PMH as described below admitted to ICU for management of     --Hypertensive, starting clinidine patch  --Severely hypoxemic on BPAP  --Patient confused and screaming \"I want to die\", hydromorphone started for management of dyspnea as the patient is tachycardic and tachypneic.    --Receiving sedation with precedex gtt    A/P:  1) Acute hypoxemic respiratory failure secondary to severe COVID-19 pneumonia and ARDS  --Oxygen supplementation to maintain sats > 89%, can use Vapotherm or BPAP   --Prone position recommended  --Antimicrobial regimen: Piperacillin-tazobactam and anidulafungin  --Antiviral regimen: Dexamethasone 6 mg X 10 days  --Cultures reviewed- GNR  oxidase positive  pneumococcus/legionella antigens and mycoplasma/HIV antibodies  --Anticoagulation: Enoxaparin    Hip fracture  --Surgical management    Hyperglycemia  --Management with insulin administration     Acute kidney injury secondary to Sepsis   --Avoid nephrotoxins and dose medications according GFR    Delirium  --management with haloperidol and dexmedethomidine    Goals of care: Limited Code    BP (!) 180/90   Pulse (!) 112   Temp (!) 95.9 °F (35.5 °C) (Axillary)   Resp (!) 60   Ht 5' 2\" (1.575 m)   Wt 110 lb (49.9 kg)   SpO2 96%   BMI 20.12 kg/m²   General: Awake, oriented to person, agitated and trying to remove BPAP  HEENT: No head lesions, PERRL, EOMI, mouth without lesions, no nasal lesions, no cervical adenopathy palpated  Respiratory: Lungs with diminished breath sounds bilaterally, no adventitious sounds auscultated, no accessory muscle use while on BPAP  CV: Regular rate, no murmurs, no JVD, no leg edema  Abdomen: Soft, non tender, + bowel sounds, no lesions  Skin: Hydrated, adequate turgor, no rash, capillary refill <2 glucagon (rDNA), dextrose, haloperidol lactate, sodium chloride flush, sodium chloride, heparin flush, bisacodyl, sodium chloride flush, sodium chloride    OBJECTIVE:  Vitals:    07/29/22 1411   BP: (!) 156/96   Pulse: (!) 124   Resp: (!) 44   Temp:    SpO2:      FiO2 : 60 %  O2 Flow Rate (L/min): 50 L/min  O2 Device: PAP (positive airway pressure)        LABS:  WBC   Date Value Ref Range Status   07/29/2022 11.8 (H) 4.5 - 11.5 E9/L Final   07/28/2022 13.3 (H) 4.5 - 11.5 E9/L Final   07/27/2022 18.2 (H) 4.5 - 11.5 E9/L Final     Hemoglobin   Date Value Ref Range Status   07/29/2022 8.6 (L) 11.5 - 15.5 g/dL Final   07/28/2022 8.1 (L) 11.5 - 15.5 g/dL Final   07/27/2022 7.1 (L) 11.5 - 15.5 g/dL Final     Hematocrit   Date Value Ref Range Status   07/29/2022 28.7 (L) 34.0 - 48.0 % Final   07/28/2022 27.2 (L) 34.0 - 48.0 % Final   07/27/2022 23.4 (L) 34.0 - 48.0 % Final     MCV   Date Value Ref Range Status   07/29/2022 99.0 80.0 - 99.9 fL Final   07/28/2022 100.0 (H) 80.0 - 99.9 fL Final   07/27/2022 98.3 80.0 - 99.9 fL Final     Platelets   Date Value Ref Range Status   07/29/2022 427 130 - 450 E9/L Final   07/28/2022 436 130 - 450 E9/L Final   07/27/2022 451 (H) 130 - 450 E9/L Final     Sodium   Date Value Ref Range Status   07/29/2022 140 132 - 146 mmol/L Final   07/28/2022 142 132 - 146 mmol/L Final   07/27/2022 139 132 - 146 mmol/L Final     Potassium   Date Value Ref Range Status   07/29/2022 4.2 3.5 - 5.0 mmol/L Final   07/28/2022 4.5 3.5 - 5.0 mmol/L Final   07/27/2022 5.0 3.5 - 5.0 mmol/L Final     Chloride   Date Value Ref Range Status   07/29/2022 103 98 - 107 mmol/L Final   07/28/2022 106 98 - 107 mmol/L Final   07/27/2022 103 98 - 107 mmol/L Final     CO2   Date Value Ref Range Status   07/29/2022 27 22 - 29 mmol/L Final   07/28/2022 26 22 - 29 mmol/L Final   07/27/2022 28 22 - 29 mmol/L Final     BUN   Date Value Ref Range Status   07/29/2022 57 (H) 6 - 23 mg/dL Final   07/28/2022 70 (H) 6 - 23 mg/dL sq.m.  Results valid for patients 18 years and older. 07/27/2022 52 >=60 mL/min/1.73 Final     Comment:     Chronic Kidney Disease: less than 60 ml/min/1.73 sq.m. Kidney Failure: less than 15 ml/min/1.73 sq.m. Results valid for patients 18 years and older. GFR    Date Value Ref Range Status   07/29/2022 >60  Final   07/28/2022 >60  Final   07/27/2022 >60  Final     Magnesium   Date Value Ref Range Status   07/29/2022 2.1 1.6 - 2.6 mg/dL Final   07/28/2022 2.4 1.6 - 2.6 mg/dL Final   07/27/2022 2.5 1.6 - 2.6 mg/dL Final     Phosphorus   Date Value Ref Range Status   07/29/2022 2.9 2.5 - 4.5 mg/dL Final   07/28/2022 3.7 2.5 - 4.5 mg/dL Final   07/27/2022 3.7 2.5 - 4.5 mg/dL Final     Recent Labs     07/29/22  0540   PH 7.440   PO2 56.5*   PCO2 36.0   HCO3 23.9   BE 0.0   O2SAT 88.4*       RADIOLOGY:  XR CHEST PORTABLE   Final Result   1. Worsening of the multifocal bilateral airspace disease when compared to   patient's prior studies. XR CHEST PORTABLE   Final Result   Extensive bilateral airspace disease with mild interval progression at the   right upper lobe. Pleural effusions. XR CHEST PORTABLE   Final Result   Persistent bilateral pulmonary opacifications with bilateral pleural   effusions. XR CHEST PORTABLE   Final Result   Interval placement of left arm PICC terminating within the mid SVC/azygous   region without postprocedure pneumothorax. Persistent bilateral pulmonary   opacifications along with small to moderate right pleural effusion and   persistent cardiomegaly. XR CHEST PORTABLE   Final Result   No significant interval change of the past 15 hours. XR CHEST PORTABLE   Final Result   Stable bilateral pulmonary opacities. Possible right pleural effusion.          XR CHEST PORTABLE   Final Result   Increasing airspace disease and volume loss in left hemithorax suspicious at   least partial left lower lobe collapse with mucous plugging. Pulmonary consultation is recommended. XR CHEST PORTABLE   Final Result   Increasing bilateral airspace disease with bilateral pleural effusions and   moderate cardiomegaly. XR HIP RIGHT (2-3 VIEWS)   Final Result   It intact right femoral gamma nail with near anatomic alignment of femoral   fracture fragments and severe right hip osteoarthritis. CT HEAD WO CONTRAST   Final Result   No acute intracranial abnormality. Chronic microvascular ischemic changes. Small old lacunar infarcts. FL MODIFIED BARIUM SWALLOW W VIDEO   Final Result   Swallowing dysfunction as described above including aspiration of thin liquid   barium and barium nectar. Please see separate speech pathology report for full discussion of findings   and recommendations. Fluoro For Surgical Procedures   Final Result   Intraprocedural fluoroscopic spot images as above. See separate procedure   report for more information. XR CHEST PORTABLE   Final Result   Bilateral pulmonary opacities, left greater than right, likely   infectious/inflammatory. Aspiration pneumonitis would also be in the   differential diagnosis. XR HIP RIGHT (1 VIEW)   Final Result      Intertrochanteric fracture of the proximal right femur. XR FEMUR RIGHT (MIN 2 VIEWS)   Final Result   Intertrochanteric fracture of the proximal right femur         XR HIP RIGHT (2-3 VIEWS)   Final Result   Displaced right intertrochanteric fracture with varus deformity. .         XR CHEST 1 VIEW   Final Result   No acute process. CT HEAD WO CONTRAST   Final Result   No acute intracranial abnormality. CT CERVICAL SPINE WO CONTRAST   Final Result   No acute abnormality of the cervical spine. Multilevel degenerative changes.            PROBLEM LIST:  Principal Problem:    Hip fracture requiring operative repair, left, open type I or II, initial encounter [facility])  Active Problems:    Severe protein-calorie malnutrition ([facility] [address]    Acute encephalopathy    Acute hypoxemic respiratory failure due to COVID-19 [facility])    Aspiration pneumonia ([facility] [address]  Resolved Problems:    * No resolved hospital problems. *      ATTESTATION:  ICU Staff Physician note of personal involvement in Care  As the attending physician, I certify that I personally reviewed the patients history and personnally examined the patient to confirm the physical findings described above,  And that I reviewed the relevant imaging studies and available reports. I also discussed the differential diagnosis and all of the proposed management plans with the patient and individuals accompanying the patient to this visit. They had the opportunity to ask questions about the proposed management plans and to have those questions answered. This patient has a high probability of sudden, clinically significant deterioration, which requires the highest level of physician preparedness to intervene urgently. I managed/supervised life or organ supporting interventions that required frequent physician assessment. I devoted my full attention to the direct care of this patient for the amount of time indicated below. Time I spent with the family or surrogate(s) is included only if the patient was incapable of providing the necessary information or participating in medical decisions - Time devoted to teaching and to any procedures I billed separately is not included.      CRITICAL CARE TIME:  30 minutes    Gibran Pyle MD  Pulmonary and Critical Care Medicine

## 2022-07-29 NOTE — CARE COORDINATION
7/25/22 1652 CM note: COVID (+) 7/24/22. Limited code: NO TO ALL. Pt was a transfer from the 4th floor last night d/t respiratory distress. Pt has been on a continuous bipap FIO2 100% but she has not been oxygenating appropriately. Dr Tay Mackey was consulted and he is going to transfer patient to ICU with possible bronch for LLL collapse d/t mucous plugging. Pt was admitted for fall at home and had ORIF R hip on 7/15/22. Also during this admit peg tube was placed on 7/18/22 d/t aspiration. Per prior  notes, plan was to dc to [facility] but this was prior to pts covid dx and respiratory decline. Pt currently on IV zosyn, diflucan, and decadron. PIV X 2. CM will follow.  Electronically signed by Issa Hylton RN on 7/25/2022 at 5:21 PM
7/28/2022 CM transition of care: ICU,+Covid 7/24/22, bipap versus airvo with nrb- pt on 100% fio2, precedex gtt. POD #13- ORIF 7/15/22, peg 7/18/22, fob 7/25/22. Limited code status all No's, Palliative care consult for \"goals of care\". Oral vanco being stopped. Cm to follow.   Electronically signed by Nicolas Gatica RN-BC on 7/28/2022 at 10:32 AM
7/29/2022 CM transition of care; ICU,+Covid 7/24/22, bipap versus airvo with nrb- pt on 100% fio2, precedex gtt. POD #13- ORIF 7/15/22, peg 7/18/22, fob 7/25/22. Limited code status all No's, Palliative care consult for \"goals of care\". PT/OT back on consult. Cm following.  Electronically signed by Lachelle Ortiz RN-BC on 7/29/2022 at 9:32 AM
BENM for dtr Tamika explaining IMM letter and requested a call back.  Electronically signed by Brant Ga on 7/25/2022 at 8:35 AM
Patient seen and examined. No family at bedside. PEG tube in place with bumper at 3 to 3-1/2 cm. No evidence of leakage, bleeding or purulence around the tube.   Okay for tube feedings
Ss note: 7/20/2022.11:29 AM Will need RAPID COVID on day of discharge. Pt accepted at [facility] MIGUELITO DUPONT of [facility] rehab, awaiting [address] [facility]. Will need Hens and signed PATRICIA. Dtr Shaggy Barragan was updated on acceptance yesterday. Pt has new peg tube.  NOEMI Monroy
Ss note:7/14/2022.1:30 PM No covid testing. Pt presents from home w/dtr Jeanie Quintero [phone] whom is on her way back from [place] & should be home today. Met with pts other dtr Felicia Nowak [phone] for information/transition of care plan. Pt resides w/Arely in a 3 story home in which the pt has her own apt on the 3rd level & must navigate multiple steps/has railings. Pt can enter home via back patio, no steps. Pt presents w/ fall, hip fracture, awaiting surgery to be scheduled. Ольга relays PTA  pt very independent at home, uses a quad cane, has ww she wont use,  does her own laundry & assists w/ some homemaking chores. Hx of HHC & Hx of SNF placements 8 or more yrs ago at [facility] and [address] [place] [address]. PCP is Dr. Ursula Lozada. Provided/reviewed SNF list, preferences are 1. [facility] 2. [facility] then [facility] at [place]. Referrals made; will wait surgery and therapy evals and accepting SNF. Requires PRECERT under BCPINEDA, NEG RAPID COVID, Hens and signed PATRICIA.   NOEMI Agudelo
Ss note:7/15/2022.3:08 PM No covid testing. Pt resides with dtr Ariadne Pepe. Follow up visit to room and spoke with dtr Irving Cevallos, several family members present. Family provided additional SNF choices. Prefer 1. [facility] [address] [place] [phone] at [facility] 4 [facility] 5 [facility]. SW updated liaison Lisbeth Leggett that [facility] is first choice, she is following as there is not enough info on chart yet, [facility] referral made and liaison also following. Will need updated therapy notes post op, pt will require RAPID COVID, PRECERT, hens and signed carol for placement.  NOEMI Wray
Ss note:7/18/2022.12:40 PM No covid testing. Pt resides with dtr Samuel Yoon, another dtr Raciel Branch resides close by and has provided discharge planning information. Pt is hip fx, per IDR today pt for PEG TUBE placement. SNF choice preferences are 1. [facility] [address] [facility], [address] [facility] [place], 3. [address] [facility] or [facility]. Liaison Rufino Cowan with [facility] reviewing as is Benjamin Turner from [facility], will need RAPID covid, PRECERT, hens and signed PATRICIA.  NOEMI Johnson
Ss note:7/21/2022.9:50 AM Per Rivas Ruiz with [facility] has been obtained for skilled rehab and is valid through Saturday. Will need RAPID COVID on day of discharge,HENs and signed PATRICIA. Family will need notified when discharge is written.  NOEMI Jerez
detailed exam

## 2022-07-30 NOTE — PROGRESS NOTES
CRITICAL CARE PROGRESS NOTE    I had another meeting with the patient's daughter at the bedside as the patient seems to be actively dying, has severe tachypnea with RR of 60, worsening hypoxemia and agonal breathing. I recommended changing goals of care to [address], she agrees.     I ordered her morphine infusion, robinul, lorazepam.       Changed code status to [facility]    Paige Perez MD  Pulmonary and Critical Care Medicine

## 2022-07-30 NOTE — PROGRESS NOTES
Department of Internal Medicine  General Internal Medicine  Resident Progress Note  Chief Complaint   Patient presents with    Fall       SUBJECTIVE:    On Bipap.      OBJECTIVE      Medications    Current Facility-Administered Medications: metoprolol tartrate (LOPRESSOR) tablet 25 mg, 25 mg, Per NG tube, BID  HYDROmorphone HCl PF (DILAUDID) injection 0.25 mg, 0.25 mg, IntraVENous, Q3H PRN  enoxaparin (LOVENOX) injection 40 mg, 40 mg, SubCUTAneous, Daily  insulin lispro (HUMALOG) injection vial 0-4 Units, 0-4 Units, SubCUTAneous, Q6H  insulin lispro (HUMALOG) injection vial 0-4 Units, 0-4 Units, SubCUTAneous, Nightly  glucose chewable tablet 16 g, 4 tablet, Oral, PRN  dextrose bolus 10% 125 mL, 125 mL, IntraVENous, PRN **OR** dextrose bolus 10% 250 mL, 250 mL, IntraVENous, PRN  glucagon (rDNA) injection 1 mg, 1 mg, SubCUTAneous, PRN  dextrose 10 % infusion, , IntraVENous, Continuous PRN  haloperidol lactate (HALDOL) injection 2 mg, 2 mg, IntraVENous, Q6H PRN  dexmedetomidine (PRECEDEX) 400 mcg in sodium chloride 0.9 % 100 mL infusion, 0.2 mcg/kg/hr, IntraVENous, Continuous  dilTIAZem 125 mg in dextrose 5 % 125 mL infusion, 2.5-20 mg/hr, IntraVENous, Continuous  piperacillin-tazobactam (ZOSYN) 3,375 mg in dextrose 5 % 50 mL IVPB extended infusion (mini-bag), 3,375 mg, IntraVENous, Q8H  acetylcysteine (MUCOMYST) 10 % solution 400 mg, 4 mL, Inhalation, TID  sodium chloride flush 0.9 % injection 5-40 mL, 5-40 mL, IntraVENous, 2 times per day  sodium chloride flush 0.9 % injection 5-40 mL, 5-40 mL, IntraVENous, PRN  0.9 % sodium chloride infusion, , IntraVENous, PRN  heparin flush 100 UNIT/ML injection 300 Units, 3 mL, IntraVENous, 2 times per day  heparin flush 100 UNIT/ML injection 300 Units, 3 mL, IntraCATHeter, PRN  [COMPLETED] anidulafungin (ERAXIS) 200 mg in dextrose 5 % 260 mL IVPB, 200 mg, IntraVENous, Once **AND** anidulafungin (ERAXIS) 100 mg in dextrose 5 % 130 mL IVPB, 100 mg, IntraVENous, Q24H  ipratropium-albuterol (DUONEB) nebulizer solution 1 ampule, 1 ampule, Inhalation, Q4H  0.9 % sodium chloride infusion, , IntraVENous, Continuous  dexamethasone (DECADRON) injection 6 mg, 6 mg, IntraVENous, Q24H  polyethylene glycol (GLYCOLAX) packet 17 g, 17 g, Per G Tube, BID  bisacodyl (DULCOLAX) suppository 10 mg, 10 mg, Rectal, Daily PRN  sodium chloride flush 0.9 % injection 5-40 mL, 5-40 mL, IntraVENous, PRN  0.9 % sodium chloride infusion, 25 mL, IntraVENous, PRN  pantoprazole (PROTONIX) injection 40 mg, 40 mg, IntraVENous, Daily  aspirin chewable tablet 81 mg, 81 mg, Oral, BID WC  Physical    VITALS:  BP (!) 101/47   Pulse 93   Temp 97.6 °F (36.4 °C) (Axillary)   Resp (!) 44   Ht 5' 2\" (1.575 m)   Wt 115 lb (52.2 kg)   SpO2 100%   BMI 21.03 kg/m²   CONSTITUTIONAL:  on bipap  EYES:  extra-ocular muscles intact  ENT:  atraumatic  NECK:  supple, symmetrical, trachea midline  LUNGS:  tachypneic, no retractions, and clear to auscultation, anteriorly  CARDIOVASCULAR:  normal apical pulses and normal S1 and S2  ABDOMEN:  normal bowel sounds, non-distended, and non-tender  MUSCULOSKELETAL:  there is no redness, warmth, or swelling of the joints  NEUROLOGIC:  unable to assess  SKIN:    Data    CBC:   Lab Results   Component Value Date/Time    WBC 14.8 07/30/2022 04:27 AM    RBC 2.90 07/30/2022 04:27 AM    HGB 8.7 07/30/2022 04:27 AM    HCT 28.7 07/30/2022 04:27 AM    MCV 99.0 07/30/2022 04:27 AM    MCH 30.0 07/30/2022 04:27 AM    MCHC 30.3 07/30/2022 04:27 AM    RDW 18.5 07/30/2022 04:27 AM     07/30/2022 04:27 AM    MPV 10.5 07/30/2022 04:27 AM     BMP:    Lab Results   Component Value Date/Time     07/30/2022 04:27 AM    K 4.4 07/30/2022 04:27 AM     07/30/2022 04:27 AM    CO2 26 07/30/2022 04:27 AM    BUN 49 07/30/2022 04:27 AM    LABALBU 2.9 07/30/2022 04:27 AM    CREATININE 0.7 07/30/2022 04:27 AM    CALCIUM 8.6 07/30/2022 04:27 AM    GFRAA >60 07/30/2022 04:27 AM    LABGLOM >60 07/30/2022 04:27 AM    GLUCOSE 232 07/30/2022 04:27 AM       ASSESSMENT AND PLAN      1. Hip fracture requiring operative repair, left, open type I or II, initial encounter   Pt/ot when able  Anticoagulation as tolerated    2. Severe protein-calorie malnutrition on tube feeding    3. Acute encephalopathy    4. Acute hypoxemic respiratory failure due to COVID-19 [facility])  On Bipap  Management per ICU    5. Aspiration pneumonia (HCC)  Continue current abx    6. A.  Fib with RVR:  On Cardizem drip  Continue to monitor BP

## 2022-07-30 NOTE — PROGRESS NOTES
Physical Therapy    Physical Therapy RE-Evaluation/Plan of Care    Room #:  IC10/IC10-01  Patient Name: Florian White  YOB: 1928  MRN: [id number]    Date of Service: 7/30/2022     Tentative placement recommendation: subacute rehab   Equipment recommendation: To be determined      Evaluating Physical Therapist: Jenifer Schofield  #[id number]     Specific Provider Orders/Date/Referring Provider :  07/16/22 0900    PT eval and treat  Start:  07/16/22 0900,   End:  07/16/22 0900,   ONE TIME,   Standing Count:  1 Occurrences,   Reynold Lemus MD     Admitting Diagnosis:   Closed fracture of right hip, initial encounter [facility]) [S72.001A]  Hip fracture requiring operative repair, left, open type I or II, initial encounter ([address] [S72.002B]  Fall on same level from slipping, tripping or stumbling, initial encounter [W01. 0XXA]  Traumatic rhabdomyolysis, initial encounter ([address] Farrukh Avila. 6XXA]   Visit diagnosis:   Visit Diagnoses         Codes    Closed fracture of right hip, initial encounter [facility])    -  Primary S72.001A    Fall on same level from slipping, tripping or stumbling, initial encounter     W01. 0XXA    Traumatic rhabdomyolysis, initial encounter ([address]     T79. 6XXA    Closed fracture of left hip, initial encounter ([address]     S72.002A    Acute respiratory failure, unspecified whether with hypoxia or hypercapnia ([address]     J96.00        Admitted with  fall, unknown time on floor 7/13    Surgery:     right   INTROCHANTERIC CEPHALOMEDULLARY NAIL  INSERTION    Patient Active Problem List   Diagnosis    Degenerative arthropathy of spinal facet joint, Multilevel of the cervical spine    DDD (degenerative disc disease), cervicalC6-C7    Spondylosis, lumbosacral    Lumbar facet arthropathy    Lumbar spinal stenosis    Osteoarthrosis, hip    Knee pain    Facet syndrome right C2-C6     Degenerative osteoarthrosis right C2-C6    Protruded cervical disc    Cervical radiculopathy    Neural foraminal stenosis of lumbar spine    Neural foraminal stenosis of cervical spine    Osteoarthritis of lumbar spine, degenerative with facet syndrome    Hip fracture requiring operative repair, left, open type I or II, initial encounter ([facility]    Severe protein-calorie malnutrition ([facility]    Acute encephalopathy    Acute hypoxemic respiratory failure due to COVID-19 [facility])    Aspiration pneumonia ([facility]       ASSESSMENT of current deficits: Patient exhibits decreased strength, balance, endurance, range of motion, coordination, and pain    impairing functional mobility, transfers, gait , gait distance, and tolerance to activity are barriers to d/c and require skilled intervention during hospital stay to attain pre hospital level of function. Patient able to follow VC > 75% of the time during session and nursing asked to keep pt in bed for exercises only. Pt was able to perform supine exercises with AAROM and required 60L at 95% on AirVo with additional 15L NRB mask for exercises in order to maintain her SpO2 above 88%. PHYSICAL THERAPY  PLAN OF CARE       Physical therapy plan of care is established based on physician order,  patient diagnosis and clinical assessment    Current Treatment Recommendations:    -Bed Mobility: Lower extremity exercises , Upper extremity exercises , and Trunk control activities   -Sitting Balance: Incorporate reaching activities to activate trunk muscles , Hands on support to maintain midline , and Facilitate postural control in all planes   -Standing Balance: Perform strengthening exercises in standing to promote motor control with or without upper extremity support  and Instruct patient on adequate base of support to maintain balance  -Transfers: Provide instruction on proper hand and foot position for adequate transfer of weight onto lower extremities and use of gait device if needed, Cues for hand placement, technique and safety.  Provide stabilization to prevent fall , and Facilitate weight shift forward on to lower extremities and provide necessary stabilization of bilateral lower extremities   -Gait: Gait training, Standing activities to improve: base of support, weight shift, weight bearing , and Exercises to improve hip and knee control   -Endurance: Utilize Supervised activities to increase level of endurance to allow for safe functional mobility including transfers and gait     PT long term treatment goals are located in below grid    Patient and or family understand(s) diagnosis, prognosis, and plan of care. Frequency of treatments: Patient will be seen  twice daily  for therapeutic exercise, functional retraining, endurance activities, balance exercises, family and patient education.        Prior Level of Function: Patient ambulated independently and climbed steps  Rehab Potential: poor  for baseline    Past medical history:   Past Medical History:   Diagnosis Date    Arthritis     COVID-19     Fall     Fracture dislocation of left hip joint ([facility]     Osteoporosis     Paget's disease      Past Surgical History:   Procedure Laterality Date    BRONCHOSCOPY Bilateral 7/25/2022    BRONCHOSCOPY DIAGNOSTIC OR CELL [address] performed by Evangelina Bacon MD at [address] Left 7/15/2022    RIGHT INTROCHANTERIC CEPHALOMEDULLARY NAIL  INSERTION performed by Marina Thomas DO at [address] [facility]      left 2010    KNEE SURGERY      Left x 2    LUNG BIOPSY      NERVE BLOCK  11-    NERVE BLOCK  11 16 2011    right cervical paravertebral facet #2    NERVE BLOCK  11 30 2011    NERVE BLOCK  12 28 2012    cervical epidural #1    NERVE BLOCK      1-9-2013    NERVE BLOCK  1 21 13    cerv ep #3    NERVE BLOCK Right 06 12 2013    cervical paravertebral facet #1    NERVE BLOCK Right 6 24 13    cerv facet #2    NERVE BLOCK Right 07 24 2013    cervical paravertebral facet #3    NERVE BLOCK Right 1 15 14    cerv transforam #1    NERVE BLOCK Right 2/19/2014    right cervical transforaminal nerve block  #2    NERVE BLOCK  03/05/14    transforaminal nerve block right cervical #3    NERVE BLOCK Bilateral 10/29/2014    himanshu lumbar paravertebral facet  #1    NERVE BLOCK Bilateral 11/5/2014    bilateral paravertebral facet  lumbar  #2    NERVE BLOCK Bilateral 11/12/14    paravertebral facet lumbar #3    NERVE BLOCK Right 05/22/2017    transforaminal cervical #1    NERVE BLOCK Right 06/05/2017    transforaminal #2    NERVE BLOCK Bilateral 06/12/2017    lumbar paravertebral facet #1    NERVE BLOCK Bilateral 06/21/2017    Bilatera lumbar paravertebral facet block 32 l3-4 l4-5 l5-s1    NERVE BLOCK Bilateral 09/09/2020    lumbar facet block    NERVE BLOCK Bilateral 9/9/2020    BILATERAL LUMBAR FACET BLOCKS AT L3-4,L4-5,L5-S1 x2 UNDER X-RAY #1 performed by Jeanie Lenz DO at [address] [facility] [facility] [address] Bilateral 09/16/2020    lumbar facet paravertebral    NERVE BLOCK Bilateral 9/16/2020    BILATERAL LUMBAR FACET BLOCKS AT L3-4,L4-5,L5-S1 x2 UNDER X-RAY #2 performed by Jeanie Lenz DO [address] Right 09 20 2013    radiofrequency neurolysis medial branch nerve cervical    TOTAL HIP ARTHROPLASTY      Left    UPPER GASTROINTESTINAL ENDOSCOPY N/A 7/18/2022    EGD ESOPHAGOGASTRODUODENOSCOPY PEG TUBE INSERTION performed by Gabi Martinez MD at [facility]:    Precautions:  Continuous Pulse Oximetry , falls, alarm, O2, and Droplet plus/COVID-19 ,right lower extremity FWB (full weight bearing)     Social history: Patient lives with daughter in a  3 story home in [facility] [place] [facility] on 3rd level   with  multiple steps   to enter with [address]: Cee [place],       [address]   How much difficulty turning over in bed?: A Lot  How much difficulty sitting down on / standing up from a chair with arms?: Unable  How much difficulty moving from lying on back to sitting on side of bed?: Unable  How much help from another person moving to and from a bed to a chair?: Total  How much help from another person needed to walk in hospital room?: Total  How much help from another person for climbing 3-5 steps with a railing?: Total  AM-PAC Inpatient Mobility Raw Score : 7  AM-PAC Inpatient T-Scale Score : 26.42  Mobility Inpatient CMS 0-100% Score: 92.36  Mobility Inpatient CMS G-Code Modifier : CM    Nursing cleared patient for PT Re-evaluation. OBJECTIVE:   Initial Evaluation  Date: 7/17/2022 Re-Eval Date:  7/30/2022   Short Term/ Long Term   Goals   Was pt agreeable to Eval/treatment?  Yes yes    Pain Level  Unrated however with movement of  Right hip grimace and vocalized pain No number assigned to pain     Bed Mobility  Rolling: Maximal assist of 1    Supine to sit: Maximal assist of 1    Sit to supine: Maximal assist of 1    Scooting: Maximal assist of 1   Rolling: Not assessed    Supine to sit: Not assessed    Sit to supine: Not assessed    Scooting: Not assessed     *Nursing asked that pt stay in bed for supine exercises only     Rolling: ModA   Supine to sit: ModA    Sit to supine: ModA  Scooting: ModA   Transfers Sit to stand: Maximal assist of 1 anterior approach x 2 reps; attempted with walker however patient fearful; patient able to bear wt bilateral however 2 person assist for wheeled walker trf will be necessary next visit Sit to stand: Not assessed       Sit to stand: ModA    Ambulation    not assessed  Not assessed      ROM Within functional limits except Right hip 50%, pt has significant internal rotation with inability to tolerate > neutral    Increase range of motion 10% of affected joints    Strength Within functional limits  except  Right lower extremity 2-/5    Within functional limits   Balance Sitting EOB:  poor   Dynamic Standing:  poor    Sitting EOB: not assessed   Dynamic Standing: not assessed     Sitting EOB:  good    Dynamic Standing:  good wheeled walker      Patient is Alert & Oriented x person and follows one step directions  hard of hearing   Sensation:  Patient denies numbness/tingling  Edema:  yes right lower extremity   Vitals: 60L at 95% AirVo + 15L NRB mask when pt O2 dropped to 85%  Blood Pressure at rest   Blood Pressure during session     Heart Rate at rest   Heart Rate during session     SPO2 at rest 89%, no NRB SPO2 during session 85-93%     Patient education   Patient educated on role of Physical Therapy, risks of immobility, safety and plan of care,  importance of mobility while in hospital , ankle pumps, quad set and glut set for edema control, blood clot prevention, safety , and positioning for skin integrity and comfort      Patient response to education:   Pt verbalized understanding Pt demonstrated skill Pt requires further education in this area   Yes Partial Yes       Treatment:  Patient practiced and was instructed in the following treatment:    Patient assisted with supine exercises in bed. Nursing present during treatment. Pt performed supine exercises in bed and required VC and TC for proper execution along with AAROM. Pt performed HS, SLR, hip abduction, and AP 15 reps x 1 set all AAROM during session. At end of session, patient in bed with alarm call light and phone within reach,   all lines and tubes intact, nursing notified. Patient would benefit from continued skilled Physical Therapy to improve functional independence and quality of life. Patient's/ family goals   rehab      Patient and or family understand(s) diagnosis, prognosis, and plan of care. Frequency of treatments: Patient will be seen  twice daily  for therapeutic exercise, functional retraining, endurance activities, balance exercises, family and patient education.      Time in 1623  Time out 1653    Total Treatment Time  25 minutes    CPT codes:  Therapeutic exercises (09806)   25 minutes  2 unit(s)  Brigido Ramos, PT

## 2022-07-30 NOTE — PROGRESS NOTES
CRITICAL CARE PROGRESS NOTE    The patient's case was discussed in multidisciplinary rounds including critical care specialist, nursing, RT and pharmacy. Her evaluation is as follows:     80year old person with PMH as described below admitted to ICU for management of     --Severely hypoxemic on BPAP 12/7 w 100% FiO2, sputum culture is positive for Pseudomonas, tachypneic  --Receiving sedation with precedex gtt  --Developed atrial fibrillation with RVR overnight, started on diltiazem infusion.        A/P:  1) Acute hypoxemic respiratory failure secondary to severe COVID-19 and Pseudomonas pneumonia and ARDS  --Oxygen supplementation to maintain sats > 89%, can use Vapotherm or BPAP   --Prone position recommended  --Antimicrobial regimen: Piperacillin-tazobactam and anidulafungin  --Antiviral regimen: Dexamethasone 6 mg X 10 days  --Cultures reviewed- Pseudomnoas in sputum culture  --Anticoagulation: Enoxaparin    2) Hip fracture  --S/p surgical management    3) Hyperglycemia  --Management with insulin administration     4) Acute kidney injury secondary to Sepsis   --Avoid nephrotoxins and dose medications according GFR    5) Delirium  --Management with haloperidol and dexmedethomidine    6) Atrial fibrillation with RVR  --Goal to stop diltiazem infusion and start enteral metoprolol    Goals of care: Limited Code  Receiving tube feedings through PEG    BP (!) 117/50   Pulse 90   Temp 97.6 °F (36.4 °C) (Axillary)   Resp (!) 46   Ht 5' 2\" (1.575 m)   Wt 115 lb (52.2 kg)   SpO2 99%   BMI 21.03 kg/m²   General:  Somnolent but easily arousable, oriented to person, tachypneic and with use of accessory muscles of respiration   HEENT: No head lesions, PERRL, EOMI, mouth without lesions, no nasal lesions, no cervical adenopathy palpated  Respiratory: Lungs with diminished breath sounds bilaterally, no adventitious sounds auscultated, with accessory muscle use, tachypneic  CV: Regular rate, no murmurs, no JVD, no leg edema  Abdomen: Soft, non tender, + bowel sounds, PEG present  Skin: Hydrated, adequate turgor, no rash, capillary refill <2 seconds  Extremities: Muscular strength 3/5in 4 limbs, moves 4 limbs spontaneously, distal pulses present  Neurology: Awake and alert, follows commands, moves 4 limbs on command and spontaneously, neck is supple, no meningitic signs present. Last 3 CMP:    Recent Labs     07/28/22  0506 07/29/22  0434 07/30/22  0427    140 142   K 4.5 4.2 4.4    103 106   CO2 26 27 26   BUN 70* 57* 49*   CREATININE 0.9 0.7 0.7   GLUCOSE 177* 231* 232*   CALCIUM 8.4* 8.5* 8.6   PROT 5.4* 5.3* 5.2*   LABALBU 2.7* 2.8* 2.9*   BILITOT 0.5 0.6 0.5   ALKPHOS 108* 121* 137*   AST 41* 37* 53*   ALT 31 32 49*       Recent Labs     07/30/22  0427   WBC 14.8*   RBC 2.90*   HGB 8.7*   HCT 28.7*   MCV 99.0   MCH 30.0   MCHC 30.3*   RDW 18.5*      MPV 10.5       No results for input(s): BC in the last 72 hours. No results for input(s): Runell Clutter in the last 72 hours.     24 HR INTAKE/OUTPUT:    Intake/Output Summary (Last 24 hours) at 7/30/2022 1100  Last data filed at 7/29/2022 1600  Gross per 24 hour   Intake 2088.69 ml   Output 400 ml   Net 1688.69 ml       MEDICATIONS:   cloNIDine  1 patch TransDERmal Weekly    enoxaparin  40 mg SubCUTAneous Daily    insulin lispro  0-4 Units SubCUTAneous Q6H    insulin lispro  0-4 Units SubCUTAneous Nightly    piperacillin-tazobactam  3,375 mg IntraVENous Q8H    acetylcysteine  4 mL Inhalation TID    sodium chloride flush  5-40 mL IntraVENous 2 times per day    heparin flush  3 mL IntraVENous 2 times per day    anidulafungin  100 mg IntraVENous Q24H    ipratropium-albuterol  1 ampule Inhalation Q4H    dexamethasone  6 mg IntraVENous Q24H    polyethylene glycol  17 g Per G Tube BID    pantoprazole  40 mg IntraVENous Daily    aspirin  81 mg Oral BID WC      dextrose      dexmedetomidine HCl in NaCl 0.6 mcg/kg/hr (07/30/22 0817)    dilTIAZem (CARDIZEM) 125 mg in dextrose 5% 125 mL infusion 5 mg/hr (07/30/22 1035)    sodium chloride      sodium chloride 50 mL/hr at 07/30/22 1011    sodium chloride Stopped (07/25/22 0746)     HYDROmorphone, glucose, dextrose bolus **OR** dextrose bolus, glucagon (rDNA), dextrose, haloperidol lactate, sodium chloride flush, sodium chloride, heparin flush, bisacodyl, sodium chloride flush, sodium chloride    OBJECTIVE:  Vitals:    07/30/22 0900   BP: (!) 117/50   Pulse: 90   Resp: (!) 46   Temp:    SpO2: 99%     FiO2 : 85 %  O2 Flow Rate (L/min): 60 L/min  O2 Device: PAP (positive airway pressure)        LABS:  WBC   Date Value Ref Range Status   07/30/2022 14.8 (H) 4.5 - 11.5 E9/L Final   07/29/2022 11.8 (H) 4.5 - 11.5 E9/L Final   07/28/2022 13.3 (H) 4.5 - 11.5 E9/L Final     Hemoglobin   Date Value Ref Range Status   07/30/2022 8.7 (L) 11.5 - 15.5 g/dL Final   07/29/2022 8.6 (L) 11.5 - 15.5 g/dL Final   07/28/2022 8.1 (L) 11.5 - 15.5 g/dL Final     Hematocrit   Date Value Ref Range Status   07/30/2022 28.7 (L) 34.0 - 48.0 % Final   07/29/2022 28.7 (L) 34.0 - 48.0 % Final   07/28/2022 27.2 (L) 34.0 - 48.0 % Final     MCV   Date Value Ref Range Status   07/30/2022 99.0 80.0 - 99.9 fL Final   07/29/2022 99.0 80.0 - 99.9 fL Final   07/28/2022 100.0 (H) 80.0 - 99.9 fL Final     Platelets   Date Value Ref Range Status   07/30/2022 441 130 - 450 E9/L Final   07/29/2022 427 130 - 450 E9/L Final   07/28/2022 436 130 - 450 E9/L Final     Sodium   Date Value Ref Range Status   07/30/2022 142 132 - 146 mmol/L Final   07/29/2022 140 132 - 146 mmol/L Final   07/28/2022 142 132 - 146 mmol/L Final     Potassium   Date Value Ref Range Status   07/30/2022 4.4 3.5 - 5.0 mmol/L Final   07/29/2022 4.2 3.5 - 5.0 mmol/L Final   07/28/2022 4.5 3.5 - 5.0 mmol/L Final     Chloride   Date Value Ref Range Status   07/30/2022 106 98 - 107 mmol/L Final   07/29/2022 103 98 - 107 mmol/L Final   07/28/2022 106 98 - 107 mmol/L Final     CO2   Date Value Ref Range Status 07/30/2022 26 22 - 29 mmol/L Final   07/29/2022 27 22 - 29 mmol/L Final   07/28/2022 26 22 - 29 mmol/L Final     BUN   Date Value Ref Range Status   07/30/2022 49 (H) 6 - 23 mg/dL Final   07/29/2022 57 (H) 6 - 23 mg/dL Final   07/28/2022 70 (H) 6 - 23 mg/dL Final     Creatinine   Date Value Ref Range Status   07/30/2022 0.7 0.5 - 1.0 mg/dL Final   07/29/2022 0.7 0.5 - 1.0 mg/dL Final   07/28/2022 0.9 0.5 - 1.0 mg/dL Final     Glucose   Date Value Ref Range Status   07/30/2022 232 (H) 74 - 99 mg/dL Final   07/29/2022 231 (H) 74 - 99 mg/dL Final   07/28/2022 177 (H) 74 - 99 mg/dL Final     Calcium   Date Value Ref Range Status   07/30/2022 8.6 8.6 - 10.2 mg/dL Final   07/29/2022 8.5 (L) 8.6 - 10.2 mg/dL Final   07/28/2022 8.4 (L) 8.6 - 10.2 mg/dL Final     Total Protein   Date Value Ref Range Status   07/30/2022 5.2 (L) 6.4 - 8.3 g/dL Final   07/29/2022 5.3 (L) 6.4 - 8.3 g/dL Final   07/28/2022 5.4 (L) 6.4 - 8.3 g/dL Final     Albumin   Date Value Ref Range Status   07/30/2022 2.9 (L) 3.5 - 5.2 g/dL Final   07/29/2022 2.8 (L) 3.5 - 5.2 g/dL Final   07/28/2022 2.7 (L) 3.5 - 5.2 g/dL Final     Total Bilirubin   Date Value Ref Range Status   07/30/2022 0.5 0.0 - 1.2 mg/dL Final   07/29/2022 0.6 0.0 - 1.2 mg/dL Final   07/28/2022 0.5 0.0 - 1.2 mg/dL Final     Alkaline Phosphatase   Date Value Ref Range Status   07/30/2022 137 (H) 35 - 104 U/L Final   07/29/2022 121 (H) 35 - 104 U/L Final   07/28/2022 108 (H) 35 - 104 U/L Final     AST   Date Value Ref Range Status   07/30/2022 53 (H) 0 - 31 U/L Final   07/29/2022 37 (H) 0 - 31 U/L Final   07/28/2022 41 (H) 0 - 31 U/L Final     ALT   Date Value Ref Range Status   07/30/2022 49 (H) 0 - 32 U/L Final   07/29/2022 32 0 - 32 U/L Final   07/28/2022 31 0 - 32 U/L Final     GFR Non-   Date Value Ref Range Status   07/30/2022 >60 >=60 mL/min/1.73 Final     Comment:     Chronic Kidney Disease: less than 60 ml/min/1.73 sq.m.           Kidney Failure: less than 15 ml/min/1.73 sq.m. Results valid for patients 18 years and older. 07/29/2022 >60 >=60 mL/min/1.73 Final     Comment:     Chronic Kidney Disease: less than 60 ml/min/1.73 sq.m. Kidney Failure: less than 15 ml/min/1.73 sq.m. Results valid for patients 18 years and older. 07/28/2022 58 >=60 mL/min/1.73 Final     Comment:     Chronic Kidney Disease: less than 60 ml/min/1.73 sq.m. Kidney Failure: less than 15 ml/min/1.73 sq.m. Results valid for patients 18 years and older. GFR    Date Value Ref Range Status   07/30/2022 >60  Final   07/29/2022 >60  Final   07/28/2022 >60  Final     Magnesium   Date Value Ref Range Status   07/30/2022 2.2 1.6 - 2.6 mg/dL Final   07/29/2022 2.1 1.6 - 2.6 mg/dL Final   07/28/2022 2.4 1.6 - 2.6 mg/dL Final     Phosphorus   Date Value Ref Range Status   07/30/2022 2.9 2.5 - 4.5 mg/dL Final   07/29/2022 2.9 2.5 - 4.5 mg/dL Final   07/28/2022 3.7 2.5 - 4.5 mg/dL Final     Recent Labs     07/29/22  0540   PH 7.440   PO2 56.5*   PCO2 36.0   HCO3 23.9   BE 0.0   O2SAT 88.4*         RADIOLOGY:  XR CHEST PORTABLE   Final Result   1. Worsening of the multifocal bilateral airspace disease when compared to   patient's prior studies. XR CHEST PORTABLE   Final Result   Extensive bilateral airspace disease with mild interval progression at the   right upper lobe. Pleural effusions. XR CHEST PORTABLE   Final Result   Persistent bilateral pulmonary opacifications with bilateral pleural   effusions. XR CHEST PORTABLE   Final Result   Interval placement of left arm PICC terminating within the mid SVC/azygous   region without postprocedure pneumothorax. Persistent bilateral pulmonary   opacifications along with small to moderate right pleural effusion and   persistent cardiomegaly. XR CHEST PORTABLE   Final Result   No significant interval change of the past 15 hours.          XR CHEST PORTABLE   Final Result   Stable bilateral pulmonary opacities. Possible right pleural effusion. XR CHEST PORTABLE   Final Result   Increasing airspace disease and volume loss in left hemithorax suspicious at   least partial left lower lobe collapse with mucous plugging. Pulmonary consultation is recommended. XR CHEST PORTABLE   Final Result   Increasing bilateral airspace disease with bilateral pleural effusions and   moderate cardiomegaly. XR HIP RIGHT (2-3 VIEWS)   Final Result   It intact right femoral gamma nail with near anatomic alignment of femoral   fracture fragments and severe right hip osteoarthritis. CT HEAD WO CONTRAST   Final Result   No acute intracranial abnormality. Chronic microvascular ischemic changes. Small old lacunar infarcts. FL MODIFIED BARIUM SWALLOW W VIDEO   Final Result   Swallowing dysfunction as described above including aspiration of thin liquid   barium and barium nectar. Please see separate speech pathology report for full discussion of findings   and recommendations. Fluoro For Surgical Procedures   Final Result   Intraprocedural fluoroscopic spot images as above. See separate procedure   report for more information. XR CHEST PORTABLE   Final Result   Bilateral pulmonary opacities, left greater than right, likely   infectious/inflammatory. Aspiration pneumonitis would also be in the   differential diagnosis. XR HIP RIGHT (1 VIEW)   Final Result      Intertrochanteric fracture of the proximal right femur. XR FEMUR RIGHT (MIN 2 VIEWS)   Final Result   Intertrochanteric fracture of the proximal right femur         XR HIP RIGHT (2-3 VIEWS)   Final Result   Displaced right intertrochanteric fracture with varus deformity. .         XR CHEST 1 VIEW   Final Result   No acute process. CT HEAD WO CONTRAST   Final Result   No acute intracranial abnormality.          CT CERVICAL SPINE WO CONTRAST   Final Result   No acute abnormality of the cervical spine. Multilevel degenerative changes. PROBLEM LIST:  Principal Problem:    Hip fracture requiring operative repair, left, open type I or II, initial encounter ([facility] [address]  Active Problems:    Severe protein-calorie malnutrition ([facility] [address]    Acute encephalopathy    Acute hypoxemic respiratory failure due to COVID-19 [facility])    Aspiration pneumonia ([facility] [address]  Resolved Problems:    * No resolved hospital problems. *      ATTESTATION:  ICU Staff Physician note of personal involvement in Care  As the attending physician, I certify that I personally reviewed the patients history and personnally examined the patient to confirm the physical findings described above,  And that I reviewed the relevant imaging studies and available reports. I also discussed the differential diagnosis and all of the proposed management plans with the patient and individuals accompanying the patient to this visit. They had the opportunity to ask questions about the proposed management plans and to have those questions answered. This patient has a high probability of sudden, clinically significant deterioration, which requires the highest level of physician preparedness to intervene urgently. I managed/supervised life or organ supporting interventions that required frequent physician assessment. I devoted my full attention to the direct care of this patient for the amount of time indicated below. Time I spent with the family or surrogate(s) is included only if the patient was incapable of providing the necessary information or participating in medical decisions - Time devoted to teaching and to any procedures I billed separately is not included.      CRITICAL CARE TIME:  30 minutes    Zackery Cunningham MD  Pulmonary and Critical Care Medicine

## 2022-07-30 NOTE — PROGRESS NOTES
PROGRESS NOTE  By EMMETT Garcia    [facility]  Dr. Veronica Campbell M.D.,  Dr. Jose Liriano M.D.,   Dr. Zain Hunter D.O.,  Dr. Pascale Srivastava M.D.,  Dr. Derrek Leyva D.O.,  Angeles Urena D.O. Dawn Ramos  80 y.o.  female    Subjective:     No in person exam today in order to prevent exposure and preserve PPE. Discussed patient with nursing. Patient resting in bed. New onset atrial fibrillation with RVR overnight. Tachypneic. Requiring significant oxygen supplementation. Minimally responsive. OBJECTIVE:    /75   Pulse (!) 112   Temp 97.1 °F (36.2 °C) (Axillary)   Resp (!) 46   Ht 5' 2\" (1.575 m)   Wt 110 lb (49.9 kg)   SpO2 90%   BMI 20.12 kg/m²       DATA:    Monitor data reviewed -sinus rhythm noted.     Stool (measured) : 0 mL  Lab Results   Component Value Date/Time    WBC 14.8 07/30/2022 04:27 AM    RBC 2.90 07/30/2022 04:27 AM    HGB 8.7 07/30/2022 04:27 AM    HCT 28.7 07/30/2022 04:27 AM    MCV 99.0 07/30/2022 04:27 AM    MCH 30.0 07/30/2022 04:27 AM    MCHC 30.3 07/30/2022 04:27 AM    RDW 18.5 07/30/2022 04:27 AM     07/30/2022 04:27 AM    MPV 10.5 07/30/2022 04:27 AM     Lab Results   Component Value Date/Time     07/30/2022 04:27 AM    K 4.4 07/30/2022 04:27 AM     07/30/2022 04:27 AM    CO2 26 07/30/2022 04:27 AM    BUN 49 07/30/2022 04:27 AM    CREATININE 0.7 07/30/2022 04:27 AM    CALCIUM 8.6 07/30/2022 04:27 AM    PROT 5.2 07/30/2022 04:27 AM    LABALBU 2.9 07/30/2022 04:27 AM    BILITOT 0.5 07/30/2022 04:27 AM    ALKPHOS 137 07/30/2022 04:27 AM    AST 53 07/30/2022 04:27 AM    ALT 49 07/30/2022 04:27 AM     No results found for: LIPASE  No results found for: AMYLASE      ASSESSMENT/PLAN:  Patient Active Problem List   Diagnosis    Degenerative arthropathy of spinal facet joint, Multilevel of the cervical spine    DDD (degenerative disc disease), cervicalC6-C7    Spondylosis, lumbosacral    Lumbar facet arthropathy Lumbar spinal stenosis    Osteoarthrosis, hip    Knee pain    Facet syndrome right C2-C6     Degenerative osteoarthrosis right C2-C6    Protruded cervical disc    Cervical radiculopathy    Neural foraminal stenosis of lumbar spine    Neural foraminal stenosis of cervical spine    Osteoarthritis of lumbar spine, degenerative with facet syndrome    Hip fracture requiring operative repair, left, open type I or II, initial encounter ([facility] [address]    Severe protein-calorie malnutrition ([facility] [address]    Acute encephalopathy    Acute hypoxemic respiratory failure due to COVID-19 [facility])    Aspiration pneumonia ([facility] [address]         Assessment / plan:    1. Dysphagia/Aspiration   -S/P PEG 7/18  -Tube feedings per admitting service     2. Anemia   -New anemia first noted in July of this year  -Iron deficient/normocytic  -H&H appears stabilized   -No evidence of overt bleed  -Pending FOBT   -See discussion below regarding endoscopies     3. Constipation  -Family reports bowel movement  -Laxatives/stool softeners via PEG tube    4. Respiratory failure/COVID-19 infection  -Per admitting/pulmonology     Patient remains in significant respiratory failure requiring noninvasive positive pressure ventilation. At this time patient will be at extremely high risk for further respiratory decompensation and likely will require to be intubated and mechanically ventilated for procedure. Patient is very likely to have prolonged ventilator course. Defer goals of care to admitting. No immediate plans for intervention from GI POV at this time. Patricia Tsang, THOMAS - CNP  7/30/2022  6:49 AM    NOTE:  This report was transcribed using voice recognition software. Every effort was made to ensure accuracy; however, inadvertent computerized transcription errors may be present.

## 2022-07-30 NOTE — PROGRESS NOTES
[facility]  [facility]  Progress Note    NAME: Gypsy Garza  MR:  [id number]  :   10/17/1928  DATE OF SERVICE:22    This is a face to face encounter with Gypsy Garza 80 y.o. female on 22  Elements of this note, including Diagnosis,  Interval History, Past Medical/Surgical/Family/Social Histories, ROS, physical exam, and Assessment and Plan were copied and pasted from Previous. Updates have been made where noted and reflect current exam and medical decision making from the DOS of this encounter. CHIEF COMPLAINT     ID following for   Chief Complaint   Patient presents with    Fall     HISTORY OF PRESENT ILLNESS     Pt seen and examined  22  In icu  daughter present had no new concerns  bipap  S/p bronch  cx oxidase+ Pseudomonas          Patient is tolerating medications. No reported adverse drug reactions. REVIEW OF SYSTEMS     As stated above in the chief complaint, otherwise negative.   CURRENT MEDICATIONS      metoprolol tartrate  25 mg Per NG tube BID    enoxaparin  40 mg SubCUTAneous Daily    insulin lispro  0-4 Units SubCUTAneous Q6H    insulin lispro  0-4 Units SubCUTAneous Nightly    piperacillin-tazobactam  3,375 mg IntraVENous Q8H    acetylcysteine  4 mL Inhalation TID    sodium chloride flush  5-40 mL IntraVENous 2 times per day    heparin flush  3 mL IntraVENous 2 times per day    anidulafungin  100 mg IntraVENous Q24H    ipratropium-albuterol  1 ampule Inhalation Q4H    dexamethasone  6 mg IntraVENous Q24H    polyethylene glycol  17 g Per G Tube BID    pantoprazole  40 mg IntraVENous Daily    aspirin  81 mg Oral BID WC     Continuous Infusions:   dextrose      dexmedetomidine HCl in NaCl 0.6 mcg/kg/hr (22 1245)    dilTIAZem (CARDIZEM) 125 mg in dextrose 5% 125 mL infusion Stopped (22 1118)    sodium chloride      sodium chloride 50 mL/hr at 22 1011    sodium chloride Stopped (22 0746)     PRN hip, initial encounter [facility]) [S72.001A]  Hip fracture requiring operative repair, left, open type I or II, initial encounter ([facility] [address] [S72.002B]  Fall on same level from slipping, tripping or stumbling, initial encounter [W01. 0XXA]  Traumatic rhabdomyolysis, initial encounter ([facility] [address] [place] Sameer. 6XXA]  On treatment for   Leukocytosis  better   Covid +  7/24 S/P toci   HCAP vs aspiration LLL oxidase+  Cxry Worsening of the multifocal bilateral airspace disease when compared to   patient's prior studies. 7/25/ s/p bronch  7/15 mucous plug  RIGHT INTROCHANTERIC CEPHALOMEDULLARY NAIL  INSERTION  Rigth thigh hematoma   UTI s/p ceftriaxone        piperacillin-tazobactam (ZOSYN) 3,375 mg in dextrose 5 % 50 mL IVPB extended infusion (mini-bag), Q8H  anidulafungin (ERAXIS) 100 mg in dextrose 5 % 130 mL IVPB, Q24H  dexamethasone (DECADRON) injection 6 mg, Q24H       No change    Imaging and labs were reviewed per medical records.         Electronically signed by Jackie Bonner MD on 7/30/2022 at 3:21 PM

## 2022-07-31 NOTE — FLOWSHEET NOTE
Called Dr. Araceli Muñoz notified of following findings patient absence of a pulse,blood pressure,respirations, and presence of dilated pupils unresponsive to light verified with second nurse Hanna pronounced over the phone by Dr. Araceli Muñoz.

## 2022-08-03 LAB
BLOOD CULTURE, ROUTINE: NORMAL
CULTURE, BLOOD 2: NORMAL

## 2022-09-05 NOTE — DISCHARGE SUMMARY
Physician Discharge Summary     Patient ID:  Leia Simon  [id number]  30 y.o.  10/17/1928    Admit date: 2022    Discharge date and time: 2022  8:20 PM     Admitting Physician: Vidal Ingram MD     Discharge Physician: Vidal Ingram    Admission Diagnoses: Closed fracture of right hip, initial encounter [facility]) [S72.001A]  Hip fracture requiring operative repair, left, open type I or II, initial encounter [facility] [S72.002B]  Fall on same level from slipping, tripping or stumbling, initial encounter [W01. 0XXA]  Traumatic rhabdomyolysis, initial encounter ([facility] [address] Susanna Franklin. 6XXA]    Discharge Diagnoses:   Hip Fracture requiring operative repair  Severe Protein malnutrition  Acute resp failure with hypoxia due to Covid 19  Aspiratiron PNA  Atrial fib with RVR    Admission Condition: poor    Discharged Condition:     Indication for Admission:     Hospital Course:   Mrs. Shari Luna was admitted with unwitnessed fall. She had hip fracture. She was taken to OR for repair. During hospitalization she was noted to be declining. Poor oral intake requiring placement of tube feeding. Further testing revealed covid and patient was noted to have aspiration PNA. Her symptoms continued to worsen and was transferred to ICU. Patient unfortunately  in the ICU. Time spent in discharge is less than 30 minutes    Consults: pulmonary/intensive care and ID    Significant Diagnostic Studies: labs:     Treatments: IV hydration    Discharge Exam:      Disposition:     In process/preliminary results:  Outstanding Order Results       Date and Time Order Name Status Description    7/15/2022  9:25 AM Microscopic Urinalysis Preliminary             Patient Instructions:   Discharge Medication List as of 2022 11:17 PM        START taking these medications    Details   metoprolol tartrate (LOPRESSOR) 50 MG tablet Take 1 tablet by mouth in the morning and 1 tablet before bedtime. , Disp-60 tablet, R-3Normal      aspirin EC 81 MG EC tablet Take 1 tablet by mouth in the morning and 1 tablet in the evening. Take with meals. , Disp-60 tablet, R-0Print           STOP taking these medications       oxyCODONE-acetaminophen (PERCOCET) 5-325 MG per tablet Comments:   Reason for Stopping:         Naproxen Sodium (ALEVE PO) Comments:   Reason for Stopping:         cephALEXin (KEFLEX) 500 MG capsule Comments:   Reason for Stopping:         ondansetron (ZOFRAN ODT) 4 MG disintegrating tablet Comments:   Reason for Stopping:         metoprolol succinate (TOPROL XL) 25 MG extended release tablet Comments:   Reason for Stopping:         gabapentin (NEURONTIN) 100 MG capsule Comments:   Reason for Stopping:         amlodipine (NORVASC) 2.5 MG tablet Comments:   Reason for Stopping:         acetaminophen (TYLENOL) 500 MG tablet Comments:   Reason for Stopping:             Activity:   Diet:   Wound Care:      Follow-up with     Signed:  Thanh Chow MD  9/5/2022  7:50 AM

## 2022-12-20 NOTE — PROGRESS NOTES
Department of Internal Medicine  General Internal Medicine  Attending Progress Note      SUBJECTIVE:    Patient seen and examined this morning  Awake , somewhat confused no distress  No complaints. PEG tube placed. Medications    Current Facility-Administered Medications: metoprolol tartrate (LOPRESSOR) tablet 50 mg, 50 mg, Oral, BID  sodium chloride flush 0.9 % injection 5-40 mL, 5-40 mL, IntraVENous, 2 times per day  sodium chloride flush 0.9 % injection 5-40 mL, 5-40 mL, IntraVENous, PRN  0.9 % sodium chloride infusion, 25 mL, IntraVENous, PRN  pantoprazole (PROTONIX) injection 40 mg, 40 mg, IntraVENous, Daily  dextrose 5 % solution, , IntraVENous, Continuous  cefTRIAXone (ROCEPHIN) 1,000 mg in sterile water 10 mL IV syringe, 1,000 mg, IntraVENous, Q24H  aspirin chewable tablet 81 mg, 81 mg, Oral, BID WC  morphine (PF) injection 2 mg, 2 mg, IntraVENous, Q2H PRN **OR** morphine injection 4 mg, 4 mg, IntraVENous, Q2H PRN  oxyCODONE (ROXICODONE) immediate release tablet 5 mg, 5 mg, Oral, Q4H PRN **OR** oxyCODONE (ROXICODONE) immediate release tablet 10 mg, 10 mg, Oral, Q4H PRN    Physical    VITALS:  BP (!) 134/56   Pulse 89   Temp 98.1 °F (36.7 °C) (Axillary)   Resp 16   Ht 5' 2\" (1.575 m)   Wt 91 lb 3.2 oz (41.4 kg)   SpO2 98%   BMI 16.68 kg/m²   TEMPERATURE:  Current - Temp: 98.1 °F (36.7 °C); Max - Temp  Av.3 °F (36.8 °C)  Min: 98.1 °F (36.7 °C)  Max: 98.6 °F (37 °C)  RESPIRATIONS RANGE: Resp  Av  Min: 16  Max: 16  PULSE RANGE: Pulse  Av  Min: 78  Max: 114  BLOOD PRESSURE RANGE:  Systolic (31AVI), IPO:949 , Min:134 , RKU:280   ; Diastolic (60XLS), URT:88, Min:56, Max:85    PULSE OXIMETRY RANGE: SpO2  Av %  Min: 92 %  Max: 98 %  24HR INTAKE/OUTPUT:    Intake/Output Summary (Last 24 hours) at 2022 1900  Last data filed at 2022 1358  Gross per 24 hour   Intake 600 ml   Output --   Net 600 ml         CONSTITUTIONAL: Awake, no distress, appears as stated age. Frail.   HEENT: Normocephalic atraumatic. No icterus, mild pallor  NECK: Supple, no JVD , no lymphadenopathy, no bruits, no thyromegaly  LUNGS: Clear to auscultation bilaterally. Diminished over bases  CARDIOVASCULAR: Regularly irregular, no murmur ,rub or gallop. ABDOMEN: Soft, nontender, bowel sounds are positive, no organomegaly appreciated. NEUROLOGIC: Awake, alert, oriented only partially in time, no focal deficits noted. EXT: No edema, clubbing, or cyanosis. CBC with Differential:    Lab Results   Component Value Date/Time    WBC 9.8 07/19/2022 07:16 AM    RBC 2.97 07/19/2022 07:16 AM    HGB 8.7 07/19/2022 07:16 AM    HCT 29.0 07/19/2022 07:16 AM     07/19/2022 07:16 AM    MCV 97.6 07/19/2022 07:16 AM    MCH 29.3 07/19/2022 07:16 AM    MCHC 30.0 07/19/2022 07:16 AM    RDW 14.8 07/19/2022 07:16 AM    SEGSPCT 73 02/04/2013 02:45 PM    LYMPHOPCT 10.4 07/19/2022 07:16 AM    MONOPCT 13.7 07/19/2022 07:16 AM    BASOPCT 0.1 07/19/2022 07:16 AM    MONOSABS 1.35 07/19/2022 07:16 AM    LYMPHSABS 1.02 07/19/2022 07:16 AM    EOSABS 0.07 07/19/2022 07:16 AM    BASOSABS 0.01 07/19/2022 07:16 AM     CMP:    Lab Results   Component Value Date/Time     07/19/2022 07:16 AM    K 3.4 07/19/2022 07:16 AM     07/19/2022 07:16 AM    CO2 29 07/19/2022 07:16 AM    BUN 30 07/19/2022 07:16 AM    CREATININE 0.5 07/19/2022 07:16 AM    GFRAA >60 07/19/2022 07:16 AM    LABGLOM >60 07/19/2022 07:16 AM    GLUCOSE 104 07/19/2022 07:16 AM    PROT 5.4 07/19/2022 07:16 AM    LABALBU 3.0 07/19/2022 07:16 AM    CALCIUM 8.9 07/19/2022 07:16 AM    BILITOT 1.1 07/19/2022 07:16 AM    ALKPHOS 61 07/19/2022 07:16 AM    AST 26 07/19/2022 07:16 AM    ALT 17 07/19/2022 07:16 AM       Assessment and plan    1. S/P Fall. 2.Rt intertrochanteric fracture of the neck of femur, Orthopedics following , S/P ORIF with intramedullary nail 7/15th  Pain controlled. PT/OT, awaiting rehab placement. 3.Sinus tachycardia with PAC's   Lopressor 5 mg iv q 6 hrs.  [place] Counseling: I discussed with the patient the risks of tildrakizumab including but not limited to immunosuppression, malignancy, posterior leukoencephalopathy syndrome, and serious infections.  The patient understands that monitoring is required including a PPD at baseline and must alert us or the primary physician if symptoms of infection or other concerning signs are noted.

## (undated) DEVICE — KIT BEDSIDE REVITAL OX 500ML

## (undated) DEVICE — 12 ML SYRINGE,LUER-LOCK TIP: Brand: MONOJECT

## (undated) DEVICE — DOUBLE BASIN SET: Brand: MEDLINE INDUSTRIES, INC.

## (undated) DEVICE — YANKAUER,BULB TIP,W/O VENT,RIGID,STERILE: Brand: MEDLINE

## (undated) DEVICE — Z DISCONTINUED GLOVE SURG SZ 7.5 L12IN FNGR THK13MIL WHT ISOLEX

## (undated) DEVICE — STERILE POLYISOPRENE POWDER-FREE SURGICAL GLOVES: Brand: PROTEXIS

## (undated) DEVICE — GAUZE,SPONGE,4"X4",16PLY,XRAY,STRL,LF: Brand: MEDLINE

## (undated) DEVICE — SET MAJOR INSTR ORTHO

## (undated) DEVICE — KENDALL 450 SERIES MONITORING FOAM ELECTRODE - RECTANGULAR SHAPE ( 3/PK): Brand: KENDALL

## (undated) DEVICE — FORCEPS BX L240CM JAW DIA2.8MM L CAP W/ NDL MIC MESH TOOTH

## (undated) DEVICE — LUBRICANT SURG JELLY ST BACTER TUBE 4.25OZ

## (undated) DEVICE — BINDER ABD SM M W12IN CIRC 30 45IN 4 PNL E CNTCT CLSR POSTOP

## (undated) DEVICE — BLOCK BITE 60FR CAREGUARD

## (undated) DEVICE — SOLUTION IV IRRIG POUR BRL 0.9% SODIUM CHL 2F7124

## (undated) DEVICE — Z DISCONTINUED APPLICATOR SURG PREP 0.35OZ 2% CHG 70% ISO ALC W/ HI LT

## (undated) DEVICE — COTTONBALL L DIA1.25IN 100% HIGHLY ABSRB BLEACH COT WHT SFT

## (undated) DEVICE — MARKER,SKIN,WI/RULER AND LABELS: Brand: MEDLINE

## (undated) DEVICE — MEDI-VAC YANKAUER SUCTION HANDLE W/BULBOUS TIP: Brand: CARDINAL HEALTH

## (undated) DEVICE — ONE STEP CONICAL REAMER: Brand: GAMMA

## (undated) DEVICE — PENCIL ES L3M BTTN SWCH HOLSTER W/ BLDE ELECTRD EDGE

## (undated) DEVICE — DRILL, AO SMALL: Brand: GAMMA

## (undated) DEVICE — COVER,TABLE,44X90,STERILE: Brand: MEDLINE

## (undated) DEVICE — K-WIRE

## (undated) DEVICE — Z DISCONTINUED USE 2275686 GLOVE SURG SZ 8 L12IN FNGR THK13MIL WHT ISOLEX POLYISOPRENE

## (undated) DEVICE — Device

## (undated) DEVICE — MASK,FACE,MAXFLUIDPROTECT,SHIELD/ERLPS: Brand: MEDLINE

## (undated) DEVICE — NEEDLE HYPO 25GA L1.5IN BLU POLYPR HUB S STL REG BVL STR

## (undated) DEVICE — ELECTRODE PT RET AD L9FT HI MOIST COND ADH HYDRGEL CORDED

## (undated) DEVICE — TUBING, SUCTION, 1/4" X 10', STRAIGHT: Brand: MEDLINE

## (undated) DEVICE — FORCEPS BX L240CM WRK CHN 2.8MM STD CAP W/ NDL MIC MESH

## (undated) DEVICE — NDL CNTR 40CT FM MAG: Brand: MEDLINE INDUSTRIES, INC.

## (undated) DEVICE — MEDI-VAC YANKAUER SUCTION HANDLE: Brand: CARDINAL HEALTH

## (undated) DEVICE — SPONGE,LAP,12"X12",XR,ST,5/PK,40PK/CS: Brand: MEDLINE

## (undated) DEVICE — Z INACTIVE USE 2735373 APPLICATOR FBR LAIN COT WOOD TIP ECONOMICAL

## (undated) DEVICE — 6 X 9  1.75MIL 4-WALL LABGUARD: Brand: MINIGRIP COMMERCIAL LLC

## (undated) DEVICE — INTENDED FOR TISSUE SEPARATION, AND OTHER PROCEDURES THAT REQUIRE A SHARP SURGICAL BLADE TO PUNCTURE OR CUT.: Brand: BARD-PARKER ® STAINLESS STEEL BLADES

## (undated) DEVICE — BINDER ABD M/L H12IN FOR 46-62IN WHT 4 SLD PNL DSGN HOOP

## (undated) DEVICE — DRAPE 64X41IN RADIOLOGY C ARM EQUIP STER

## (undated) DEVICE — GOWN,SIRUS,POLYRNF,BRTHSLV,XLN/XL,20/CS: Brand: MEDLINE

## (undated) DEVICE — Device: Brand: DEFENDO VALVE AND CONNECTOR KIT

## (undated) DEVICE — SPONGE GZ 4IN 4IN 4 PLY N WVN AVANT

## (undated) DEVICE — GOWN,SIRUS,FABRNF,XL,20/CS: Brand: MEDLINE

## (undated) DEVICE — SINGLE USE SUCTION VALVE MAJ-209: Brand: SINGLE USE SUCTION VALVE (STERILE)

## (undated) DEVICE — APPLICATOR MEDICATED 26 CC SOLUTION HI LT ORNG CHLORAPREP

## (undated) DEVICE — GAUZE,SPONGE,4"X4",16PLY,STRL,LF,10/TRAY: Brand: MEDLINE

## (undated) DEVICE — BANDAGE ADH W1XL3IN NAT FAB WVN FLX DURABLE N ADH PD SEAL

## (undated) DEVICE — 6617 IOBAN II PATIENT ISOLATION DRAPE 5/BX,4BX/CS: Brand: STERI-DRAPE™ IOBAN™ 2

## (undated) DEVICE — NEEDLE SPNL 22GA L5IN BLK HUB S STL W/ QNCKE PNT W/OUT

## (undated) DEVICE — GOWN ISOLATN REG YEL M WT MULTIPLY SIDETIE LEV 2

## (undated) DEVICE — FORCEPS ENDO L28CM JAW W4XL23MM DIA6MM STD GRSP JACK ALGTR

## (undated) DEVICE — TRAY ENDO ROOM BRONCH

## (undated) DEVICE — LAG SCREW STEP DRILL: Brand: GAMMA

## (undated) DEVICE — DRESSING GZ W1XL8IN COT XRFRM N ADH OVERWRAP CURAD

## (undated) DEVICE — SYRINGE 20ML LL S/C 50

## (undated) DEVICE — SYRINGE IRRIG 60ML SFT PLIABLE BLB EZ TO GRP 1 HND USE W/

## (undated) DEVICE — PAD,EYE,1-5/8X2 5/8,STERILE,LF,1/PK: Brand: MEDLINE

## (undated) DEVICE — 20 ML SYRINGE REGULAR TIP: Brand: MONOJECT

## (undated) DEVICE — CONTAINER SPEC COLL 960ML POLYPR TRIANG GRAD INTAKE/OUTPUT

## (undated) DEVICE — SINGLE USE BIOPSY VALVE MAJ-210: Brand: SINGLE USE BIOPSY VALVE (STERILE)

## (undated) DEVICE — TOWEL,OR,DSP,ST,BLUE,STD,6/PK,12PK/CS: Brand: MEDLINE

## (undated) DEVICE — COVER,LIGHT HANDLE,FLX,1/PK: Brand: MEDLINE INDUSTRIES, INC.

## (undated) DEVICE — BANDAGE COMPR L W4INXL11YD 100% COT WVN E DBL LEN CLP CLSR

## (undated) DEVICE — TRAP SPEC COLL 40CC MUCOUS CALIB UNIV CONN FOR OPN SUCT

## (undated) DEVICE — PADDING,UNDERCAST,COTTON, 4"X4YD STERILE: Brand: MEDLINE

## (undated) DEVICE — MEDI-VAC NON-CONDUCTIVE SUCTION TUBING: Brand: CARDINAL HEALTH

## (undated) DEVICE — PACK,BASIC I: Brand: MEDLINE